# Patient Record
Sex: MALE | Race: WHITE | NOT HISPANIC OR LATINO | Employment: OTHER | URBAN - METROPOLITAN AREA
[De-identification: names, ages, dates, MRNs, and addresses within clinical notes are randomized per-mention and may not be internally consistent; named-entity substitution may affect disease eponyms.]

---

## 2023-02-24 LAB
THYROPEROXIDASE AB (IU/ML) IN SER/PLAS: <28 IU/ML
THYROTROPIN (MIU/L) IN SER/PLAS BY DETECTION LIMIT <= 0.05 MIU/L: 0.94 MIU/L (ref 0.44–3.98)
THYROXINE (T4) (UG/DL) IN SER/PLAS: 6.1 UG/DL (ref 4.5–11.1)

## 2023-03-04 LAB
AMPHETAMINE (PRESENCE) IN URINE BY SCREEN METHOD: NORMAL
BARBITURATES PRESENCE IN URINE BY SCREEN METHOD: NORMAL
BENZODIAZEPINE (PRESENCE) IN URINE BY SCREEN METHOD: NORMAL
CANNABINOIDS IN URINE BY SCREEN METHOD: NORMAL
COCAINE (PRESENCE) IN URINE BY SCREEN METHOD: NORMAL
DRUG SCREEN COMMENT URINE: NORMAL
FENTANYL URINE: NORMAL
METHADONE (PRESENCE) IN URINE BY SCREEN METHOD: NORMAL
OPIATES (PRESENCE) IN URINE BY SCREEN METHOD: NORMAL
OXYCODONE (PRESENCE) IN URINE BY SCREEN METHOD: NORMAL
PHENCYCLIDINE (PRESENCE) IN URINE BY SCREEN METHOD: NORMAL

## 2023-03-10 DIAGNOSIS — R60.9 PERIPHERAL EDEMA: Primary | ICD-10-CM

## 2023-03-10 RX ORDER — METOPROLOL SUCCINATE 25 MG/1
0.5 TABLET, EXTENDED RELEASE ORAL DAILY
COMMUNITY
Start: 2023-01-16 | End: 2023-12-08 | Stop reason: SDUPTHER

## 2023-03-10 RX ORDER — ASPIRIN 81 MG/1
1 TABLET ORAL DAILY
COMMUNITY
Start: 2019-08-14 | End: 2023-05-03 | Stop reason: SDUPTHER

## 2023-03-10 RX ORDER — TORSEMIDE 10 MG/1
1 TABLET ORAL DAILY
COMMUNITY
Start: 2023-01-24 | End: 2023-03-10 | Stop reason: SDUPTHER

## 2023-03-10 RX ORDER — ROSUVASTATIN CALCIUM 5 MG/1
5 TABLET, COATED ORAL DAILY
COMMUNITY
End: 2023-12-08 | Stop reason: SDUPTHER

## 2023-03-10 RX ORDER — LORAZEPAM 0.5 MG/1
0.5 TABLET ORAL DAILY PRN
COMMUNITY
End: 2023-06-19 | Stop reason: SDUPTHER

## 2023-03-10 RX ORDER — ATORVASTATIN CALCIUM 20 MG/1
20 TABLET, FILM COATED ORAL DAILY
COMMUNITY
End: 2023-05-03 | Stop reason: ALTCHOICE

## 2023-03-10 RX ORDER — TORSEMIDE 10 MG/1
10 TABLET ORAL DAILY
Qty: 90 TABLET | Refills: 1 | Status: SHIPPED | OUTPATIENT
Start: 2023-03-10 | End: 2023-05-03 | Stop reason: ALTCHOICE

## 2023-03-10 RX ORDER — LISINOPRIL 10 MG/1
1 TABLET ORAL
COMMUNITY
Start: 2016-01-08 | End: 2023-12-08 | Stop reason: SDUPTHER

## 2023-03-24 PROBLEM — R61 HYPERHIDROSIS: Status: ACTIVE | Noted: 2023-03-24

## 2023-03-24 PROBLEM — M15.9 GENERALIZED OSTEOARTHRITIS OF MULTIPLE SITES: Status: ACTIVE | Noted: 2023-03-24

## 2023-03-24 PROBLEM — M25.519 SHOULDER PAIN: Status: ACTIVE | Noted: 2023-03-24

## 2023-03-24 PROBLEM — F41.8 SITUATIONAL ANXIETY: Status: ACTIVE | Noted: 2023-03-24

## 2023-03-24 PROBLEM — L72.3 SEBACEOUS CYST: Status: ACTIVE | Noted: 2023-03-24

## 2023-03-24 PROBLEM — H00.012 HORDEOLUM EXTERNUM OF RIGHT LOWER EYELID: Status: ACTIVE | Noted: 2023-03-24

## 2023-03-24 PROBLEM — S86.912A MUSCLE STRAIN OF LEFT LOWER LEG: Status: ACTIVE | Noted: 2023-03-24

## 2023-03-24 PROBLEM — R26.81 GAIT INSTABILITY: Status: ACTIVE | Noted: 2023-03-24

## 2023-03-24 PROBLEM — I35.0 MODERATE AORTIC STENOSIS: Status: ACTIVE | Noted: 2023-03-24

## 2023-03-24 PROBLEM — B00.9 HERPES SIMPLEX: Status: ACTIVE | Noted: 2023-03-24

## 2023-03-24 PROBLEM — R29.898 WEAKNESS OF BOTH LOWER EXTREMITIES: Status: ACTIVE | Noted: 2023-03-24

## 2023-03-24 PROBLEM — R00.1 BRADYCARDIA, SINUS: Status: ACTIVE | Noted: 2023-03-24

## 2023-03-24 PROBLEM — H93.19 TINNITUS: Status: ACTIVE | Noted: 2023-03-24

## 2023-03-24 PROBLEM — E78.2 HYPERLIPIDEMIA, MIXED: Status: ACTIVE | Noted: 2023-03-24

## 2023-03-24 PROBLEM — J06.9 URI, ACUTE: Status: ACTIVE | Noted: 2023-03-24

## 2023-03-24 PROBLEM — K41.90 FEMORAL HERNIA: Status: ACTIVE | Noted: 2023-03-24

## 2023-03-24 PROBLEM — L30.1 DYSHIDROSIS: Status: ACTIVE | Noted: 2023-03-24

## 2023-03-24 PROBLEM — Z95.5 PRESENCE OF STENT IN CORONARY ARTERY: Status: ACTIVE | Noted: 2023-03-24

## 2023-03-24 PROBLEM — I25.10 CORONARY ARTERY DISEASE: Status: ACTIVE | Noted: 2023-03-24

## 2023-03-24 PROBLEM — I10 ESSENTIAL HYPERTENSION: Status: ACTIVE | Noted: 2023-03-24

## 2023-03-24 PROBLEM — M26.649 TMJ ARTHRITIS: Status: ACTIVE | Noted: 2023-03-24

## 2023-03-24 RX ORDER — ASPIRIN 81 MG/1
1 TABLET ORAL DAILY
COMMUNITY

## 2023-04-12 ENCOUNTER — TELEPHONE (OUTPATIENT)
Dept: PRIMARY CARE | Facility: CLINIC | Age: 88
End: 2023-04-12
Payer: MEDICARE

## 2023-04-19 ENCOUNTER — OFFICE VISIT (OUTPATIENT)
Dept: PRIMARY CARE | Facility: CLINIC | Age: 88
End: 2023-04-19
Payer: MEDICARE

## 2023-04-19 VITALS
DIASTOLIC BLOOD PRESSURE: 68 MMHG | HEIGHT: 66 IN | WEIGHT: 126.6 LBS | SYSTOLIC BLOOD PRESSURE: 122 MMHG | BODY MASS INDEX: 20.34 KG/M2 | TEMPERATURE: 97.4 F | OXYGEN SATURATION: 98 % | HEART RATE: 68 BPM

## 2023-04-19 DIAGNOSIS — S61.412D LACERATION OF LEFT HAND WITHOUT FOREIGN BODY, SUBSEQUENT ENCOUNTER: Primary | ICD-10-CM

## 2023-04-19 PROBLEM — R00.2 PALPITATIONS: Status: ACTIVE | Noted: 2023-04-19

## 2023-04-19 PROBLEM — S01.01XS SCALP LACERATION, SEQUELA: Status: ACTIVE | Noted: 2023-04-19

## 2023-04-19 PROBLEM — I48.91 NEW ONSET ATRIAL FIBRILLATION (MULTI): Status: ACTIVE | Noted: 2023-04-19

## 2023-04-19 PROBLEM — E04.2 MULTINODULAR THYROID: Status: ACTIVE | Noted: 2023-04-19

## 2023-04-19 PROBLEM — H91.93 HEARING LOSS, BILATERAL: Status: ACTIVE | Noted: 2023-04-19

## 2023-04-19 PROBLEM — I50.42: Status: ACTIVE | Noted: 2023-04-19

## 2023-04-19 PROBLEM — I50.9 CHF (CONGESTIVE HEART FAILURE) (MULTI): Status: ACTIVE | Noted: 2023-04-19

## 2023-04-19 PROBLEM — T07.XXXA MULTIPLE CONTUSIONS: Status: ACTIVE | Noted: 2023-04-19

## 2023-04-19 PROBLEM — E01.0 THYROMEGALY: Status: ACTIVE | Noted: 2023-04-19

## 2023-04-19 PROCEDURE — 3078F DIAST BP <80 MM HG: CPT | Performed by: FAMILY MEDICINE

## 2023-04-19 PROCEDURE — 3074F SYST BP LT 130 MM HG: CPT | Performed by: FAMILY MEDICINE

## 2023-04-19 PROCEDURE — 99214 OFFICE O/P EST MOD 30 MIN: CPT | Performed by: FAMILY MEDICINE

## 2023-04-19 PROCEDURE — 1036F TOBACCO NON-USER: CPT | Performed by: FAMILY MEDICINE

## 2023-04-19 PROCEDURE — 1157F ADVNC CARE PLAN IN RCRD: CPT | Performed by: FAMILY MEDICINE

## 2023-04-19 PROCEDURE — 1159F MED LIST DOCD IN RCRD: CPT | Performed by: FAMILY MEDICINE

## 2023-04-19 PROCEDURE — 1160F RVW MEDS BY RX/DR IN RCRD: CPT | Performed by: FAMILY MEDICINE

## 2023-04-19 RX ORDER — FUROSEMIDE 20 MG/1
1 TABLET ORAL 2 TIMES DAILY
COMMUNITY
Start: 2023-03-24 | End: 2023-10-26 | Stop reason: ALTCHOICE

## 2023-04-19 RX ORDER — DOXYCYCLINE HYCLATE 100 MG
TABLET ORAL
COMMUNITY
Start: 2023-04-12 | End: 2023-05-03 | Stop reason: WASHOUT

## 2023-04-19 SDOH — ECONOMIC STABILITY: FOOD INSECURITY: WITHIN THE PAST 12 MONTHS, THE FOOD YOU BOUGHT JUST DIDN'T LAST AND YOU DIDN'T HAVE MONEY TO GET MORE.: NEVER TRUE

## 2023-04-19 SDOH — ECONOMIC STABILITY: TRANSPORTATION INSECURITY
IN THE PAST 12 MONTHS, HAS THE LACK OF TRANSPORTATION KEPT YOU FROM MEDICAL APPOINTMENTS OR FROM GETTING MEDICATIONS?: NO

## 2023-04-19 SDOH — ECONOMIC STABILITY: FOOD INSECURITY: WITHIN THE PAST 12 MONTHS, YOU WORRIED THAT YOUR FOOD WOULD RUN OUT BEFORE YOU GOT MONEY TO BUY MORE.: NEVER TRUE

## 2023-04-19 SDOH — ECONOMIC STABILITY: HOUSING INSECURITY
IN THE LAST 12 MONTHS, WAS THERE A TIME WHEN YOU DID NOT HAVE A STEADY PLACE TO SLEEP OR SLEPT IN A SHELTER (INCLUDING NOW)?: NO

## 2023-04-19 SDOH — ECONOMIC STABILITY: TRANSPORTATION INSECURITY
IN THE PAST 12 MONTHS, HAS LACK OF TRANSPORTATION KEPT YOU FROM MEETINGS, WORK, OR FROM GETTING THINGS NEEDED FOR DAILY LIVING?: NO

## 2023-04-19 SDOH — ECONOMIC STABILITY: INCOME INSECURITY: IN THE LAST 12 MONTHS, WAS THERE A TIME WHEN YOU WERE NOT ABLE TO PAY THE MORTGAGE OR RENT ON TIME?: NO

## 2023-04-19 ASSESSMENT — SOCIAL DETERMINANTS OF HEALTH (SDOH)
WITHIN THE LAST YEAR, HAVE YOU BEEN AFRAID OF YOUR PARTNER OR EX-PARTNER?: NO
WITHIN THE LAST YEAR, HAVE TO BEEN RAPED OR FORCED TO HAVE ANY KIND OF SEXUAL ACTIVITY BY YOUR PARTNER OR EX-PARTNER?: NO
WITHIN THE LAST YEAR, HAVE YOU BEEN KICKED, HIT, SLAPPED, OR OTHERWISE PHYSICALLY HURT BY YOUR PARTNER OR EX-PARTNER?: NO
HOW HARD IS IT FOR YOU TO PAY FOR THE VERY BASICS LIKE FOOD, HOUSING, MEDICAL CARE, AND HEATING?: NOT HARD AT ALL
WITHIN THE LAST YEAR, HAVE YOU BEEN HUMILIATED OR EMOTIONALLY ABUSED IN OTHER WAYS BY YOUR PARTNER OR EX-PARTNER?: NO

## 2023-04-19 ASSESSMENT — LIFESTYLE VARIABLES
HOW OFTEN DO YOU HAVE SIX OR MORE DRINKS ON ONE OCCASION: NEVER
HOW OFTEN DO YOU HAVE A DRINK CONTAINING ALCOHOL: NEVER
HOW MANY STANDARD DRINKS CONTAINING ALCOHOL DO YOU HAVE ON A TYPICAL DAY: PATIENT DOES NOT DRINK
AUDIT-C TOTAL SCORE: 0
SKIP TO QUESTIONS 9-10: 1

## 2023-04-19 ASSESSMENT — PATIENT HEALTH QUESTIONNAIRE - PHQ9
2. FEELING DOWN, DEPRESSED OR HOPELESS: NOT AT ALL
1. LITTLE INTEREST OR PLEASURE IN DOING THINGS: NOT AT ALL
SUM OF ALL RESPONSES TO PHQ9 QUESTIONS 1 & 2: 0

## 2023-04-19 ASSESSMENT — PAIN SCALES - GENERAL: PAINLEVEL: 0-NO PAIN

## 2023-04-19 NOTE — PROGRESS NOTES
"Subjective   Patient ID: Sravan Christiansen is a 89 y.o. male who presents for misc (S/r removal left hand).    HPI     Review of Systems   Cardiovascular:         He is going for cardiac catheterization tomorrow morning   Skin:         Healing laceration left hand dorsal       Objective   /68 (BP Location: Left arm)   Pulse 68   Temp 36.3 °C (97.4 °F)   Ht 1.676 m (5' 6\")   Wt 57.4 kg (126 lb 9.6 oz)   SpO2 98%   BMI 20.43 kg/m²     Physical Exam  Vitals and nursing note reviewed.   Skin:     Comments: Healing laceration left hand no evidence of any cellulitis.  Sutures are intact we are removing the sutures today.         Assessment/Plan   Problem List Items Addressed This Visit    None  Visit Diagnoses       Laceration of left hand without foreign body, subsequent encounter    -  Primary               "

## 2023-04-20 ENCOUNTER — HOSPITAL ENCOUNTER (OUTPATIENT)
Dept: DATA CONVERSION | Facility: HOSPITAL | Age: 88
End: 2023-04-20
Attending: STUDENT IN AN ORGANIZED HEALTH CARE EDUCATION/TRAINING PROGRAM | Admitting: STUDENT IN AN ORGANIZED HEALTH CARE EDUCATION/TRAINING PROGRAM
Payer: MEDICARE

## 2023-04-20 DIAGNOSIS — I10 ESSENTIAL (PRIMARY) HYPERTENSION: ICD-10-CM

## 2023-04-20 DIAGNOSIS — I48.91 UNSPECIFIED ATRIAL FIBRILLATION (MULTI): ICD-10-CM

## 2023-04-20 DIAGNOSIS — I25.10 ATHEROSCLEROTIC HEART DISEASE OF NATIVE CORONARY ARTERY WITHOUT ANGINA PECTORIS: ICD-10-CM

## 2023-04-20 DIAGNOSIS — Z95.5 PRESENCE OF CORONARY ANGIOPLASTY IMPLANT AND GRAFT: ICD-10-CM

## 2023-04-20 DIAGNOSIS — R06.02 SHORTNESS OF BREATH: ICD-10-CM

## 2023-04-20 DIAGNOSIS — I42.9 CARDIOMYOPATHY, UNSPECIFIED (MULTI): ICD-10-CM

## 2023-04-20 DIAGNOSIS — E78.5 HYPERLIPIDEMIA, UNSPECIFIED: ICD-10-CM

## 2023-04-20 DIAGNOSIS — R00.2 PALPITATIONS: ICD-10-CM

## 2023-04-20 LAB
ANION GAP IN SER/PLAS: 13 MMOL/L (ref 10–20)
CALCIUM (MG/DL) IN SER/PLAS: 9.6 MG/DL (ref 8.6–10.3)
CARBON DIOXIDE, TOTAL (MMOL/L) IN SER/PLAS: 26 MMOL/L (ref 21–32)
CHLORIDE (MMOL/L) IN SER/PLAS: 105 MMOL/L (ref 98–107)
CREATININE (MG/DL) IN SER/PLAS: 1.43 MG/DL (ref 0.5–1.3)
ERYTHROCYTE DISTRIBUTION WIDTH (RATIO) BY AUTOMATED COUNT: 14.1 % (ref 11.5–14.5)
ERYTHROCYTE MEAN CORPUSCULAR HEMOGLOBIN CONCENTRATION (G/DL) BY AUTOMATED: 32.2 G/DL (ref 32–36)
ERYTHROCYTE MEAN CORPUSCULAR VOLUME (FL) BY AUTOMATED COUNT: 97 FL (ref 80–100)
ERYTHROCYTES (10*6/UL) IN BLOOD BY AUTOMATED COUNT: 4.04 X10E12/L (ref 4.5–5.9)
FIO2: 21 %
FIO2: 21 % (ref 21–100)
FIO2: 21 % (ref 21–100)
GFR MALE: 46 ML/MIN/1.73M2
GLUCOSE (MG/DL) IN SER/PLAS: 93 MG/DL (ref 74–99)
HEMATOCRIT (%) IN BLOOD BY AUTOMATED COUNT: 39.1 % (ref 41–52)
HEMOGLOBIN (G/DL) IN BLOOD: 12.6 G/DL (ref 13.5–17.5)
INR IN PPP BY COAGULATION ASSAY: 1.1 (ref 0.9–1.1)
LEUKOCYTES (10*3/UL) IN BLOOD BY AUTOMATED COUNT: 8.9 X10E9/L (ref 4.4–11.3)
NRBC (PER 100 WBCS) BY AUTOMATED COUNT: 0 /100 WBC (ref 0–0)
PATIENT TEMPERATURE: 37 DEGREES
PATIENT TEMPERATURE: 37 DEGREES C
PATIENT TEMPERATURE: 37 DEGREES C
PLATELETS (10*3/UL) IN BLOOD AUTOMATED COUNT: 159 X10E9/L (ref 150–450)
POCT BASE EXCESS, ARTERIAL: -0.8 MMOL/L (ref -2–3)
POCT BASE EXCESS, MIXED: 2 MMOL/L
POCT BASE EXCESS, VENOUS: 0.8 MMOL/L (ref -2–3)
POCT HCO3, ARTERIAL: 24.3 MMOL/L (ref 22–26)
POCT HCO3, MIXED: 27.8 MMOL/L
POCT HCO3, VENOUS: 26.6 MMOL/L (ref 22–26)
POCT OXY HEMOGLOBIN, ARTERIAL: 94 % (ref 94–98)
POCT OXY HEMOGLOBIN, MIXED: 66.1 % (ref 45–75)
POCT OXY HEMOGLOBIN, VENOUS: 64 % (ref 45–75)
POCT PCO2, ARTERIAL: 41 MMHG (ref 38–42)
POCT PCO2, MIXED: 47 MMHG
POCT PCO2, VENOUS: 46 MMHG (ref 41–51)
POCT PH, ARTERIAL: 7.38 (ref 7.38–7.42)
POCT PH, MIXED: 7.38
POCT PH, VENOUS: 7.37 (ref 7.33–7.43)
POCT PO2, ARTERIAL: 78 MMHG (ref 85–95)
POCT PO2, MIXED: 38 MMHG
POCT PO2, VENOUS: 37 MMHG (ref 35–45)
POCT SO2, ARTERIAL: 96 % (ref 94–100)
POCT SO2, MIXED: 68 %
POCT SO2, VENOUS: 65 % (ref 45–75)
POTASSIUM (MMOL/L) IN SER/PLAS: 4.2 MMOL/L (ref 3.5–5.3)
PROTHROMBIN TIME (PT) IN PPP BY COAGULATION ASSAY: 13 SEC (ref 9.8–13.4)
SITE OF ARTERIAL PUNCTURE: ABNORMAL
SODIUM (MMOL/L) IN SER/PLAS: 140 MMOL/L (ref 136–145)
UREA NITROGEN (MG/DL) IN SER/PLAS: 29 MG/DL (ref 6–23)

## 2023-04-21 LAB
FIO2: 21 % (ref 21–100)
PATIENT TEMPERATURE: 37 DEGREES C
POCT BASE EXCESS, VENOUS: 0.1 MMOL/L (ref -2–3)
POCT HCO3, VENOUS: 26 MMOL/L (ref 22–26)
POCT OXY HEMOGLOBIN, VENOUS: 63.5 % (ref 45–75)
POCT PCO2, VENOUS: 46 MMHG (ref 41–51)
POCT PH, VENOUS: 7.36 (ref 7.33–7.43)
POCT PO2, VENOUS: 39 MMHG (ref 35–45)
POCT SO2, VENOUS: 65 % (ref 45–75)

## 2023-04-22 LAB
ATRIAL RATE: 77 BPM
P AXIS: 96 DEGREES
P OFFSET: 164 MS
P ONSET: 113 MS
PR INTERVAL: 200 MS
Q ONSET: 213 MS
QRS COUNT: 12 BEATS
QRS DURATION: 110 MS
QT INTERVAL: 386 MS
QTC CALCULATION(BAZETT): 436 MS
QTC FREDERICIA: 419 MS
R AXIS: -15 DEGREES
T AXIS: 77 DEGREES
T OFFSET: 406 MS
VENTRICULAR RATE: 77 BPM

## 2023-05-03 ENCOUNTER — OFFICE VISIT (OUTPATIENT)
Dept: PRIMARY CARE | Facility: CLINIC | Age: 88
End: 2023-05-03
Payer: MEDICARE

## 2023-05-03 VITALS
WEIGHT: 129.4 LBS | HEIGHT: 66 IN | DIASTOLIC BLOOD PRESSURE: 70 MMHG | HEART RATE: 69 BPM | SYSTOLIC BLOOD PRESSURE: 120 MMHG | TEMPERATURE: 97.9 F | BODY MASS INDEX: 20.79 KG/M2 | OXYGEN SATURATION: 97 %

## 2023-05-03 DIAGNOSIS — I10 ESSENTIAL HYPERTENSION: ICD-10-CM

## 2023-05-03 DIAGNOSIS — F41.8 SITUATIONAL ANXIETY: ICD-10-CM

## 2023-05-03 DIAGNOSIS — I35.0 MODERATE AORTIC STENOSIS: ICD-10-CM

## 2023-05-03 DIAGNOSIS — Z00.00 MEDICARE ANNUAL WELLNESS VISIT, SUBSEQUENT: Primary | ICD-10-CM

## 2023-05-03 DIAGNOSIS — Z95.5 PRESENCE OF STENT IN CORONARY ARTERY: ICD-10-CM

## 2023-05-03 PROBLEM — T82.898A: Status: ACTIVE | Noted: 2023-05-03

## 2023-05-03 PROCEDURE — 1159F MED LIST DOCD IN RCRD: CPT | Performed by: FAMILY MEDICINE

## 2023-05-03 PROCEDURE — 3074F SYST BP LT 130 MM HG: CPT | Performed by: FAMILY MEDICINE

## 2023-05-03 PROCEDURE — 1036F TOBACCO NON-USER: CPT | Performed by: FAMILY MEDICINE

## 2023-05-03 PROCEDURE — 99497 ADVNCD CARE PLAN 30 MIN: CPT | Performed by: FAMILY MEDICINE

## 2023-05-03 PROCEDURE — G0439 PPPS, SUBSEQ VISIT: HCPCS | Performed by: FAMILY MEDICINE

## 2023-05-03 PROCEDURE — 1160F RVW MEDS BY RX/DR IN RCRD: CPT | Performed by: FAMILY MEDICINE

## 2023-05-03 PROCEDURE — 3078F DIAST BP <80 MM HG: CPT | Performed by: FAMILY MEDICINE

## 2023-05-03 PROCEDURE — 1157F ADVNC CARE PLAN IN RCRD: CPT | Performed by: FAMILY MEDICINE

## 2023-05-03 PROCEDURE — 99397 PER PM REEVAL EST PAT 65+ YR: CPT | Performed by: FAMILY MEDICINE

## 2023-05-03 RX ORDER — ERYTHROMYCIN 5 MG/G
1 OINTMENT OPHTHALMIC 4 TIMES DAILY
COMMUNITY
Start: 2019-01-16 | End: 2023-10-26 | Stop reason: ALTCHOICE

## 2023-05-03 RX ORDER — TAMSULOSIN HYDROCHLORIDE 0.4 MG/1
CAPSULE ORAL
COMMUNITY
End: 2023-05-03

## 2023-05-03 RX ORDER — MINERAL OIL
ENEMA (ML) RECTAL
COMMUNITY
Start: 2022-02-09 | End: 2023-12-08 | Stop reason: WASHOUT

## 2023-05-03 RX ORDER — HYDROCORTISONE 25 MG/G
CREAM TOPICAL
COMMUNITY
End: 2023-12-08 | Stop reason: WASHOUT

## 2023-05-03 RX ORDER — METOPROLOL TARTRATE 25 MG/1
TABLET, FILM COATED ORAL
COMMUNITY
Start: 2016-06-27 | End: 2023-05-03 | Stop reason: SDUPTHER

## 2023-05-03 RX ORDER — CLOPIDOGREL BISULFATE 75 MG/1
TABLET ORAL
COMMUNITY
End: 2023-05-03 | Stop reason: ALTCHOICE

## 2023-05-03 RX ORDER — DICLOFENAC SODIUM 25 MG/1
25 TABLET, DELAYED RELEASE ORAL 2 TIMES DAILY
COMMUNITY
Start: 2021-02-17 | End: 2023-05-03

## 2023-05-03 ASSESSMENT — ENCOUNTER SYMPTOMS
NEUROLOGICAL NEGATIVE: 1
RESPIRATORY NEGATIVE: 1
CONSTITUTIONAL NEGATIVE: 1
LOSS OF SENSATION IN FEET: 0
ENDOCRINE NEGATIVE: 1
PSYCHIATRIC NEGATIVE: 1
DEPRESSION: 0
ARTHRALGIAS: 1
OCCASIONAL FEELINGS OF UNSTEADINESS: 0

## 2023-05-03 ASSESSMENT — PAIN SCALES - GENERAL: PAINLEVEL: 0-NO PAIN

## 2023-05-03 NOTE — PROGRESS NOTES
"Subjective   Patient ID: Sravan Christiansen is a 89 y.o. male who presents for Annual Exam (Annual medicare wellness not fasting).    HPI     Review of Systems   Constitutional: Negative.    HENT:  Positive for hearing loss.         Bilateral hearing aids   Respiratory: Negative.     Cardiovascular:         Being worked up for a aortic valve replacement Dr Cardozo   Endocrine: Negative.    Genitourinary: Negative.    Musculoskeletal:  Positive for arthralgias.   Neurological: Negative.    Psychiatric/Behavioral: Negative.         Objective   /70 (BP Location: Left arm)   Pulse 69   Temp 36.6 °C (97.9 °F) (Temporal)   Ht 1.676 m (5' 6\")   Wt 58.7 kg (129 lb 6.4 oz)   SpO2 97%   BMI 20.89 kg/m²     Physical Exam  Vitals and nursing note reviewed.   HENT:      Right Ear: Tympanic membrane normal.      Left Ear: Tympanic membrane normal. There is no impacted cerumen.      Mouth/Throat:      Mouth: Mucous membranes are moist.   Eyes:      Pupils: Pupils are equal, round, and reactive to light.   Cardiovascular:      Rate and Rhythm: Normal rate and regular rhythm.      Pulses: Normal pulses.      Heart sounds: Murmur heard.      Systolic murmur is present with a grade of 3/6.   Pulmonary:      Breath sounds: Normal breath sounds.   Abdominal:      Palpations: Abdomen is soft.   Musculoskeletal:         General: Normal range of motion.   Neurological:      General: No focal deficit present.      Mental Status: He is alert and oriented to person, place, and time.   Psychiatric:         Mood and Affect: Mood normal.         Behavior: Behavior normal.         Thought Content: Thought content normal.         Judgment: Judgment normal.         Assessment/Plan   Problem List Items Addressed This Visit          Circulatory    Essential hypertension    Relevant Orders    Lipid Panel    CBC and Auto Differential    Comprehensive Metabolic Panel    Urinalysis Microscopic Only    Moderate aortic stenosis    Presence of " stent in coronary artery       Other    Situational anxiety     Other Visit Diagnoses       Medicare annual wellness visit, subsequent    -  Primary

## 2023-05-04 ENCOUNTER — LAB (OUTPATIENT)
Dept: LAB | Facility: LAB | Age: 88
End: 2023-05-04
Payer: MEDICARE

## 2023-05-04 DIAGNOSIS — I10 ESSENTIAL HYPERTENSION: ICD-10-CM

## 2023-05-04 LAB
ALANINE AMINOTRANSFERASE (SGPT) (U/L) IN SER/PLAS: 21 U/L (ref 10–52)
ALBUMIN (G/DL) IN SER/PLAS: 4.4 G/DL (ref 3.4–5)
ALKALINE PHOSPHATASE (U/L) IN SER/PLAS: 71 U/L (ref 33–136)
ANION GAP IN SER/PLAS: 12 MMOL/L (ref 10–20)
ASPARTATE AMINOTRANSFERASE (SGOT) (U/L) IN SER/PLAS: 27 U/L (ref 9–39)
BACTERIA, URINE: ABNORMAL /HPF
BASOPHILS (10*3/UL) IN BLOOD BY AUTOMATED COUNT: 0.07 X10E9/L (ref 0–0.1)
BASOPHILS/100 LEUKOCYTES IN BLOOD BY AUTOMATED COUNT: 1 % (ref 0–2)
BILIRUBIN TOTAL (MG/DL) IN SER/PLAS: 1.2 MG/DL (ref 0–1.2)
CALCIUM (MG/DL) IN SER/PLAS: 9.7 MG/DL (ref 8.6–10.6)
CARBON DIOXIDE, TOTAL (MMOL/L) IN SER/PLAS: 29 MMOL/L (ref 21–32)
CHLORIDE (MMOL/L) IN SER/PLAS: 105 MMOL/L (ref 98–107)
CHOLESTEROL (MG/DL) IN SER/PLAS: 133 MG/DL (ref 0–199)
CHOLESTEROL IN HDL (MG/DL) IN SER/PLAS: 68.7 MG/DL
CHOLESTEROL/HDL RATIO: 1.9
CREATININE (MG/DL) IN SER/PLAS: 1.49 MG/DL (ref 0.5–1.3)
EOSINOPHILS (10*3/UL) IN BLOOD BY AUTOMATED COUNT: 0.2 X10E9/L (ref 0–0.4)
EOSINOPHILS/100 LEUKOCYTES IN BLOOD BY AUTOMATED COUNT: 2.8 % (ref 0–6)
ERYTHROCYTE DISTRIBUTION WIDTH (RATIO) BY AUTOMATED COUNT: 14 % (ref 11.5–14.5)
ERYTHROCYTE MEAN CORPUSCULAR HEMOGLOBIN CONCENTRATION (G/DL) BY AUTOMATED: 31.4 G/DL (ref 32–36)
ERYTHROCYTE MEAN CORPUSCULAR VOLUME (FL) BY AUTOMATED COUNT: 98 FL (ref 80–100)
ERYTHROCYTES (10*6/UL) IN BLOOD BY AUTOMATED COUNT: 3.93 X10E12/L (ref 4.5–5.9)
GFR MALE: 43 ML/MIN/1.73M2
GLUCOSE (MG/DL) IN SER/PLAS: 91 MG/DL (ref 74–99)
HEMATOCRIT (%) IN BLOOD BY AUTOMATED COUNT: 38.5 % (ref 41–52)
HEMOGLOBIN (G/DL) IN BLOOD: 12.1 G/DL (ref 13.5–17.5)
IMMATURE GRANULOCYTES/100 LEUKOCYTES IN BLOOD BY AUTOMATED COUNT: 0.1 % (ref 0–0.9)
LDL: 46 MG/DL (ref 0–99)
LEUKOCYTES (10*3/UL) IN BLOOD BY AUTOMATED COUNT: 7 X10E9/L (ref 4.4–11.3)
LYMPHOCYTES (10*3/UL) IN BLOOD BY AUTOMATED COUNT: 1.98 X10E9/L (ref 0.8–3)
LYMPHOCYTES/100 LEUKOCYTES IN BLOOD BY AUTOMATED COUNT: 28.2 % (ref 13–44)
MONOCYTES (10*3/UL) IN BLOOD BY AUTOMATED COUNT: 0.67 X10E9/L (ref 0.05–0.8)
MONOCYTES/100 LEUKOCYTES IN BLOOD BY AUTOMATED COUNT: 9.5 % (ref 2–10)
NEUTROPHILS (10*3/UL) IN BLOOD BY AUTOMATED COUNT: 4.1 X10E9/L (ref 1.6–5.5)
NEUTROPHILS/100 LEUKOCYTES IN BLOOD BY AUTOMATED COUNT: 58.4 % (ref 40–80)
NRBC (PER 100 WBCS) BY AUTOMATED COUNT: 0 /100 WBC (ref 0–0)
PLATELETS (10*3/UL) IN BLOOD AUTOMATED COUNT: 183 X10E9/L (ref 150–450)
POTASSIUM (MMOL/L) IN SER/PLAS: 4.2 MMOL/L (ref 3.5–5.3)
PROTEIN TOTAL: 7 G/DL (ref 6.4–8.2)
RBC, URINE: ABNORMAL /HPF (ref 0–5)
SODIUM (MMOL/L) IN SER/PLAS: 142 MMOL/L (ref 136–145)
TRIGLYCERIDE (MG/DL) IN SER/PLAS: 93 MG/DL (ref 0–149)
UREA NITROGEN (MG/DL) IN SER/PLAS: 30 MG/DL (ref 6–23)
VLDL: 19 MG/DL (ref 0–40)
WBC, URINE: ABNORMAL /HPF (ref 0–5)

## 2023-05-04 PROCEDURE — 80053 COMPREHEN METABOLIC PANEL: CPT

## 2023-05-04 PROCEDURE — 85025 COMPLETE CBC W/AUTO DIFF WBC: CPT

## 2023-05-04 PROCEDURE — 81001 URINALYSIS AUTO W/SCOPE: CPT

## 2023-05-04 PROCEDURE — 80061 LIPID PANEL: CPT

## 2023-05-04 PROCEDURE — 36415 COLL VENOUS BLD VENIPUNCTURE: CPT

## 2023-05-31 ENCOUNTER — TELEPHONE (OUTPATIENT)
Dept: PRIMARY CARE | Facility: CLINIC | Age: 88
End: 2023-05-31
Payer: MEDICARE

## 2023-06-09 ENCOUNTER — TELEPHONE (OUTPATIENT)
Dept: PRIMARY CARE | Facility: CLINIC | Age: 88
End: 2023-06-09
Payer: MEDICARE

## 2023-06-19 DIAGNOSIS — F41.8 SITUATIONAL ANXIETY: Primary | ICD-10-CM

## 2023-06-19 RX ORDER — LORAZEPAM 0.5 MG/1
0.5 TABLET ORAL DAILY PRN
Qty: 30 TABLET | Refills: 0 | Status: SHIPPED | OUTPATIENT
Start: 2023-06-19 | End: 2023-06-26 | Stop reason: SDUPTHER

## 2023-06-20 LAB
ANION GAP IN SER/PLAS: 11 MMOL/L (ref 10–20)
CALCIUM (MG/DL) IN SER/PLAS: 9.2 MG/DL (ref 8.6–10.6)
CARBON DIOXIDE, TOTAL (MMOL/L) IN SER/PLAS: 30 MMOL/L (ref 21–32)
CHLORIDE (MMOL/L) IN SER/PLAS: 105 MMOL/L (ref 98–107)
CREATININE (MG/DL) IN SER/PLAS: 1.62 MG/DL (ref 0.5–1.3)
ERYTHROCYTE DISTRIBUTION WIDTH (RATIO) BY AUTOMATED COUNT: 13.8 % (ref 11.5–14.5)
ERYTHROCYTE MEAN CORPUSCULAR HEMOGLOBIN CONCENTRATION (G/DL) BY AUTOMATED: 31.6 G/DL (ref 32–36)
ERYTHROCYTE MEAN CORPUSCULAR VOLUME (FL) BY AUTOMATED COUNT: 98 FL (ref 80–100)
ERYTHROCYTES (10*6/UL) IN BLOOD BY AUTOMATED COUNT: 3.8 X10E12/L (ref 4.5–5.9)
GFR MALE: 39 ML/MIN/1.73M2
GLUCOSE (MG/DL) IN SER/PLAS: 57 MG/DL (ref 74–99)
HEMATOCRIT (%) IN BLOOD BY AUTOMATED COUNT: 37.4 % (ref 41–52)
HEMOGLOBIN (G/DL) IN BLOOD: 11.8 G/DL (ref 13.5–17.5)
INR IN PPP BY COAGULATION ASSAY: 1.1 (ref 0.9–1.1)
LEUKOCYTES (10*3/UL) IN BLOOD BY AUTOMATED COUNT: 6.9 X10E9/L (ref 4.4–11.3)
NRBC (PER 100 WBCS) BY AUTOMATED COUNT: 0 /100 WBC (ref 0–0)
PLATELETS (10*3/UL) IN BLOOD AUTOMATED COUNT: 112 X10E9/L (ref 150–450)
POTASSIUM (MMOL/L) IN SER/PLAS: 3.7 MMOL/L (ref 3.5–5.3)
PROTHROMBIN TIME (PT) IN PPP BY COAGULATION ASSAY: 12.9 SEC (ref 9.8–12.8)
SODIUM (MMOL/L) IN SER/PLAS: 142 MMOL/L (ref 136–145)
UREA NITROGEN (MG/DL) IN SER/PLAS: 35 MG/DL (ref 6–23)

## 2023-06-21 PROBLEM — J18.9 MULTIFOCAL PNEUMONIA: Status: ACTIVE | Noted: 2023-06-21

## 2023-06-21 PROBLEM — R09.02 HYPOXIA: Status: ACTIVE | Noted: 2023-06-21

## 2023-06-26 ENCOUNTER — OFFICE VISIT (OUTPATIENT)
Dept: PRIMARY CARE | Facility: CLINIC | Age: 88
End: 2023-06-26
Payer: MEDICARE

## 2023-06-26 VITALS
TEMPERATURE: 97.7 F | SYSTOLIC BLOOD PRESSURE: 116 MMHG | HEART RATE: 87 BPM | DIASTOLIC BLOOD PRESSURE: 78 MMHG | BODY MASS INDEX: 20.39 KG/M2 | OXYGEN SATURATION: 97 % | WEIGHT: 122.4 LBS | HEIGHT: 65 IN

## 2023-06-26 DIAGNOSIS — F41.8 SITUATIONAL ANXIETY: ICD-10-CM

## 2023-06-26 DIAGNOSIS — I50.42 CHRONIC COMBINED SYSTOLIC AND DIASTOLIC HF (HEART FAILURE), NYHA CLASS 1 (MULTI): Primary | ICD-10-CM

## 2023-06-26 DIAGNOSIS — I10 ESSENTIAL HYPERTENSION: ICD-10-CM

## 2023-06-26 PROCEDURE — 1159F MED LIST DOCD IN RCRD: CPT | Performed by: FAMILY MEDICINE

## 2023-06-26 PROCEDURE — 3078F DIAST BP <80 MM HG: CPT | Performed by: FAMILY MEDICINE

## 2023-06-26 PROCEDURE — 3074F SYST BP LT 130 MM HG: CPT | Performed by: FAMILY MEDICINE

## 2023-06-26 PROCEDURE — 1157F ADVNC CARE PLAN IN RCRD: CPT | Performed by: FAMILY MEDICINE

## 2023-06-26 PROCEDURE — 99495 TRANSJ CARE MGMT MOD F2F 14D: CPT | Performed by: FAMILY MEDICINE

## 2023-06-26 PROCEDURE — 1036F TOBACCO NON-USER: CPT | Performed by: FAMILY MEDICINE

## 2023-06-26 PROCEDURE — 1160F RVW MEDS BY RX/DR IN RCRD: CPT | Performed by: FAMILY MEDICINE

## 2023-06-26 RX ORDER — LORAZEPAM 0.5 MG/1
0.5 TABLET ORAL DAILY PRN
Qty: 90 TABLET | Refills: 0 | Status: SHIPPED | OUTPATIENT
Start: 2023-07-26 | End: 2023-10-11 | Stop reason: SDUPTHER

## 2023-06-26 ASSESSMENT — PAIN SCALES - GENERAL: PAINLEVEL: 0-NO PAIN

## 2023-06-26 ASSESSMENT — ENCOUNTER SYMPTOMS
SLEEP DISTURBANCE: 1
NEUROLOGICAL NEGATIVE: 1
GASTROINTESTINAL NEGATIVE: 1
CONSTITUTIONAL NEGATIVE: 1
ENDOCRINE NEGATIVE: 1
ARTHRALGIAS: 1

## 2023-06-26 NOTE — PROGRESS NOTES
"Subjective   Patient ID: Sravan Christiansen is a 89 y.o. male who presents for consultation (Discuss med and surgery).    HPI     Review of Systems   Constitutional: Negative.    HENT: Negative.     Cardiovascular:         JAMES aortic valve 6/15 discharged 6/16    Gastrointestinal: Negative.    Endocrine: Negative.    Musculoskeletal:  Positive for arthralgias.   Neurological: Negative.    Psychiatric/Behavioral:  Positive for sleep disturbance.        Objective   /78 (BP Location: Left arm)   Pulse 87   Temp 36.5 °C (97.7 °F) (Temporal)   Ht 1.651 m (5' 5\")   Wt 55.5 kg (122 lb 6.4 oz)   SpO2 97%   BMI 20.37 kg/m²     Physical Exam  Vitals and nursing note reviewed.   Cardiovascular:      Rate and Rhythm: Normal rate and regular rhythm.      Pulses: Normal pulses.      Heart sounds: Normal heart sounds.   Pulmonary:      Breath sounds: Normal breath sounds.   Skin:     Comments: Healing cardiac catheterization wound right inguinal area no evidence of any cellulitis   Neurological:      General: No focal deficit present.      Mental Status: He is alert and oriented to person, place, and time.   Psychiatric:         Mood and Affect: Mood normal.         Behavior: Behavior normal.         Assessment/Plan   Problem List Items Addressed This Visit          Circulatory    Essential hypertension    Chronic combined systolic and diastolic HF (heart failure), NYHA class 1 (CMS/HCC) - Primary       Other    Situational anxiety    Relevant Medications    LORazepam (Ativan) 0.5 mg tablet (Start on 7/26/2023)          "

## 2023-09-14 NOTE — H&P
History & Physical Reviewed:   I have reviewed the History and Physical dated:  24-Mar-2023   History and Physical reviewed and relevant findings noted. Patient examined to review pertinent physical  findings.: No significant changes   Home Medications Reviewed: no changes noted   Allergies Reviewed: no changes noted       Airway/Sedation Assessment:  ·  Emotional Status calm   ·  Neurologic alert & oriented x 3   ·  Respiratory clear to auscultation   ·  Mouth Opening OK yes   ·  Neck Flexibility OK yes   ·  Oropharyngeal Classification Class II   ·  ASA PS Classification ASA III   ·  Sedation Plan moderate sedation       ERAS (Enhanced Recovery After Surgery):  ·  ERAS Patient: no     Consent:   COVID-19 Consent:  ·  COVID-19 Risk Consent Surgeon has reviewed key risks related to the risk of serenity COVID-19 and if they contract COVID-19 what the risks are.     Coronavirus Attestation of Need for Surgery:  ·  COVID-19 Surgery / Procedure Attestation: Select all criteria that apply Risk of rapidly worsening to severe symptoms if delayed       Electronic Signatures:  Viktor Lopez)  (Signed 20-Apr-2023 09:49)   Authored: History & Physical Reviewed, Airway/Sedation,  ERAS, Consent, Note Completion      Last Updated: 20-Apr-2023 09:49 by Viktor Lopez)

## 2023-10-04 DIAGNOSIS — I10 ESSENTIAL HYPERTENSION: ICD-10-CM

## 2023-10-04 DIAGNOSIS — I50.9 CONGESTIVE HEART FAILURE, UNSPECIFIED HF CHRONICITY, UNSPECIFIED HEART FAILURE TYPE (MULTI): ICD-10-CM

## 2023-10-11 ENCOUNTER — OFFICE VISIT (OUTPATIENT)
Dept: PRIMARY CARE | Facility: CLINIC | Age: 88
End: 2023-10-11
Payer: MEDICARE

## 2023-10-11 ENCOUNTER — LAB (OUTPATIENT)
Dept: LAB | Facility: LAB | Age: 88
End: 2023-10-11
Payer: MEDICARE

## 2023-10-11 VITALS
TEMPERATURE: 98 F | OXYGEN SATURATION: 98 % | BODY MASS INDEX: 20.62 KG/M2 | HEIGHT: 63 IN | SYSTOLIC BLOOD PRESSURE: 122 MMHG | DIASTOLIC BLOOD PRESSURE: 70 MMHG | HEART RATE: 72 BPM | WEIGHT: 116.4 LBS

## 2023-10-11 DIAGNOSIS — F41.8 SITUATIONAL ANXIETY: ICD-10-CM

## 2023-10-11 DIAGNOSIS — I10 ESSENTIAL HYPERTENSION: ICD-10-CM

## 2023-10-11 DIAGNOSIS — I50.9 CONGESTIVE HEART FAILURE, UNSPECIFIED HF CHRONICITY, UNSPECIFIED HEART FAILURE TYPE (MULTI): ICD-10-CM

## 2023-10-11 LAB
ANION GAP SERPL CALC-SCNC: 14 MMOL/L (ref 10–20)
BNP SERPL-MCNC: 1549 PG/ML (ref 0–99)
BUN SERPL-MCNC: 45 MG/DL (ref 6–23)
CALCIUM SERPL-MCNC: 9.7 MG/DL (ref 8.6–10.6)
CHLORIDE SERPL-SCNC: 103 MMOL/L (ref 98–107)
CO2 SERPL-SCNC: 31 MMOL/L (ref 21–32)
CREAT SERPL-MCNC: 1.43 MG/DL (ref 0.5–1.3)
GFR SERPL CREATININE-BSD FRML MDRD: 46 ML/MIN/1.73M*2
GLUCOSE SERPL-MCNC: 104 MG/DL (ref 74–99)
POTASSIUM SERPL-SCNC: 3.5 MMOL/L (ref 3.5–5.3)
SODIUM SERPL-SCNC: 144 MMOL/L (ref 136–145)

## 2023-10-11 PROCEDURE — 1159F MED LIST DOCD IN RCRD: CPT | Performed by: FAMILY MEDICINE

## 2023-10-11 PROCEDURE — G0008 ADMIN INFLUENZA VIRUS VAC: HCPCS | Performed by: FAMILY MEDICINE

## 2023-10-11 PROCEDURE — 80048 BASIC METABOLIC PNL TOTAL CA: CPT

## 2023-10-11 PROCEDURE — 90662 IIV NO PRSV INCREASED AG IM: CPT | Performed by: FAMILY MEDICINE

## 2023-10-11 PROCEDURE — 1036F TOBACCO NON-USER: CPT | Performed by: FAMILY MEDICINE

## 2023-10-11 PROCEDURE — 1160F RVW MEDS BY RX/DR IN RCRD: CPT | Performed by: FAMILY MEDICINE

## 2023-10-11 PROCEDURE — 36415 COLL VENOUS BLD VENIPUNCTURE: CPT

## 2023-10-11 PROCEDURE — 3078F DIAST BP <80 MM HG: CPT | Performed by: FAMILY MEDICINE

## 2023-10-11 PROCEDURE — 3074F SYST BP LT 130 MM HG: CPT | Performed by: FAMILY MEDICINE

## 2023-10-11 PROCEDURE — 83880 ASSAY OF NATRIURETIC PEPTIDE: CPT

## 2023-10-11 PROCEDURE — 99214 OFFICE O/P EST MOD 30 MIN: CPT | Performed by: FAMILY MEDICINE

## 2023-10-11 PROCEDURE — 1126F AMNT PAIN NOTED NONE PRSNT: CPT | Performed by: FAMILY MEDICINE

## 2023-10-11 RX ORDER — BENZONATATE 200 MG/1
CAPSULE ORAL
COMMUNITY
Start: 2023-09-29 | End: 2023-10-26 | Stop reason: ALTCHOICE

## 2023-10-11 RX ORDER — ATORVASTATIN CALCIUM 20 MG/1
TABLET, FILM COATED ORAL
COMMUNITY
End: 2023-10-26 | Stop reason: ALTCHOICE

## 2023-10-11 RX ORDER — SYRINGE-NEEDLE,INSULIN,0.5 ML 28GX1/2"
600 SYRINGE, EMPTY DISPOSABLE MISCELLANEOUS EVERY 12 HOURS
COMMUNITY
Start: 2023-09-29 | End: 2023-10-06

## 2023-10-11 RX ORDER — TAMSULOSIN HYDROCHLORIDE 0.4 MG/1
CAPSULE ORAL
COMMUNITY
End: 2023-12-08 | Stop reason: WASHOUT

## 2023-10-11 RX ORDER — TORSEMIDE 10 MG/1
10 TABLET ORAL
COMMUNITY
Start: 2023-01-24 | End: 2023-10-26 | Stop reason: ALTCHOICE

## 2023-10-11 RX ORDER — LORAZEPAM 0.5 MG/1
0.5 TABLET ORAL DAILY PRN
Qty: 90 TABLET | Refills: 0 | Status: SHIPPED | OUTPATIENT
Start: 2023-10-11 | End: 2024-01-10 | Stop reason: SDUPTHER

## 2023-10-11 RX ORDER — CLOPIDOGREL BISULFATE 75 MG/1
TABLET ORAL
COMMUNITY
End: 2023-12-08 | Stop reason: WASHOUT

## 2023-10-11 RX ORDER — ATORVASTATIN CALCIUM 40 MG/1
0.5 TABLET, FILM COATED ORAL NIGHTLY
COMMUNITY
Start: 2018-10-04 | End: 2023-10-26 | Stop reason: ALTCHOICE

## 2023-10-11 ASSESSMENT — ENCOUNTER SYMPTOMS
NERVOUS/ANXIOUS: 1
OCCASIONAL FEELINGS OF UNSTEADINESS: 0
CONSTITUTIONAL NEGATIVE: 1
ARTHRALGIAS: 1
COUGH: 1
LOSS OF SENSATION IN FEET: 0
DEPRESSION: 0

## 2023-10-11 ASSESSMENT — PAIN SCALES - GENERAL: PAINLEVEL: 0-NO PAIN

## 2023-10-11 NOTE — PROGRESS NOTES
"Subjective   Patient ID: Sravan Christiansen is a 89 y.o. male who presents for Follow-up (ANXIETY MED REFILLS).   3 month med refills  HPI     Review of Systems   Constitutional: Negative.    Respiratory:  Positive for cough.         Better since seen [facility]   Cardiovascular:         Dr Cardozo   Musculoskeletal:  Positive for arthralgias.   Psychiatric/Behavioral:  The patient is nervous/anxious.        Objective   /70 (BP Location: Right arm)   Pulse 72   Temp 36.7 °C (98 °F) (Temporal)   Ht 1.6 m (5' 3\")   Wt 52.8 kg (116 lb 6.4 oz)   SpO2 98%   BMI 20.62 kg/m²     Physical Exam  Vitals and nursing note reviewed.   Cardiovascular:      Rate and Rhythm: Regular rhythm.      Heart sounds: Normal heart sounds.   Pulmonary:      Breath sounds: Normal breath sounds.   Psychiatric:         Mood and Affect: Mood normal.         Behavior: Behavior normal.         Assessment/Plan patient seen here for follow-up on his lorazepam which continues to help him with sleep.  We also reviewed urgent care visits and medications that he took.  He is significantly improved now.  We will see him back in 3 months  Problem List Items Addressed This Visit             ICD-10-CM    Situational anxiety F41.8    Relevant Medications    LORazepam (Ativan) 0.5 mg tablet        OARRS:  Gerardo Marie,  on 10/11/2023 10:41 AM  I have personally reviewed the OARRS report for Sravan Christiansen. I have considered the risks of abuse, dependence, addiction and diversion    Is the patient prescribed a combination of a benzodiazepine and opioid?  Yes, I feel it is clincially indicated to continue the medication and have discussed with the patient risks/benefits/alternatives.    Last Urine Drug Screen / ordered today: No  Recent Results (from the past 8760 hour(s))   Drug Screen, Urine With Reflex to Confirmation    Collection Time: 03/03/23 11:17 AM   Result Value Ref Range    DRUG SCREEN COMMENT URINE SEE BELOW     Amphetamine " Screen, Urine PRESUMPTIVE NEGATIVE NEGATIVE    Barbiturate Screen, Urine PRESUMPTIVE NEGATIVE NEGATIVE    BENZODIAZEPINE (PRESENCE) IN URINE BY SCREEN METHOD PRESUMPTIVE NEGATIVE NEGATIVE    Cannabinoid Screen, Urine PRESUMPTIVE NEGATIVE NEGATIVE    Cocaine Screen, Urine PRESUMPTIVE NEGATIVE NEGATIVE    Fentanyl, Ur PRESUMPTIVE NEGATIVE NEGATIVE    Methadone Screen, Urine PRESUMPTIVE NEGATIVE NEGATIVE    Opiate Screen, Urine PRESUMPTIVE NEGATIVE NEGATIVE    Oxycodone Screen, Ur PRESUMPTIVE NEGATIVE NEGATIVE    PCP Screen, Urine PRESUMPTIVE NEGATIVE NEGATIVE     Results are as expected.     Controlled Substance Agreement:  Date of the Last Agreement: 3/3/23  Reviewed Controlled Substance Agreement including but not limited to the benefits, risks, and alternatives to treatment with a Controlled Substance medication(s).    Benzodiazepines:  What is the patient's goal of therapy? anxiety  Is this being achieved with current treatment? yes    BRITTANI-7:  No data recorded    Activities of Daily Living:   Is your overall impression that this patient is benefiting (symptom reduction outweighs side effects) from benzodiazepine therapy? Yes     1. Physical Functioning: Better  2. Family Relationship: Better  3. Social Relationship: Better  4. Mood: Better  5. Sleep Patterns: Better  6. Overall Function: Better

## 2023-10-13 ENCOUNTER — APPOINTMENT (OUTPATIENT)
Dept: PRIMARY CARE | Facility: CLINIC | Age: 88
End: 2023-10-13
Payer: MEDICARE

## 2023-10-25 PROBLEM — R91.1 PULMONARY NODULE: Status: ACTIVE | Noted: 2023-10-25

## 2023-10-25 PROBLEM — C61 CARCINOMA OF PROSTATE (MULTI): Status: ACTIVE | Noted: 2023-10-25

## 2023-10-25 PROBLEM — S52.532A FRACTURE, COLLES, LEFT, CLOSED: Status: ACTIVE | Noted: 2023-10-25

## 2023-10-25 PROBLEM — I35.0 AORTIC VALVE STENOSIS: Status: ACTIVE | Noted: 2023-10-25

## 2023-10-25 PROBLEM — Z95.2 S/P TAVR (TRANSCATHETER AORTIC VALVE REPLACEMENT): Status: ACTIVE | Noted: 2023-10-25

## 2023-10-26 ENCOUNTER — OFFICE VISIT (OUTPATIENT)
Dept: CARDIOLOGY | Facility: CLINIC | Age: 88
End: 2023-10-26
Payer: MEDICARE

## 2023-10-26 VITALS
SYSTOLIC BLOOD PRESSURE: 110 MMHG | BODY MASS INDEX: 20.91 KG/M2 | HEIGHT: 63 IN | WEIGHT: 118 LBS | DIASTOLIC BLOOD PRESSURE: 60 MMHG | OXYGEN SATURATION: 97 % | HEART RATE: 72 BPM

## 2023-10-26 DIAGNOSIS — I35.0 NONRHEUMATIC AORTIC VALVE STENOSIS: ICD-10-CM

## 2023-10-26 DIAGNOSIS — R00.1 BRADYCARDIA, SINUS: Primary | ICD-10-CM

## 2023-10-26 DIAGNOSIS — E78.2 HYPERLIPIDEMIA, MIXED: ICD-10-CM

## 2023-10-26 DIAGNOSIS — Z95.2 S/P TAVR (TRANSCATHETER AORTIC VALVE REPLACEMENT): ICD-10-CM

## 2023-10-26 DIAGNOSIS — I48.91 NEW ONSET ATRIAL FIBRILLATION (MULTI): ICD-10-CM

## 2023-10-26 DIAGNOSIS — I50.42 CHRONIC COMBINED SYSTOLIC AND DIASTOLIC HF (HEART FAILURE), NYHA CLASS 1 (MULTI): ICD-10-CM

## 2023-10-26 DIAGNOSIS — I10 ESSENTIAL HYPERTENSION: ICD-10-CM

## 2023-10-26 DIAGNOSIS — I25.118 CORONARY ARTERY DISEASE OF NATIVE ARTERY OF NATIVE HEART WITH STABLE ANGINA PECTORIS (CMS-HCC): ICD-10-CM

## 2023-10-26 DIAGNOSIS — R00.2 PALPITATIONS: ICD-10-CM

## 2023-10-26 DIAGNOSIS — Z95.5 PRESENCE OF STENT IN CORONARY ARTERY: ICD-10-CM

## 2023-10-26 PROBLEM — I50.9 CHF (CONGESTIVE HEART FAILURE) (MULTI): Status: RESOLVED | Noted: 2023-04-19 | Resolved: 2023-10-26

## 2023-10-26 PROCEDURE — 1126F AMNT PAIN NOTED NONE PRSNT: CPT | Performed by: INTERNAL MEDICINE

## 2023-10-26 PROCEDURE — 3078F DIAST BP <80 MM HG: CPT | Performed by: INTERNAL MEDICINE

## 2023-10-26 PROCEDURE — 1160F RVW MEDS BY RX/DR IN RCRD: CPT | Performed by: INTERNAL MEDICINE

## 2023-10-26 PROCEDURE — 99214 OFFICE O/P EST MOD 30 MIN: CPT | Performed by: INTERNAL MEDICINE

## 2023-10-26 PROCEDURE — 1036F TOBACCO NON-USER: CPT | Performed by: INTERNAL MEDICINE

## 2023-10-26 PROCEDURE — 3074F SYST BP LT 130 MM HG: CPT | Performed by: INTERNAL MEDICINE

## 2023-10-26 PROCEDURE — 1159F MED LIST DOCD IN RCRD: CPT | Performed by: INTERNAL MEDICINE

## 2023-10-26 ASSESSMENT — ENCOUNTER SYMPTOMS
DYSPNEA ON EXERTION: 1
SHORTNESS OF BREATH: 1

## 2023-10-26 NOTE — PATIENT INSTRUCTIONS
On Monday Wednesday and Friday take two furosemide 20 mg and on Tuesday, Thursday, Saturday and Sunday take one daily    In 3 weeks you need to get blood tests.    We will see you back in mid December.  If it is snowing, call us and we will reschedule.

## 2023-10-26 NOTE — PROGRESS NOTES
Subjective   Sravan Christiansen is a 89 y.o. male.    Chief Complaint:  Shortness of breath.    HPI    He had an episode of bronchitis requiring a brief hospitalization.  He feels that his shortness of breath and dyspnea is better.  He describes walking to the hospital from the parking lot and then walking back to our office without having to stop and catch his breath.  This is significantly improved in comparison to his prior evaluations.  He currently denies cough fever chills or increased sputum production.    The patient is status post transaortic valve replacement.  This was done on 2023.  A 34 mm valve was placed.    Cardiac catheterization performed on 2023 demonstrated mild coronary artery disease. Right heart catheterization demonstrated elevated pulmonary artery pressures and mildly elevated wedge pressure of 21.     The patient presented on 2015 with symptoms of chest discomfort and chest pressure. A stress thallium study was markedly abnormal showing a large area of anteroapical and inferoapical defect with an estimated ejection fraction of 41%. Cardiac catheterization was performed on 2015 which demonstrated abnormal left ventricular function with an estimated ejection fraction of 45%. The anterior descending had a 90% mid lesion treated with balloon angioplasty and stenting using a Promus premier drug-eluting stent. The circumflex had mild disease. The right coronary artery had mild disease.     The medical problems including history of bradycardia, hypertension, hyperlipidemia, and a past smoking history.     His wife  a few years ago.     Allergies  Medication    · Penicillins  Recorded By: Judi Landa; 2016 11:22:51 AM     Family History  Mother    · Family history of cerebral aneurysm (V17.1) (Z82.49)  Brother    · Family history of cardiac disorder (V17.49) (Z82.49)     Social History  Problems    · Active advance directive (V49.89) (Z78.9)   ·  "Consumes alcohol occasionally (V49.89) (Z78.9)   · Former smoker (V15.82) (Z87.891)   · QUIT IN MID 20'S      Review of Systems   Constitutional: Positive for malaise/fatigue.   Cardiovascular:  Positive for dyspnea on exertion.   Respiratory:  Positive for shortness of breath.    All other systems reviewed and are negative.      Objective   Constitutional:       Appearance: Not in distress.   Neck:      Vascular: JVD normal.   Pulmonary:      Breath sounds: Normal breath sounds.   Cardiovascular:      Normal rate. Regular rhythm. S1 with normal intensity. S2 with normal intensity.       Murmurs: There is a grade 2/6 systolic murmur.      No gallop.    Pulses:     Intact distal pulses.   Edema:     Peripheral edema absent.   Abdominal:      General: Bowel sounds are normal.   Neurological:      Mental Status: Alert and oriented to person, place and time.         Visit Vitals  /60 (BP Location: Left arm, Patient Position: Sitting, BP Cuff Size: Adult)   Pulse 72   Ht 1.6 m (5' 3\")   Wt 53.5 kg (118 lb)   SpO2 97%   BMI 20.90 kg/m²   Smoking Status Never   BSA 1.54 m²        Lab Review:   Lab Results   Component Value Date     10/11/2023    K 3.5 10/11/2023     10/11/2023    CO2 31 10/11/2023    BUN 45 (H) 10/11/2023    CREATININE 1.43 (H) 10/11/2023    GLUCOSE 104 (H) 10/11/2023    CALCIUM 9.7 10/11/2023       Assessment:    1.  Systolic and diastolic congestive heart failure.  His latest BNP is 1549.  This is down from his previous BNP of 2100.  He is valve is functioning normally.  Echo confirms normal valve function with a minimal perivalvular leak.  No murmur of aortic regurgitation is noted.    2.  Systolic and diastolic congestive heart failure.  Continued clinical improvement in his level of activities.    3.  Coronary artery disease.  Mild by cardiac catheterization.    4.  Paroxysmal atrial fibrillation.  Continues to maintain sinus rhythm.    5.  Renal insufficiency.  Most recent labs show " potassium 2.5, BUN 45, creatinine 1.5.

## 2023-11-20 ENCOUNTER — LAB (OUTPATIENT)
Dept: LAB | Facility: LAB | Age: 88
End: 2023-11-20
Payer: MEDICARE

## 2023-11-20 DIAGNOSIS — I25.118 CORONARY ARTERY DISEASE OF NATIVE ARTERY OF NATIVE HEART WITH STABLE ANGINA PECTORIS (CMS-HCC): ICD-10-CM

## 2023-11-20 DIAGNOSIS — I10 ESSENTIAL HYPERTENSION: ICD-10-CM

## 2023-11-20 DIAGNOSIS — I50.42 CHRONIC COMBINED SYSTOLIC AND DIASTOLIC HF (HEART FAILURE), NYHA CLASS 1 (MULTI): ICD-10-CM

## 2023-11-20 LAB
ANION GAP SERPL CALC-SCNC: 14 MMOL/L (ref 10–20)
BNP SERPL-MCNC: 1117 PG/ML (ref 0–99)
BUN SERPL-MCNC: 29 MG/DL (ref 6–23)
CALCIUM SERPL-MCNC: 9.2 MG/DL (ref 8.6–10.6)
CHLORIDE SERPL-SCNC: 104 MMOL/L (ref 98–107)
CO2 SERPL-SCNC: 29 MMOL/L (ref 21–32)
CREAT SERPL-MCNC: 1.33 MG/DL (ref 0.5–1.3)
GFR SERPL CREATININE-BSD FRML MDRD: 50 ML/MIN/1.73M*2
GLUCOSE SERPL-MCNC: 59 MG/DL (ref 74–99)
POTASSIUM SERPL-SCNC: 4.2 MMOL/L (ref 3.5–5.3)
SODIUM SERPL-SCNC: 143 MMOL/L (ref 136–145)

## 2023-11-20 PROCEDURE — 83880 ASSAY OF NATRIURETIC PEPTIDE: CPT

## 2023-11-20 PROCEDURE — 36415 COLL VENOUS BLD VENIPUNCTURE: CPT

## 2023-11-20 PROCEDURE — 80048 BASIC METABOLIC PNL TOTAL CA: CPT

## 2023-11-27 ENCOUNTER — TELEPHONE (OUTPATIENT)
Dept: CARDIOLOGY | Facility: CLINIC | Age: 88
End: 2023-11-27
Payer: MEDICARE

## 2023-11-27 NOTE — TELEPHONE ENCOUNTER
Patient aware of results.  He is taking his diuretic and feels great.  Patient will call us if he needs anything prior to his December 2023 appointment.     Hold Metformin/glipizide/janumet  will check FS insulin coverage as needed

## 2023-11-27 NOTE — TELEPHONE ENCOUNTER
----- Message from Nani Lugo RN sent at 11/27/2023  2:09 PM EST -----  Regarding: results  Per Dr. Cardooz, please call pt with results.    ----- Message -----  From: Adan Cardozo MD  Sent: 11/21/2023   8:27 AM EST  To: Nani Lugo RN    Is he taking a diuretic?

## 2023-12-07 RX ORDER — FUROSEMIDE 20 MG/1
20 TABLET ORAL SEE ADMIN INSTRUCTIONS
COMMUNITY
Start: 2023-11-24 | End: 2023-12-08 | Stop reason: ALTCHOICE

## 2023-12-08 ENCOUNTER — OFFICE VISIT (OUTPATIENT)
Dept: CARDIOLOGY | Facility: CLINIC | Age: 88
End: 2023-12-08
Payer: MEDICARE

## 2023-12-08 VITALS
WEIGHT: 122 LBS | DIASTOLIC BLOOD PRESSURE: 64 MMHG | BODY MASS INDEX: 20.83 KG/M2 | OXYGEN SATURATION: 97 % | HEIGHT: 64 IN | SYSTOLIC BLOOD PRESSURE: 102 MMHG | HEART RATE: 78 BPM

## 2023-12-08 DIAGNOSIS — I10 ESSENTIAL HYPERTENSION: ICD-10-CM

## 2023-12-08 DIAGNOSIS — R00.2 PALPITATIONS: ICD-10-CM

## 2023-12-08 DIAGNOSIS — I50.42 CHRONIC COMBINED SYSTOLIC AND DIASTOLIC HF (HEART FAILURE), NYHA CLASS 1 (MULTI): ICD-10-CM

## 2023-12-08 DIAGNOSIS — E78.2 HYPERLIPIDEMIA, MIXED: ICD-10-CM

## 2023-12-08 DIAGNOSIS — I48.91 NEW ONSET ATRIAL FIBRILLATION (MULTI): ICD-10-CM

## 2023-12-08 DIAGNOSIS — I35.0 NONRHEUMATIC AORTIC VALVE STENOSIS: ICD-10-CM

## 2023-12-08 DIAGNOSIS — Z95.2 S/P TAVR (TRANSCATHETER AORTIC VALVE REPLACEMENT): Primary | ICD-10-CM

## 2023-12-08 DIAGNOSIS — Z95.5 PRESENCE OF STENT IN CORONARY ARTERY: ICD-10-CM

## 2023-12-08 DIAGNOSIS — I25.10 CORONARY ARTERY DISEASE INVOLVING NATIVE CORONARY ARTERY OF NATIVE HEART WITHOUT ANGINA PECTORIS: ICD-10-CM

## 2023-12-08 PROCEDURE — 3074F SYST BP LT 130 MM HG: CPT | Performed by: INTERNAL MEDICINE

## 2023-12-08 PROCEDURE — 1159F MED LIST DOCD IN RCRD: CPT | Performed by: INTERNAL MEDICINE

## 2023-12-08 PROCEDURE — 99214 OFFICE O/P EST MOD 30 MIN: CPT | Performed by: INTERNAL MEDICINE

## 2023-12-08 PROCEDURE — 3078F DIAST BP <80 MM HG: CPT | Performed by: INTERNAL MEDICINE

## 2023-12-08 PROCEDURE — 1160F RVW MEDS BY RX/DR IN RCRD: CPT | Performed by: INTERNAL MEDICINE

## 2023-12-08 PROCEDURE — 1126F AMNT PAIN NOTED NONE PRSNT: CPT | Performed by: INTERNAL MEDICINE

## 2023-12-08 PROCEDURE — 1036F TOBACCO NON-USER: CPT | Performed by: INTERNAL MEDICINE

## 2023-12-08 RX ORDER — ROSUVASTATIN CALCIUM 5 MG/1
5 TABLET, COATED ORAL DAILY
Qty: 90 TABLET | Refills: 3 | Status: SHIPPED | OUTPATIENT
Start: 2023-12-08 | End: 2024-12-07

## 2023-12-08 RX ORDER — FUROSEMIDE 20 MG/1
20 TABLET ORAL DAILY
Qty: 90 TABLET | Refills: 3 | Status: SHIPPED | OUTPATIENT
Start: 2023-12-08 | End: 2024-12-07

## 2023-12-08 RX ORDER — CEPHALEXIN 500 MG/1
500 CAPSULE ORAL AS NEEDED
Qty: 12 CAPSULE | Refills: 1 | Status: SHIPPED | OUTPATIENT
Start: 2023-12-08 | End: 2023-12-09

## 2023-12-08 RX ORDER — LISINOPRIL 10 MG/1
10 TABLET ORAL DAILY
Qty: 90 TABLET | Refills: 3 | Status: SHIPPED | OUTPATIENT
Start: 2023-12-08 | End: 2024-12-07

## 2023-12-08 RX ORDER — METOPROLOL SUCCINATE 25 MG/1
12.5 TABLET, EXTENDED RELEASE ORAL DAILY
Qty: 45 TABLET | Refills: 3 | Status: SHIPPED | OUTPATIENT
Start: 2023-12-08 | End: 2024-12-07

## 2023-12-08 ASSESSMENT — ENCOUNTER SYMPTOMS
DYSPNEA ON EXERTION: 1
LIGHT-HEADEDNESS: 1

## 2023-12-08 NOTE — PATIENT INSTRUCTIONS
Your blood tests show continued improvement in your heart function.    Your kidney function is improving.  Basically back to normal.    Take furosemide 20 mg one daily    Continue on your other medications.    We will see you back in April with a limited echocardiogram    For dental work use Cephalexin 2.0 gm or four 500 mg tablets.  Take the medication one hour before the procedure

## 2023-12-08 NOTE — PROGRESS NOTES
Subjective   Sravan Christiansen is a 89 y.o. male.    Chief Complaint:  Follow-up transaortic valve replacement.  Follow-up systolic and diastolic congestive heart failure.    HPI    He continues to improve.  He is now exercising at the gym 2 to 3 days/week.  He exercises for about 2 hours.  Some of the exercise and is quite basic but other exercise that he does more vigorous activities.  He continues to improve in his level of activities.  He denies orthopnea or paroxysmal nocturnal dyspnea.  Reports no problems on his medications.  He gets lightheaded when he stands up quickly.  No syncopal events.    The patient is status post transaortic valve replacement.  This was done on 2023.  A 34 mm valve was placed.     Cardiac catheterization performed on 2023 demonstrated mild coronary artery disease. Right heart catheterization demonstrated elevated pulmonary artery pressures and mildly elevated wedge pressure of 21.     The patient presented on 2015 with symptoms of chest discomfort and chest pressure. A stress thallium study was markedly abnormal showing a large area of anteroapical and inferoapical defect with an estimated ejection fraction of 41%. Cardiac catheterization was performed on 2015 which demonstrated abnormal left ventricular function with an estimated ejection fraction of 45%. The anterior descending had a 90% mid lesion treated with balloon angioplasty and stenting using a Promus premier drug-eluting stent. The circumflex had mild disease. The right coronary artery had mild disease.     The medical problems including history of bradycardia, hypertension, hyperlipidemia, and a past smoking history.     His wife  a few years ago.      Allergies  Medication    · Penicillins  Recorded By: Judi Landa; 2016 11:22:51 AM     Family History  Mother    · Family history of cerebral aneurysm (V17.1) (Z82.49)  Brother    · Family history of cardiac disorder  (V17.49) (Z82.49)     Social History  Problems    · Active advance directive (V49.89) (Z78.9)   · Consumes alcohol occasionally (V49.89) (Z78.9)   · Former smoker (V15.82) (Z87.891)   · QUIT IN MID 20'S    Review of Systems   Cardiovascular:  Positive for dyspnea on exertion.   Musculoskeletal:  Positive for arthritis.   Neurological:  Positive for light-headedness.       Visit Vitals  Smoking Status Never        Objective     Constitutional:       Appearance: Not in distress.   Neck:      Vascular: JVD normal.   Pulmonary:      Breath sounds: Normal breath sounds.   Cardiovascular:      Normal rate. Regular rhythm. S1 with normal intensity. S2 with normal intensity.       Murmurs: There is a grade 2/6 systolic murmur.      No gallop.    Pulses:     Intact distal pulses.   Edema:     Peripheral edema absent.   Abdominal:      General: Bowel sounds are normal.   Neurological:      Mental Status: Alert and oriented to person, place and time.         Lab Review:   Lab Results   Component Value Date     11/20/2023    K 4.2 11/20/2023     11/20/2023    CO2 29 11/20/2023    BUN 29 (H) 11/20/2023    CREATININE 1.33 (H) 11/20/2023    GLUCOSE 59 (L) 11/20/2023    CALCIUM 9.2 11/20/2023     Lab Results   Component Value Date    CHOL 133 05/04/2023    TRIG 93 05/04/2023    HDL 68.7 05/04/2023       Assessment:    1.  Status post aortic valve replacement.  By exam his aortic valve is functioning normally.  No definite murmur of aortic regurgitation noted.  Follow-up echo studies have demonstrated normal valve function.  Will do a limited study on his next visit for follow-up.    2.  Coronary artery disease.  Mild by cardiac catheterization.  No anginal symptoms.    3.  Systolic and diastolic congestive heart failure.  His BNP has reduced from 20 100-11 100.  Not sure if he is actually compliant with Lasix.  We are going to have him take Lasix 20 mg daily.    4.  Renal sufficiency.  Creatinine has improved from  1.7-1.3.    5.  Antibiotic prophylaxis.  He has a penicillin allergy.  Will use cephalosporin 2 g daily prior to dental procedures.

## 2023-12-14 ENCOUNTER — TELEPHONE (OUTPATIENT)
Dept: CARDIOLOGY | Facility: CLINIC | Age: 88
End: 2023-12-14
Payer: MEDICARE

## 2023-12-14 NOTE — TELEPHONE ENCOUNTER
Cross reactivity with the cephalosporin is RARE.  He will be safe to use the cephalexin.  We were well aware of the potential for cross-reactivity

## 2023-12-14 NOTE — TELEPHONE ENCOUNTER
Pharmacist contacted the office asking for allergy verification. The patient was at the pharmacy so she handed him the phone. He mentioned that anytime he gets any Rx's with Penicillin in it his arms swell up or he gets a rash all over. The pharmacist noticed that this Rx, Cephalexin, does have traces of penicillin in it and wanted to be sure he does not have any reactions. Is there anything else Dr. Cardozo would recommend the patient to take prior to his dental work?    Dental work is either on January the 5th or 8th. I advised both the patient and the pharmacy to not have this filled yet and wait for our office staffs response.     Sravan would also like a telephone call to his house phone for clarification.

## 2023-12-14 NOTE — TELEPHONE ENCOUNTER
Please see message regarding antibiotic and PCN allergy.    Please advise of new antibiotic. Thanks.

## 2023-12-15 NOTE — TELEPHONE ENCOUNTER
Called [facility] and spoke to Diony, pharmacy intern and notified ok to take cephalexin per Dr. Cardozo and explained rationale. Diony verbalizes understanding. Pt notified.

## 2024-01-08 ENCOUNTER — ANCILLARY PROCEDURE (OUTPATIENT)
Dept: RADIOLOGY | Facility: CLINIC | Age: 89
End: 2024-01-08
Payer: MEDICARE

## 2024-01-08 ENCOUNTER — OFFICE VISIT (OUTPATIENT)
Dept: PRIMARY CARE | Facility: CLINIC | Age: 89
End: 2024-01-08
Payer: MEDICARE

## 2024-01-08 VITALS
HEIGHT: 63 IN | HEART RATE: 58 BPM | DIASTOLIC BLOOD PRESSURE: 72 MMHG | OXYGEN SATURATION: 95 % | TEMPERATURE: 97.5 F | BODY MASS INDEX: 22.5 KG/M2 | SYSTOLIC BLOOD PRESSURE: 130 MMHG | WEIGHT: 127 LBS

## 2024-01-08 DIAGNOSIS — M75.102 ROTATOR CUFF TEAR ARTHROPATHY OF BOTH SHOULDERS: ICD-10-CM

## 2024-01-08 DIAGNOSIS — M12.812 ROTATOR CUFF TEAR ARTHROPATHY OF BOTH SHOULDERS: ICD-10-CM

## 2024-01-08 DIAGNOSIS — M75.101 ROTATOR CUFF TEAR ARTHROPATHY OF BOTH SHOULDERS: ICD-10-CM

## 2024-01-08 DIAGNOSIS — M15.9 GENERALIZED OSTEOARTHRITIS OF MULTIPLE SITES: ICD-10-CM

## 2024-01-08 DIAGNOSIS — M12.811 ROTATOR CUFF TEAR ARTHROPATHY OF BOTH SHOULDERS: ICD-10-CM

## 2024-01-08 DIAGNOSIS — I50.42 CHRONIC COMBINED SYSTOLIC AND DIASTOLIC HF (HEART FAILURE), NYHA CLASS 1 (MULTI): ICD-10-CM

## 2024-01-08 DIAGNOSIS — M25.511 ACUTE PAIN OF BOTH SHOULDERS: Primary | ICD-10-CM

## 2024-01-08 DIAGNOSIS — M25.512 ACUTE PAIN OF BOTH SHOULDERS: ICD-10-CM

## 2024-01-08 DIAGNOSIS — M25.511 ACUTE PAIN OF BOTH SHOULDERS: ICD-10-CM

## 2024-01-08 DIAGNOSIS — M25.512 ACUTE PAIN OF BOTH SHOULDERS: Primary | ICD-10-CM

## 2024-01-08 DIAGNOSIS — I10 ESSENTIAL HYPERTENSION: ICD-10-CM

## 2024-01-08 PROCEDURE — 3075F SYST BP GE 130 - 139MM HG: CPT | Performed by: FAMILY MEDICINE

## 2024-01-08 PROCEDURE — 73030 X-RAY EXAM OF SHOULDER: CPT | Mod: RT

## 2024-01-08 PROCEDURE — 3078F DIAST BP <80 MM HG: CPT | Performed by: FAMILY MEDICINE

## 2024-01-08 PROCEDURE — 1159F MED LIST DOCD IN RCRD: CPT | Performed by: FAMILY MEDICINE

## 2024-01-08 PROCEDURE — 1160F RVW MEDS BY RX/DR IN RCRD: CPT | Performed by: FAMILY MEDICINE

## 2024-01-08 PROCEDURE — 73030 X-RAY EXAM OF SHOULDER: CPT | Mod: LT

## 2024-01-08 PROCEDURE — 1036F TOBACCO NON-USER: CPT | Performed by: FAMILY MEDICINE

## 2024-01-08 PROCEDURE — 1126F AMNT PAIN NOTED NONE PRSNT: CPT | Performed by: FAMILY MEDICINE

## 2024-01-08 PROCEDURE — 99213 OFFICE O/P EST LOW 20 MIN: CPT | Performed by: FAMILY MEDICINE

## 2024-01-08 PROCEDURE — 73030 X-RAY EXAM OF SHOULDER: CPT | Mod: RIGHT SIDE | Performed by: RADIOLOGY

## 2024-01-08 ASSESSMENT — ENCOUNTER SYMPTOMS
DEPRESSION: 0
ARTHRALGIAS: 1
MYALGIAS: 1
LOSS OF SENSATION IN FEET: 0
OCCASIONAL FEELINGS OF UNSTEADINESS: 0

## 2024-01-08 ASSESSMENT — PAIN SCALES - GENERAL: PAINLEVEL: 0-NO PAIN

## 2024-01-08 ASSESSMENT — SOCIAL DETERMINANTS OF HEALTH (SDOH): IN THE PAST 12 MONTHS, HAS THE ELECTRIC, GAS, OIL, OR WATER COMPANY THREATENED TO SHUT OFF SERVICE IN YOUR HOME?: NO

## 2024-01-08 NOTE — PROGRESS NOTES
"Subjective   Patient ID: Sravan Christiansen is a 89 y.o. male who presents for misc (Check both shoulder problems x6 months).    HPI     Review of Systems   Cardiovascular:         Dr Cardozo   Musculoskeletal:  Positive for arthralgias and myalgias.       Objective   /72 (BP Location: Left arm)   Pulse 58   Temp 36.4 °C (97.5 °F) (Temporal)   Ht 1.6 m (5' 3\")   Wt 57.6 kg (127 lb)   SpO2 95%   BMI 22.50 kg/m²     Physical Exam  Vitals and nursing note reviewed.   Musculoskeletal:      Comments: Bilateral probable rotator cuff chronic tears of both shoulders decreased elevation bilaterally with rotator cuff weakness bilaterally         Assessment/Plan patient seen here with pain in both shoulders.  I feel he has chronic rotator cuff tears on both sides were getting x-rays but sending him to physical therapy he will let me know if he does not improve.  Problem List Items Addressed This Visit             ICD-10-CM    Essential hypertension I10    Generalized osteoarthritis of multiple sites M15.9    Shoulder pain - Primary M25.519    Relevant Orders    XR shoulder left 2+ views    XR shoulder right 2+ views    Referral to Physical Therapy    Chronic combined systolic and diastolic HF (heart failure), NYHA class 1 (CMS/[facility]) I50.42     Other Visit Diagnoses         Codes    Rotator cuff tear arthropathy of both shoulders     M75.101, M12.811, M12.812, M75.102    Relevant Orders    XR shoulder left 2+ views    XR shoulder right 2+ views    Referral to Physical Therapy               "

## 2024-01-10 ENCOUNTER — TELEPHONE (OUTPATIENT)
Dept: PRIMARY CARE | Facility: CLINIC | Age: 89
End: 2024-01-10
Payer: MEDICARE

## 2024-01-10 DIAGNOSIS — F41.8 SITUATIONAL ANXIETY: ICD-10-CM

## 2024-01-10 RX ORDER — LORAZEPAM 0.5 MG/1
0.5 TABLET ORAL DAILY PRN
Qty: 90 TABLET | Refills: 0 | Status: SHIPPED | OUTPATIENT
Start: 2024-01-10 | End: 2024-04-12 | Stop reason: SDUPTHER

## 2024-01-22 ENCOUNTER — OFFICE VISIT (OUTPATIENT)
Dept: PRIMARY CARE | Facility: CLINIC | Age: 89
End: 2024-01-22
Payer: MEDICARE

## 2024-01-22 VITALS
HEIGHT: 65 IN | BODY MASS INDEX: 20.66 KG/M2 | TEMPERATURE: 97.3 F | DIASTOLIC BLOOD PRESSURE: 70 MMHG | HEART RATE: 74 BPM | WEIGHT: 124 LBS | SYSTOLIC BLOOD PRESSURE: 112 MMHG | OXYGEN SATURATION: 97 %

## 2024-01-22 DIAGNOSIS — M12.812 ROTATOR CUFF TEAR ARTHROPATHY OF BOTH SHOULDERS: Primary | ICD-10-CM

## 2024-01-22 DIAGNOSIS — M12.811 ROTATOR CUFF TEAR ARTHROPATHY OF BOTH SHOULDERS: Primary | ICD-10-CM

## 2024-01-22 DIAGNOSIS — M75.101 ROTATOR CUFF TEAR ARTHROPATHY OF BOTH SHOULDERS: Primary | ICD-10-CM

## 2024-01-22 DIAGNOSIS — M75.102 ROTATOR CUFF TEAR ARTHROPATHY OF BOTH SHOULDERS: Primary | ICD-10-CM

## 2024-01-22 PROCEDURE — 3078F DIAST BP <80 MM HG: CPT | Performed by: FAMILY MEDICINE

## 2024-01-22 PROCEDURE — 99213 OFFICE O/P EST LOW 20 MIN: CPT | Performed by: FAMILY MEDICINE

## 2024-01-22 PROCEDURE — 1036F TOBACCO NON-USER: CPT | Performed by: FAMILY MEDICINE

## 2024-01-22 PROCEDURE — 1160F RVW MEDS BY RX/DR IN RCRD: CPT | Performed by: FAMILY MEDICINE

## 2024-01-22 PROCEDURE — 3074F SYST BP LT 130 MM HG: CPT | Performed by: FAMILY MEDICINE

## 2024-01-22 PROCEDURE — 1126F AMNT PAIN NOTED NONE PRSNT: CPT | Performed by: FAMILY MEDICINE

## 2024-01-22 PROCEDURE — 1159F MED LIST DOCD IN RCRD: CPT | Performed by: FAMILY MEDICINE

## 2024-01-22 RX ORDER — LIDOCAINE HYDROCHLORIDE 10 MG/ML
1 INJECTION INFILTRATION; PERINEURAL ONCE
Status: COMPLETED | OUTPATIENT
Start: 2024-01-22 | End: 2024-01-22

## 2024-01-22 RX ORDER — HYDROCORTISONE 25 MG/G
CREAM TOPICAL AS NEEDED
COMMUNITY

## 2024-01-22 RX ORDER — TRAMADOL HYDROCHLORIDE 50 MG/1
TABLET ORAL EVERY 6 HOURS PRN
COMMUNITY
Start: 2019-08-05 | End: 2024-05-06 | Stop reason: ALTCHOICE

## 2024-01-22 RX ORDER — ACETAMINOPHEN 325 MG/1
TABLET ORAL EVERY 8 HOURS
COMMUNITY
Start: 2019-08-05

## 2024-01-22 RX ORDER — METHYLPREDNISOLONE ACETATE 80 MG/ML
80 INJECTION, SUSPENSION INTRA-ARTICULAR; INTRALESIONAL; INTRAMUSCULAR; SOFT TISSUE ONCE
Status: COMPLETED | OUTPATIENT
Start: 2024-01-22 | End: 2024-01-22

## 2024-01-22 RX ORDER — DOCUSATE SODIUM 100 MG/1
CAPSULE, LIQUID FILLED ORAL EVERY 12 HOURS
COMMUNITY
Start: 2019-08-05

## 2024-01-22 RX ORDER — MINERAL OIL
ENEMA (ML) RECTAL
COMMUNITY
Start: 2022-02-09

## 2024-01-22 RX ORDER — CEPHALEXIN 500 MG/1
CAPSULE ORAL
COMMUNITY
Start: 2023-12-08 | End: 2024-05-06 | Stop reason: ALTCHOICE

## 2024-01-22 RX ORDER — OXYCODONE AND ACETAMINOPHEN 5; 325 MG/1; MG/1
TABLET ORAL EVERY 6 HOURS
COMMUNITY
Start: 2018-11-03 | End: 2024-05-06 | Stop reason: ALTCHOICE

## 2024-01-22 RX ADMIN — METHYLPREDNISOLONE ACETATE 80 MG: 80 INJECTION, SUSPENSION INTRA-ARTICULAR; INTRALESIONAL; INTRAMUSCULAR; SOFT TISSUE at 14:12

## 2024-01-22 RX ADMIN — LIDOCAINE HYDROCHLORIDE 10 MG: 10 INJECTION INFILTRATION; PERINEURAL at 14:11

## 2024-01-22 ASSESSMENT — ENCOUNTER SYMPTOMS
OCCASIONAL FEELINGS OF UNSTEADINESS: 0
DEPRESSION: 0
LOSS OF SENSATION IN FEET: 0

## 2024-01-22 ASSESSMENT — PAIN SCALES - GENERAL: PAINLEVEL: 0-NO PAIN

## 2024-01-22 NOTE — PROGRESS NOTES
"Subjective   Patient ID: Sravan Christiansen is a 89 y.o. male who presents for c/o (Both shoulder problems).    HPI     Review of Systems   Musculoskeletal:         Pain left shoulder        Objective   /70 (BP Location: Left arm)   Pulse 74   Temp 36.3 °C (97.3 °F) (Temporal)   Ht 1.651 m (5' 5\")   Wt 56.2 kg (124 lb)   SpO2 97%   BMI 20.63 kg/m²     Physical Exam  Vitals and nursing note reviewed.   Musculoskeletal:      Comments: Tenderness left shoulder with decreased range of motion also decreased rotator cuff function.  Also with tenderness right shoulder with decreased range of motion and decreased rotator cuff function.     Patient ID: Sravan Christiansen is a 89 y.o. male.    Procedures after sterile preparation the right shoulder was injected with Decadron Depo-Medrol and 1% lidocaine    Assessment/Plan patient seen for a intra-articular injection in his right shoulder.  I will see him back as needed  Problem List Items Addressed This Visit    None  Visit Diagnoses         Codes    Rotator cuff tear arthropathy of both shoulders    -  Primary M75.101, M12.811, M12.812, M75.102    Relevant Medications    methylPREDNISolone acetate (DEPO-Medrol) injection 80 mg (Start on 1/22/2024  1:00 PM)    dexAMETHasone (Decadron) injection 4 mg (Start on 1/22/2024  1:00 PM)    lidocaine (Xylocaine) 10 mg/mL (1 %) injection 10 mg (Start on 1/22/2024  1:00 PM)               "

## 2024-01-24 ENCOUNTER — APPOINTMENT (OUTPATIENT)
Dept: PRIMARY CARE | Facility: CLINIC | Age: 89
End: 2024-01-24
Payer: MEDICARE

## 2024-04-09 PROBLEM — M12.811 ROTATOR CUFF ARTHROPATHY OF BOTH SHOULDERS: Status: ACTIVE | Noted: 2024-04-09

## 2024-04-09 PROBLEM — Z86.79 HISTORY OF HYPERTENSION: Status: ACTIVE | Noted: 2024-04-09

## 2024-04-09 PROBLEM — I71.20 THORACIC AORTIC ANEURYSM, WITHOUT RUPTURE, UNSPECIFIED (CMS-HCC): Status: ACTIVE | Noted: 2023-05-18

## 2024-04-09 PROBLEM — M12.812 ROTATOR CUFF ARTHROPATHY OF BOTH SHOULDERS: Status: ACTIVE | Noted: 2024-04-09

## 2024-04-09 PROBLEM — Z86.79 HISTORY OF ANGINA PECTORIS: Status: ACTIVE | Noted: 2024-04-09

## 2024-04-09 PROBLEM — R94.39 ABNORMAL THALLIUM STRESS TEST: Status: ACTIVE | Noted: 2024-04-09

## 2024-04-09 PROBLEM — D17.9 BENIGN LIPOMATOUS NEOPLASM: Status: ACTIVE | Noted: 2024-04-09

## 2024-04-09 PROBLEM — Z86.79 HISTORY OF HYPERTENSION: Status: RESOLVED | Noted: 2024-04-09 | Resolved: 2024-04-09

## 2024-04-12 DIAGNOSIS — F41.8 SITUATIONAL ANXIETY: ICD-10-CM

## 2024-04-18 ENCOUNTER — OFFICE VISIT (OUTPATIENT)
Dept: CARDIOLOGY | Facility: CLINIC | Age: 89
End: 2024-04-18
Payer: MEDICARE

## 2024-04-18 ENCOUNTER — HOSPITAL ENCOUNTER (OUTPATIENT)
Dept: CARDIOLOGY | Facility: CLINIC | Age: 89
Discharge: HOME | End: 2024-04-18
Payer: MEDICARE

## 2024-04-18 VITALS
HEART RATE: 88 BPM | DIASTOLIC BLOOD PRESSURE: 70 MMHG | HEIGHT: 65 IN | OXYGEN SATURATION: 96 % | SYSTOLIC BLOOD PRESSURE: 112 MMHG | WEIGHT: 123 LBS | BODY MASS INDEX: 20.49 KG/M2

## 2024-04-18 VITALS — WEIGHT: 124 LBS | HEIGHT: 65 IN | BODY MASS INDEX: 20.66 KG/M2

## 2024-04-18 DIAGNOSIS — E78.2 HYPERLIPIDEMIA, MIXED: ICD-10-CM

## 2024-04-18 DIAGNOSIS — Z86.79 HISTORY OF ANGINA PECTORIS: ICD-10-CM

## 2024-04-18 DIAGNOSIS — I25.10 CORONARY ARTERY DISEASE INVOLVING NATIVE CORONARY ARTERY OF NATIVE HEART WITHOUT ANGINA PECTORIS: ICD-10-CM

## 2024-04-18 DIAGNOSIS — I50.42 CHRONIC COMBINED SYSTOLIC AND DIASTOLIC HF (HEART FAILURE), NYHA CLASS 1 (MULTI): ICD-10-CM

## 2024-04-18 DIAGNOSIS — I50.42 CHRONIC COMBINED SYSTOLIC AND DIASTOLIC HF (HEART FAILURE), NYHA CLASS 1 (MULTI): Primary | ICD-10-CM

## 2024-04-18 DIAGNOSIS — Z95.2 S/P TAVR (TRANSCATHETER AORTIC VALVE REPLACEMENT): ICD-10-CM

## 2024-04-18 DIAGNOSIS — I10 ESSENTIAL HYPERTENSION: ICD-10-CM

## 2024-04-18 DIAGNOSIS — I71.21 ANEURYSM OF ASCENDING AORTA WITHOUT RUPTURE (CMS-HCC): ICD-10-CM

## 2024-04-18 DIAGNOSIS — R53.82 CHRONIC FATIGUE: ICD-10-CM

## 2024-04-18 DIAGNOSIS — Z95.5 PRESENCE OF STENT IN CORONARY ARTERY: ICD-10-CM

## 2024-04-18 PROBLEM — R94.39 ABNORMAL THALLIUM STRESS TEST: Status: RESOLVED | Noted: 2024-04-09 | Resolved: 2024-04-18

## 2024-04-18 PROBLEM — R00.1 BRADYCARDIA, SINUS: Status: RESOLVED | Noted: 2023-03-24 | Resolved: 2024-04-18

## 2024-04-18 PROBLEM — I35.0 AORTIC VALVE STENOSIS: Status: RESOLVED | Noted: 2023-10-25 | Resolved: 2024-04-18

## 2024-04-18 PROCEDURE — 93308 TTE F-UP OR LMTD: CPT

## 2024-04-18 PROCEDURE — 1157F ADVNC CARE PLAN IN RCRD: CPT | Performed by: INTERNAL MEDICINE

## 2024-04-18 PROCEDURE — 1036F TOBACCO NON-USER: CPT | Performed by: INTERNAL MEDICINE

## 2024-04-18 PROCEDURE — 1160F RVW MEDS BY RX/DR IN RCRD: CPT | Performed by: INTERNAL MEDICINE

## 2024-04-18 PROCEDURE — 93308 TTE F-UP OR LMTD: CPT | Performed by: INTERNAL MEDICINE

## 2024-04-18 PROCEDURE — 3074F SYST BP LT 130 MM HG: CPT | Performed by: INTERNAL MEDICINE

## 2024-04-18 PROCEDURE — 99214 OFFICE O/P EST MOD 30 MIN: CPT | Performed by: INTERNAL MEDICINE

## 2024-04-18 PROCEDURE — 3078F DIAST BP <80 MM HG: CPT | Performed by: INTERNAL MEDICINE

## 2024-04-18 PROCEDURE — 1159F MED LIST DOCD IN RCRD: CPT | Performed by: INTERNAL MEDICINE

## 2024-04-18 ASSESSMENT — ENCOUNTER SYMPTOMS: DYSPNEA ON EXERTION: 1

## 2024-04-18 NOTE — PROGRESS NOTES
Subjective   Sravan Christiansen is a 89 y.o. male.    Chief Complaint:  Shortness of breath and dyspnea on exertion.    HPI    He feels quite poorly.  He is very weak.  Gets short of breath and dyspneic with even mild exertion.  No anginal symptoms.  Denies orthopnea or paroxysmal nocturnal dyspnea.  Denies increased edema.  When he does mild activities such as walking to his mailbox and back he has to sit down and rest to catch his breath.  He feels that he is deteriorating.    The patient is status post transaortic valve replacement.  This was done on 2023.  A 34 mm valve was placed.     Cardiac catheterization performed on 2023 demonstrated mild coronary artery disease. Right heart catheterization demonstrated elevated pulmonary artery pressures and mildly elevated wedge pressure of 21.     The patient presented on 2015 with symptoms of chest discomfort and chest pressure. A stress thallium study was markedly abnormal showing a large area of anteroapical and inferoapical defect with an estimated ejection fraction of 41%. Cardiac catheterization was performed on 2015 which demonstrated abnormal left ventricular function with an estimated ejection fraction of 45%. The anterior descending had a 90% mid lesion treated with balloon angioplasty and stenting using a Promus premier drug-eluting stent. The circumflex had mild disease. The right coronary artery had mild disease.     The medical problems including history of bradycardia, hypertension, hyperlipidemia, and a past smoking history.     His wife  a few years ago.      Allergies  Medication    · Penicillins  Recorded By: Judi Landa; 2016 11:22:51 AM     Family History  Mother    · Family history of cerebral aneurysm (V17.1) (Z82.49)  Brother    · Family history of cardiac disorder (V17.49) (Z82.49)     Social History  Problems    · Active advance directive (V49.89) (Z78.9)   · Consumes alcohol occasionally  "(V49.89) (Z78.9)   · Former smoker (V15.82) (Z87.891)   · QUIT IN MID 20'S    Review of Systems   Constitutional: Positive for malaise/fatigue.   Cardiovascular:  Positive for dyspnea on exertion.   Musculoskeletal:  Positive for arthritis.   All other systems reviewed and are negative.      Visit Vitals  /70 (BP Location: Left arm)   Pulse 88   Ht 1.651 m (5' 5\")   Wt 55.8 kg (123 lb)   SpO2 96%   BMI 20.47 kg/m²   Smoking Status Never   BSA 1.6 m²        Objective     Constitutional:       Appearance: Not in distress.   Neck:      Vascular: JVD normal.   Pulmonary:      Breath sounds: Normal breath sounds.   Cardiovascular:      Normal rate. Regular rhythm. S1 with normal intensity. S2 with normal intensity.       Murmurs: There is a grade 2/6 systolic murmur.      No gallop.    Pulses:     Intact distal pulses.   Edema:     Peripheral edema absent.   Abdominal:      General: Bowel sounds are normal.   Neurological:      Mental Status: Alert and oriented to person, place and time.         Lab Review:   Lab Results   Component Value Date     11/20/2023    K 4.2 11/20/2023     11/20/2023    CO2 29 11/20/2023    BUN 29 (H) 11/20/2023    CREATININE 1.33 (H) 11/20/2023    GLUCOSE 59 (L) 11/20/2023    CALCIUM 9.2 11/20/2023     Lab Results   Component Value Date    CHOL 133 05/04/2023    TRIG 93 05/04/2023    HDL 68.7 05/04/2023       Assessment:    1.  Systolic congestive heart failure.  Today's echocardiographic study shows a left ventricular ejection fraction of 15% to 20%.  This is unchanged.  Has severe left ventricular dysfunction with some deterioration in his overall clinical status.  Ultimately may have to go to hospice care because of his poor left ventricular function.  We are going to give him a trial of Farxiga 10 mg daily.    2.  Status post aortic valve replacement.  The aortic valve is functioning normally.    3.  Coronary artery disease.  Mild on the basis of cardiac catheterization in " April 2023.    4.  Bilateral renal sufficiency.  Maintaining renal function.

## 2024-04-18 NOTE — PATIENT INSTRUCTIONS
Start Farxiga 10 mg one daily for your heart.    Call your insurance and find out about the cost of this medication.    If you feel ok on this medication call us and we will send in a 90 day supply to [facility].

## 2024-04-19 ENCOUNTER — LAB (OUTPATIENT)
Dept: LAB | Facility: LAB | Age: 89
End: 2024-04-19
Payer: MEDICARE

## 2024-04-19 DIAGNOSIS — I50.42 CHRONIC COMBINED SYSTOLIC AND DIASTOLIC HF (HEART FAILURE), NYHA CLASS 1 (MULTI): ICD-10-CM

## 2024-04-19 DIAGNOSIS — R53.82 CHRONIC FATIGUE: ICD-10-CM

## 2024-04-19 DIAGNOSIS — I10 ESSENTIAL HYPERTENSION: ICD-10-CM

## 2024-04-19 DIAGNOSIS — I25.10 CORONARY ARTERY DISEASE INVOLVING NATIVE CORONARY ARTERY OF NATIVE HEART WITHOUT ANGINA PECTORIS: ICD-10-CM

## 2024-04-19 LAB
ANION GAP SERPL CALC-SCNC: 12 MMOL/L (ref 10–20)
BUN SERPL-MCNC: 29 MG/DL (ref 6–23)
CALCIUM SERPL-MCNC: 9 MG/DL (ref 8.6–10.6)
CHLORIDE SERPL-SCNC: 106 MMOL/L (ref 98–107)
CO2 SERPL-SCNC: 29 MMOL/L (ref 21–32)
CREAT SERPL-MCNC: 1.4 MG/DL (ref 0.5–1.3)
EGFRCR SERPLBLD CKD-EPI 2021: 47 ML/MIN/1.73M*2
ERYTHROCYTE [DISTWIDTH] IN BLOOD BY AUTOMATED COUNT: 13.6 % (ref 11.5–14.5)
GLUCOSE SERPL-MCNC: 85 MG/DL (ref 74–99)
HCT VFR BLD AUTO: 38.4 % (ref 41–52)
HGB BLD-MCNC: 11.9 G/DL (ref 13.5–17.5)
MCH RBC QN AUTO: 30.9 PG (ref 26–34)
MCHC RBC AUTO-ENTMCNC: 31 G/DL (ref 32–36)
MCV RBC AUTO: 100 FL (ref 80–100)
NRBC BLD-RTO: 0 /100 WBCS (ref 0–0)
PLATELET # BLD AUTO: 158 X10*3/UL (ref 150–450)
POTASSIUM SERPL-SCNC: 4.1 MMOL/L (ref 3.5–5.3)
RBC # BLD AUTO: 3.85 X10*6/UL (ref 4.5–5.9)
SODIUM SERPL-SCNC: 143 MMOL/L (ref 136–145)
WBC # BLD AUTO: 5.6 X10*3/UL (ref 4.4–11.3)

## 2024-04-19 PROCEDURE — 36415 COLL VENOUS BLD VENIPUNCTURE: CPT

## 2024-04-19 PROCEDURE — 85027 COMPLETE CBC AUTOMATED: CPT

## 2024-04-19 PROCEDURE — 80048 BASIC METABOLIC PNL TOTAL CA: CPT

## 2024-04-22 ENCOUNTER — OFFICE VISIT (OUTPATIENT)
Dept: OTOLARYNGOLOGY | Facility: CLINIC | Age: 89
End: 2024-04-22
Payer: MEDICARE

## 2024-04-22 VITALS
TEMPERATURE: 98.6 F | OXYGEN SATURATION: 98 % | RESPIRATION RATE: 16 BRPM | WEIGHT: 124.8 LBS | DIASTOLIC BLOOD PRESSURE: 78 MMHG | HEART RATE: 69 BPM | SYSTOLIC BLOOD PRESSURE: 118 MMHG | BODY MASS INDEX: 20.79 KG/M2 | HEIGHT: 65 IN

## 2024-04-22 DIAGNOSIS — H91.93 BILATERAL HEARING LOSS, UNSPECIFIED HEARING LOSS TYPE: Primary | ICD-10-CM

## 2024-04-22 LAB
AORTIC VALVE MEAN GRADIENT: 6.2 MMHG
AORTIC VALVE PEAK VELOCITY: 1.64 M/S
AV PEAK GRADIENT: 10.8 MMHG
AVA (PEAK VEL): 1.52 CM2
AVA (VTI): 1.44 CM2
EJECTION FRACTION APICAL 4 CHAMBER: 19.6
LEFT VENTRICLE INTERNAL DIMENSION DIASTOLE: 4.99 CM (ref 3.5–6)
LEFT VENTRICULAR OUTFLOW TRACT DIAMETER: 2 CM
LV EJECTION FRACTION BIPLANE: 15 %
MITRAL VALVE E/A RATIO: 1.91
RIGHT VENTRICLE PEAK SYSTOLIC PRESSURE: 33.4 MMHG

## 2024-04-22 PROCEDURE — 99212 OFFICE O/P EST SF 10 MIN: CPT | Performed by: NURSE PRACTITIONER

## 2024-04-22 PROCEDURE — 3074F SYST BP LT 130 MM HG: CPT | Performed by: NURSE PRACTITIONER

## 2024-04-22 PROCEDURE — 1160F RVW MEDS BY RX/DR IN RCRD: CPT | Performed by: NURSE PRACTITIONER

## 2024-04-22 PROCEDURE — 1036F TOBACCO NON-USER: CPT | Performed by: NURSE PRACTITIONER

## 2024-04-22 PROCEDURE — 1157F ADVNC CARE PLAN IN RCRD: CPT | Performed by: NURSE PRACTITIONER

## 2024-04-22 PROCEDURE — 1126F AMNT PAIN NOTED NONE PRSNT: CPT | Performed by: NURSE PRACTITIONER

## 2024-04-22 PROCEDURE — 3078F DIAST BP <80 MM HG: CPT | Performed by: NURSE PRACTITIONER

## 2024-04-22 PROCEDURE — 1159F MED LIST DOCD IN RCRD: CPT | Performed by: NURSE PRACTITIONER

## 2024-04-22 ASSESSMENT — PATIENT HEALTH QUESTIONNAIRE - PHQ9
2. FEELING DOWN, DEPRESSED OR HOPELESS: NOT AT ALL
SUM OF ALL RESPONSES TO PHQ9 QUESTIONS 1 AND 2: 0
1. LITTLE INTEREST OR PLEASURE IN DOING THINGS: NOT AT ALL

## 2024-04-22 ASSESSMENT — COLUMBIA-SUICIDE SEVERITY RATING SCALE - C-SSRS
2. HAVE YOU ACTUALLY HAD ANY THOUGHTS OF KILLING YOURSELF?: NO
6. HAVE YOU EVER DONE ANYTHING, STARTED TO DO ANYTHING, OR PREPARED TO DO ANYTHING TO END YOUR LIFE?: NO
1. IN THE PAST MONTH, HAVE YOU WISHED YOU WERE DEAD OR WISHED YOU COULD GO TO SLEEP AND NOT WAKE UP?: NO

## 2024-04-22 ASSESSMENT — LIFESTYLE VARIABLES
HOW MANY STANDARD DRINKS CONTAINING ALCOHOL DO YOU HAVE ON A TYPICAL DAY: 1 OR 2
SKIP TO QUESTIONS 9-10: 1
HOW OFTEN DO YOU HAVE SIX OR MORE DRINKS ON ONE OCCASION: NEVER
AUDIT-C TOTAL SCORE: 1
HOW OFTEN DO YOU HAVE A DRINK CONTAINING ALCOHOL: MONTHLY OR LESS

## 2024-04-22 ASSESSMENT — ENCOUNTER SYMPTOMS
DEPRESSION: 0
LOSS OF SENSATION IN FEET: 0
OCCASIONAL FEELINGS OF UNSTEADINESS: 1

## 2024-04-22 ASSESSMENT — PAIN SCALES - GENERAL: PAINLEVEL: 0-NO PAIN

## 2024-04-22 NOTE — PROGRESS NOTES
Subjective   Patient ID: Sravan Christiansen is a 89 y.o. male who presents for Cerumen Impaction (FUV- Patient presents for ear wax cleaning.).    HPI  Patient here for ear cleaning.  Last visit was 12/5/2022. He feels his ears are feeling good. He wears bilateral hearing aids. Denies any ear pain.     Patient Active Problem List   Diagnosis    Coronary artery disease    Dyshidrosis    Essential hypertension    Femoral hernia    Gait instability    Generalized osteoarthritis of multiple sites    Herpes simplex    Hordeolum externum of right lower eyelid    Hyperhidrosis    Hyperlipidemia, mixed    Muscle strain of left lower leg    Presence of stent in coronary artery    Sebaceous cyst    Shoulder pain    Situational anxiety    Tinnitus    TMJ arthritis    URI, acute    Weakness of both lower extremities    Chronic combined systolic and diastolic HF (heart failure), NYHA class 1 (Multi)    Hearing loss, bilateral    Multinodular thyroid    Multiple contusions    Palpitations    Scalp laceration, sequela    Thyromegaly    New onset atrial fibrillation (Multi)    Hematoma of IV site (CMS-HCC)    Hypoxia    Multifocal pneumonia    Carcinoma of prostate (Multi)    Fracture, Colles, left, closed    Pulmonary nodule    S/P TAVR (transcatheter aortic valve replacement)    Benign lipomatous neoplasm    Rotator cuff arthropathy of both shoulders    Thoracic aortic aneurysm, without rupture, unspecified (CMS-HCC)    Chronic fatigue     Past Surgical History:   Procedure Laterality Date    ANKLE ARTHROSCOPY W/ OPEN REPAIR  02/18/2016    Ankle Repair    CATARACT EXTRACTION  06/07/2018    Cataract Surgery    COLONOSCOPY  04/13/2018    Colonoscopy    CORONARY ANGIOPLASTY WITH STENT PLACEMENT  02/18/2016    Cath Stent Placement    HERNIA REPAIR  02/18/2016    Hernia Repair    PROSTATE SURGERY  04/13/2018    Prostate Surgery     Review of Systems    All other systems have been reviewed and are negative for complaints except for  those mentioned in history of present illness, past medical history and problem list.    Objective   Physical Exam    Constitutional: No fever, chills, weight loss or weight gain  General appearance: Appears well, well-nourished, well groomed. No acute distress.    Communication: Normal communication    Psychiatric: Oriented to person, place and time. Normal mood and affect.    Neurologic: Cranial nerves II-XII grossly intact and symmetric bilaterally.    Head and Face:  Head: Atraumatic with no masses, lesions or scarring.  Face: Normal symmetry. No scars or deformities.  TMJ: Normal, no trismus.    Eyes: Conjunctiva not edematous or erythematous.     Right Ear: External inspection of ear with no deformity, scars, or masses. EAC is clear.  TM is intact with no sign of infection, effusion, or retraction.  No perforation seen.     Left Ear: External inspection of ear with no deformity, scars, or masses. EAC is clear.  TM is intact with no sign of infection, effusion, or retraction.  No perforation seen.     Nose: External inspection of nose: No nasal lesions, lacerations or scars. Anterior rhinoscopy with limited visualization past the inferior turbinates. No tenderness on frontal or maxillary sinus palpation.    Oral Cavity/Mouth: Oral cavity and oropharynx mucosa moist and pink. No lesions or masses. Dentition normal. Tonsils appear normal. Uvula is midline. Tongue with no masses or lesions. Tongue with good mobility. The oropharynx is clear.    Neck: Normal appearing, symmetric, trachea midline.     Cardiovascular: Examination of peripheral vascular system shows no clubbing or cyanosis.    Respiratory: No respiratory distress increased work of breathing. Inspection of the chest with symmetric chest expansion and normal respiratory effort.    Skin: No head and neck rashes.    Lymph nodes: No adenopathy.     Assessment/Plan   Diagnoses and all orders for this visit:  Bilateral hearing loss, unspecified hearing loss  type  Reassurance given that there is no cerumen impaction on exam today. He may follow up in 1 year, or as needed.  All questions answered to patient satisfaction.       DAMIAN Sheth-CNP 04/22/24 11:39 AM

## 2024-04-23 RX ORDER — LORAZEPAM 0.5 MG/1
0.5 TABLET ORAL DAILY PRN
Qty: 90 TABLET | Refills: 0 | Status: SHIPPED | OUTPATIENT
Start: 2024-04-23

## 2024-04-24 RX ORDER — DEXAMETHASONE SODIUM PHOSPHATE 4 MG/ML
4 INJECTION, SOLUTION INTRA-ARTICULAR; INTRALESIONAL; INTRAMUSCULAR; INTRAVENOUS; SOFT TISSUE
COMMUNITY
Start: 2024-01-22 | End: 2024-05-06 | Stop reason: ALTCHOICE

## 2024-04-24 RX ORDER — LIDOCAINE HYDROCHLORIDE 10 MG/ML
10 INJECTION INFILTRATION; PERINEURAL
COMMUNITY
Start: 2024-01-22 | End: 2024-05-06 | Stop reason: ALTCHOICE

## 2024-04-26 ENCOUNTER — APPOINTMENT (OUTPATIENT)
Dept: PRIMARY CARE | Facility: CLINIC | Age: 89
End: 2024-04-26
Payer: MEDICARE

## 2024-04-29 ENCOUNTER — TELEPHONE (OUTPATIENT)
Dept: CARDIOLOGY | Facility: CLINIC | Age: 89
End: 2024-04-29
Payer: MEDICARE

## 2024-04-29 DIAGNOSIS — I50.42 CHRONIC COMBINED SYSTOLIC AND DIASTOLIC HF (HEART FAILURE), NYHA CLASS 1 (MULTI): Primary | ICD-10-CM

## 2024-04-29 NOTE — TELEPHONE ENCOUNTER
Pt returned call and states he spoke with his insurance company regarding Farxiga. Per pt, Farxiga will be covered if it is sent in as the generic to [facility].    Pt states he is feeling good since Dr. Cardozo provided samples of Farxiga. Will pend new rx for Dapagliflozin 10mg daily to Dr. Cardozo.

## 2024-04-29 NOTE — TELEPHONE ENCOUNTER
PT wants Doctor to also know that the Farxiga samples he gave him are helping and he still has a week and a half left of the samples

## 2024-04-29 NOTE — TELEPHONE ENCOUNTER
PT is here wanting to speak to Dr. Cardozo's nurse about the medication Farxiga that the doctor has prescribed him---the side effects and some dangerous things in the literature from the Pharmacy. Also , would like to take the generic brand for the cost is much lower. Please call Patient to discuss.

## 2024-04-30 RX ORDER — DAPAGLIFLOZIN 10 MG/1
10 TABLET, FILM COATED ORAL DAILY
Qty: 90 TABLET | Refills: 3 | Status: SHIPPED | OUTPATIENT
Start: 2024-04-30 | End: 2025-04-30

## 2024-04-30 NOTE — TELEPHONE ENCOUNTER
Called and notified pt that rx was signed by Dr. Cardozo and sent to [facility] this morning. Pt verbalizes understanding.

## 2024-05-06 ENCOUNTER — OFFICE VISIT (OUTPATIENT)
Dept: PRIMARY CARE | Facility: CLINIC | Age: 89
End: 2024-05-06
Payer: MEDICARE

## 2024-05-06 ENCOUNTER — LAB (OUTPATIENT)
Dept: LAB | Facility: LAB | Age: 89
End: 2024-05-06
Payer: MEDICARE

## 2024-05-06 VITALS
WEIGHT: 123.4 LBS | OXYGEN SATURATION: 96 % | BODY MASS INDEX: 21.86 KG/M2 | SYSTOLIC BLOOD PRESSURE: 112 MMHG | TEMPERATURE: 97.7 F | DIASTOLIC BLOOD PRESSURE: 70 MMHG | HEIGHT: 63 IN | HEART RATE: 68 BPM

## 2024-05-06 DIAGNOSIS — I50.43 ACUTE ON CHRONIC COMBINED SYSTOLIC (CONGESTIVE) AND DIASTOLIC (CONGESTIVE) HEART FAILURE (MULTI): ICD-10-CM

## 2024-05-06 DIAGNOSIS — Z00.00 MEDICARE ANNUAL WELLNESS VISIT, SUBSEQUENT: Primary | ICD-10-CM

## 2024-05-06 DIAGNOSIS — I25.118 CORONARY ARTERY DISEASE OF NATIVE ARTERY OF NATIVE HEART WITH STABLE ANGINA PECTORIS (CMS-HCC): ICD-10-CM

## 2024-05-06 DIAGNOSIS — R64 CACHEXIA (MULTI): ICD-10-CM

## 2024-05-06 DIAGNOSIS — E04.2 MULTINODULAR THYROID: ICD-10-CM

## 2024-05-06 DIAGNOSIS — N18.31 STAGE 3A CHRONIC KIDNEY DISEASE (MULTI): ICD-10-CM

## 2024-05-06 PROBLEM — I48.91 NEW ONSET ATRIAL FIBRILLATION (MULTI): Status: RESOLVED | Noted: 2023-04-19 | Resolved: 2024-05-06

## 2024-05-06 LAB
CHOLEST SERPL-MCNC: 130 MG/DL (ref 0–199)
CHOLESTEROL/HDL RATIO: 2.3
HDLC SERPL-MCNC: 56.9 MG/DL
LDLC SERPL CALC-MCNC: 60 MG/DL
NON HDL CHOLESTEROL: 73 MG/DL (ref 0–149)
TRIGL SERPL-MCNC: 67 MG/DL (ref 0–149)
TSH SERPL-ACNC: 1.07 MIU/L (ref 0.44–3.98)
VLDL: 13 MG/DL (ref 0–40)

## 2024-05-06 PROCEDURE — 3074F SYST BP LT 130 MM HG: CPT | Performed by: FAMILY MEDICINE

## 2024-05-06 PROCEDURE — 1160F RVW MEDS BY RX/DR IN RCRD: CPT | Performed by: FAMILY MEDICINE

## 2024-05-06 PROCEDURE — 80061 LIPID PANEL: CPT

## 2024-05-06 PROCEDURE — 1159F MED LIST DOCD IN RCRD: CPT | Performed by: FAMILY MEDICINE

## 2024-05-06 PROCEDURE — 1036F TOBACCO NON-USER: CPT | Performed by: FAMILY MEDICINE

## 2024-05-06 PROCEDURE — 84443 ASSAY THYROID STIM HORMONE: CPT

## 2024-05-06 PROCEDURE — 99497 ADVNCD CARE PLAN 30 MIN: CPT | Performed by: FAMILY MEDICINE

## 2024-05-06 PROCEDURE — 36415 COLL VENOUS BLD VENIPUNCTURE: CPT

## 2024-05-06 PROCEDURE — 99397 PER PM REEVAL EST PAT 65+ YR: CPT | Performed by: FAMILY MEDICINE

## 2024-05-06 PROCEDURE — 1157F ADVNC CARE PLAN IN RCRD: CPT | Performed by: FAMILY MEDICINE

## 2024-05-06 PROCEDURE — 1170F FXNL STATUS ASSESSED: CPT | Performed by: FAMILY MEDICINE

## 2024-05-06 PROCEDURE — 1126F AMNT PAIN NOTED NONE PRSNT: CPT | Performed by: FAMILY MEDICINE

## 2024-05-06 PROCEDURE — G0439 PPPS, SUBSEQ VISIT: HCPCS | Performed by: FAMILY MEDICINE

## 2024-05-06 PROCEDURE — 3078F DIAST BP <80 MM HG: CPT | Performed by: FAMILY MEDICINE

## 2024-05-06 ASSESSMENT — GERIATRIC MINI NUTRITIONAL ASSESSMENT (MNA)
C GENERAL MOBILITY: GOES OUT
E NEUROPSYCHOLOGICAL PROBLEMS: NO PSYCHOLOGICAL PROBLEMS
A HAS FOOD INTAKE DECLINED OVER THE PAST 3 MONTHS DUE TO LOSS OF APPETITE, DIGESTIVE PROBLEMS, CHEWING OR SWALLOWING DIFFICULTIES?: NO DECREASE IN FOOD INTAKE
B WEIGHT LOSS DURING THE LAST 3 MONTHS: NO WEIGHT LOSS
C GENERAL MOBILITY: GOES OUT
A HAS FOOD INTAKE DECLINED OVER THE PAST 3 MONTHS DUE TO LOSS OF APPETITE, DIGESTIVE PROBLEMS, CHEWING OR SWALLOWING DIFFICULTIES?: NO DECREASE IN FOOD INTAKE
E NEUROPSYCHOLOGICAL PROBLEMS: NO PSYCHOLOGICAL PROBLEMS
C GENERAL MOBILITY: GOES OUT
D HAS SUFFERED PSYCHOLOGICAL STRESS OR ACUTE DISEASE IN THE PAST 3 MONTHS?: NO
B WEIGHT LOSS DURING THE LAST 3 MONTHS: NO WEIGHT LOSS
D HAS SUFFERED PSYCHOLOGICAL STRESS OR ACUTE DISEASE IN THE PAST 3 MONTHS?: NO
B WEIGHT LOSS DURING THE LAST 3 MONTHS: NO WEIGHT LOSS
D HAS SUFFERED PSYCHOLOGICAL STRESS OR ACUTE DISEASE IN THE PAST 3 MONTHS?: NO
E NEUROPSYCHOLOGICAL PROBLEMS: NO PSYCHOLOGICAL PROBLEMS

## 2024-05-06 ASSESSMENT — ACTIVITIES OF DAILY LIVING (ADL)
MANAGING FINANCES: INDEPENDENT
PREPARING MEALS: NEEDS ASSISTANCE
STIL DRIVING: NO
GROCERY SHOPPING: INDEPENDENT
USING TELEPHONE: INDEPENDENT
PATIENT'S MEMORY ADEQUATE TO SAFELY COMPLETE DAILY ACTIVITIES?: YES
TAKING MEDICATION: INDEPENDENT
EATING: INDEPENDENT
MANAGING FINANCES: INDEPENDENT
TAKING MEDICATION: INDEPENDENT
FEEDING: INDEPENDENT
PREPARING MEALS: INDEPENDENT
NEEDS ASSISTANCE WITH FOOD: INDEPENDENT
DOING HOUSEWORK: INDEPENDENT
TAKING MEDICATION: INDEPENDENT
STIL DRIVING: YES
DRESSING: INDEPENDENT
JUDGMENT_ADEQUATE_SAFELY_COMPLETE_DAILY_ACTIVITIES: YES
WALKS IN HOME: INDEPENDENT
STIL DRIVING: YES
BATHING: INDEPENDENT
EATING: INDEPENDENT
USING TRANSPORTATION: INDEPENDENT
TOILETING: INDEPENDENT
DOING HOUSEWORK: NEEDS ASSISTANCE
DRESSING YOURSELF: INDEPENDENT
USING TRANSPORTATION: TOTAL CARE
ADEQUATE_TO_COMPLETE_ADL: YES
FEEDING YOURSELF: INDEPENDENT
JUDGMENT_ADEQUATE_SAFELY_COMPLETE_DAILY_ACTIVITIES: YES
ADEQUATE_TO_COMPLETE_ADL: YES
NEEDS ASSISTANCE WITH FOOD: INDEPENDENT
NEEDS ASSISTANCE WITH FOOD: INDEPENDENT
BATHING: INDEPENDENT
PREPARING MEALS: INDEPENDENT
USING TRANSPORTATION: INDEPENDENT
GROCERY SHOPPING: TOTAL CARE
USING TELEPHONE: INDEPENDENT
USING TELEPHONE: INDEPENDENT
DOING HOUSEWORK: INDEPENDENT
GROCERY SHOPPING: INDEPENDENT
MANAGING FINANCES: INDEPENDENT
TOILETING: INDEPENDENT
GROOMING: INDEPENDENT
EATING: INDEPENDENT

## 2024-05-06 ASSESSMENT — ENCOUNTER SYMPTOMS
FATIGUE: 1
ARTHRALGIAS: 1
ENDOCRINE NEGATIVE: 1
DEPRESSION: 0
GASTROINTESTINAL NEGATIVE: 1
OCCASIONAL FEELINGS OF UNSTEADINESS: 0
LOSS OF SENSATION IN FEET: 0
RESPIRATORY NEGATIVE: 1
PSYCHIATRIC NEGATIVE: 1
CARDIOVASCULAR NEGATIVE: 1

## 2024-05-06 ASSESSMENT — ANXIETY QUESTIONNAIRES
6. BECOMING EASILY ANNOYED OR IRRITABLE: NOT AT ALL
GAD7 TOTAL SCORE: 0
1. FEELING NERVOUS, ANXIOUS, OR ON EDGE: NOT AT ALL
4. TROUBLE RELAXING: NOT AT ALL
5. BEING SO RESTLESS THAT IT IS HARD TO SIT STILL: NOT AT ALL
7. FEELING AFRAID AS IF SOMETHING AWFUL MIGHT HAPPEN: NOT AT ALL
7. FEELING AFRAID AS IF SOMETHING AWFUL MIGHT HAPPEN: NOT AT ALL
1. FEELING NERVOUS, ANXIOUS, OR ON EDGE: NOT AT ALL
2. NOT BEING ABLE TO STOP OR CONTROL WORRYING: NOT AT ALL
6. BECOMING EASILY ANNOYED OR IRRITABLE: NOT AT ALL
GAD7 TOTAL SCORE: 0
3. WORRYING TOO MUCH ABOUT DIFFERENT THINGS: NOT AT ALL
3. WORRYING TOO MUCH ABOUT DIFFERENT THINGS: NOT AT ALL
5. BEING SO RESTLESS THAT IT IS HARD TO SIT STILL: NOT AT ALL
2. NOT BEING ABLE TO STOP OR CONTROL WORRYING: NOT AT ALL
4. TROUBLE RELAXING: NOT AT ALL

## 2024-05-06 ASSESSMENT — PAIN SCALES - GENERAL: PAINLEVEL: 0-NO PAIN

## 2024-05-06 NOTE — ACP (ADVANCE CARE PLANNING)
Confirming Previous Code Status:   Advance Care Planning Note     Discussion Date: 05/06/24   Discussion Participants: patient    The patient wishes to discuss Advance Care Planning today and the following is a brief summary of our discussion.     Patient has capacity to make their own medical decisions: Yes  Health Care Agent/Surrogate Decision Maker documented in chart: Yes    Documents on file and valid:  Advance Directive/Living Will: Yes   Health Care Power of : Yes  Other: none    Communication of Medical Status/Prognosis:   yes     Communication of Treatment Goals/Options:   yes     Treatment Decisions  Goals of Care: survival is paramount regardless of prognosis, treatment outcome, or burden   yes  Follow Up Plan  no  Team Members  myself  Time Statement: Total face to face time spent on advance care planning was 16 minutes with 16 minutes spent in counseling, including the explanation.    Gerardo Marie DO  5/6/2024 9:49 AM

## 2024-05-06 NOTE — PROGRESS NOTES
"Subjective   Patient ID: Sravan Christiansen is a 89 y.o. male who presents for Annual Exam (Annual medicare wellness not fasting).    HPI     Review of Systems   Constitutional:  Positive for fatigue.   HENT: Negative.     Respiratory: Negative.     Cardiovascular: Negative.         Dr Cardozo   Gastrointestinal: Negative.    Endocrine: Negative.    Musculoskeletal:  Positive for arthralgias.   Psychiatric/Behavioral: Negative.         Objective   /70 (BP Location: Left arm)   Pulse 68   Temp 36.5 °C (97.7 °F) (Temporal)   Ht 1.6 m (5' 3\")   Wt 56 kg (123 lb 6.4 oz)   SpO2 96%   BMI 21.86 kg/m²     Physical Exam  Vitals and nursing note reviewed.   Constitutional:       Appearance: Normal appearance.   HENT:      Right Ear: Tympanic membrane normal.      Left Ear: Tympanic membrane normal.      Ears:      Comments: Bilateral hearing aids     Mouth/Throat:      Pharynx: Oropharynx is clear.   Cardiovascular:      Rate and Rhythm: Normal rate and regular rhythm.      Heart sounds: Murmur heard.      Systolic murmur is present with a grade of 2/6.   Pulmonary:      Breath sounds: Normal breath sounds.   Abdominal:      Palpations: Abdomen is soft.   Musculoskeletal:         General: Normal range of motion.   Neurological:      General: No focal deficit present.      Mental Status: He is alert and oriented to person, place, and time.   Psychiatric:         Mood and Affect: Mood normal.         Behavior: Behavior normal.         Assessment/Plan patient seen here for an annual Medicare wellness exam.  We reviewed his questionnaire he is agreeable to his responses.  We did discuss advanced directives.  He has no significant difficulty with depression or anxiety.  He continues to follow with cardiology we are getting some lab work here today.  Will see him back as needed and in 1 year for his physical  Problem List Items Addressed This Visit             ICD-10-CM    Coronary artery disease of native artery of " native heart with stable angina pectoris (CMS-HCC) I25.118    Relevant Orders    Lipid Panel    Acute on chronic combined systolic (congestive) and diastolic (congestive) heart failure (Multi) I50.43    Multinodular thyroid E04.2    Relevant Orders    TSH with reflex to Free T4 if abnormal    Cachexia ([facility]) R64    Stage 3a chronic kidney disease (Multi) N18.31     Other Visit Diagnoses         Codes    Medicare annual wellness visit, subsequent    -  Primary Z00.00    BMI 21.0-21.9, adult     Z68.21

## 2024-05-07 ENCOUNTER — TELEPHONE (OUTPATIENT)
Dept: CARDIOLOGY | Facility: CLINIC | Age: 89
End: 2024-05-07
Payer: MEDICARE

## 2024-05-07 DIAGNOSIS — I50.42 CHRONIC COMBINED SYSTOLIC AND DIASTOLIC HF (HEART FAILURE), NYHA CLASS 1 (MULTI): ICD-10-CM

## 2024-05-07 NOTE — TELEPHONE ENCOUNTER
Pt waiting for [facility] order. Samples at . Pt aware.  Farxiga 10mg  LOT: [id number]  EXP: 10/31/26

## 2024-05-07 NOTE — TELEPHONE ENCOUNTER
Patient phoning and states that [facility] told him that he will not receive his supply of Farxiga for  at least 1 more week.  Possible to dispense samples or phone 1 week to pharmacy?  Please advise.

## 2024-05-14 ENCOUNTER — TELEPHONE (OUTPATIENT)
Dept: PRIMARY CARE | Facility: CLINIC | Age: 89
End: 2024-05-14
Payer: MEDICARE

## 2024-05-16 ENCOUNTER — TELEPHONE (OUTPATIENT)
Dept: PRIMARY CARE | Facility: CLINIC | Age: 89
End: 2024-05-16
Payer: MEDICARE

## 2024-05-16 NOTE — TELEPHONE ENCOUNTER
Sravan called he has swelling on his face /right cheek near his ear.    He is looking to schedule for a 10-11 time slow but those are booked up until July/August.    Is it ok to squeeze him in later in the month for a 10/11 slot?

## 2024-05-31 ENCOUNTER — APPOINTMENT (OUTPATIENT)
Dept: PRIMARY CARE | Facility: CLINIC | Age: 89
End: 2024-05-31
Payer: MEDICARE

## 2024-05-31 ENCOUNTER — HOSPITAL ENCOUNTER (OUTPATIENT)
Dept: RADIOLOGY | Facility: CLINIC | Age: 89
Discharge: HOME | End: 2024-05-31
Payer: MEDICARE

## 2024-05-31 DIAGNOSIS — M79.641 RIGHT HAND PAIN: ICD-10-CM

## 2024-05-31 PROCEDURE — 73130 X-RAY EXAM OF HAND: CPT | Mod: RIGHT SIDE | Performed by: STUDENT IN AN ORGANIZED HEALTH CARE EDUCATION/TRAINING PROGRAM

## 2024-05-31 PROCEDURE — 73130 X-RAY EXAM OF HAND: CPT | Mod: RT

## 2024-06-11 PROBLEM — R06.00 DYSPNEA: Status: ACTIVE | Noted: 2024-06-11

## 2024-06-25 ENCOUNTER — APPOINTMENT (OUTPATIENT)
Dept: CARDIOLOGY | Facility: CLINIC | Age: 89
End: 2024-06-25
Payer: MEDICARE

## 2024-06-25 VITALS
SYSTOLIC BLOOD PRESSURE: 100 MMHG | OXYGEN SATURATION: 96 % | WEIGHT: 120 LBS | BODY MASS INDEX: 21.26 KG/M2 | HEIGHT: 63 IN | DIASTOLIC BLOOD PRESSURE: 70 MMHG | HEART RATE: 74 BPM

## 2024-06-25 DIAGNOSIS — I50.43 ACUTE ON CHRONIC COMBINED SYSTOLIC (CONGESTIVE) AND DIASTOLIC (CONGESTIVE) HEART FAILURE (MULTI): Primary | ICD-10-CM

## 2024-06-25 DIAGNOSIS — Z95.5 PRESENCE OF STENT IN CORONARY ARTERY: ICD-10-CM

## 2024-06-25 DIAGNOSIS — Z95.2 S/P TAVR (TRANSCATHETER AORTIC VALVE REPLACEMENT): ICD-10-CM

## 2024-06-25 DIAGNOSIS — I25.118 CORONARY ARTERY DISEASE OF NATIVE ARTERY OF NATIVE HEART WITH STABLE ANGINA PECTORIS (CMS-HCC): ICD-10-CM

## 2024-06-25 DIAGNOSIS — E78.2 HYPERLIPIDEMIA, MIXED: ICD-10-CM

## 2024-06-25 DIAGNOSIS — I10 ESSENTIAL HYPERTENSION: ICD-10-CM

## 2024-06-25 DIAGNOSIS — R00.2 PALPITATIONS: ICD-10-CM

## 2024-06-25 DIAGNOSIS — I71.21 ANEURYSM OF ASCENDING AORTA WITHOUT RUPTURE (CMS-HCC): ICD-10-CM

## 2024-06-25 PROCEDURE — 1159F MED LIST DOCD IN RCRD: CPT | Performed by: INTERNAL MEDICINE

## 2024-06-25 PROCEDURE — 3074F SYST BP LT 130 MM HG: CPT | Performed by: INTERNAL MEDICINE

## 2024-06-25 PROCEDURE — 1157F ADVNC CARE PLAN IN RCRD: CPT | Performed by: INTERNAL MEDICINE

## 2024-06-25 PROCEDURE — 99214 OFFICE O/P EST MOD 30 MIN: CPT | Performed by: INTERNAL MEDICINE

## 2024-06-25 PROCEDURE — 3078F DIAST BP <80 MM HG: CPT | Performed by: INTERNAL MEDICINE

## 2024-06-25 PROCEDURE — 1036F TOBACCO NON-USER: CPT | Performed by: INTERNAL MEDICINE

## 2024-06-25 NOTE — PATIENT INSTRUCTIONS
We feel that you are significantly improved.    Keep up the walking program at [employer].    In the future we will follow-up with an ultrasound test on your heart

## 2024-06-25 NOTE — PROGRESS NOTES
Subjective   Sravan Christiansen is a 89 y.o. male.    Chief Complaint:  Follow-up transaortic valve replacement.    HPI    He has significantly improved since his last visit.  He still lives on his own.  He is independent.  He goes to [employer] and walks on treadmill.  He walks for 1 mile.  Does not get any symptoms walking.  Feels good when he is walking on the treadmill.  He does this 2 to 3 days/week.  His shortness of breath has significantly improved.  He does fatigue.    The patient is status post transaortic valve replacement.  This was done on 2023.  A 34 mm valve was placed.     Cardiac catheterization performed on 2023 demonstrated mild coronary artery disease. Right heart catheterization demonstrated elevated pulmonary artery pressures and mildly elevated wedge pressure of 21.     The patient presented on 2015 with symptoms of chest discomfort and chest pressure. A stress thallium study was markedly abnormal showing a large area of anteroapical and inferoapical defect with an estimated ejection fraction of 41%. Cardiac catheterization was performed on 2015 which demonstrated abnormal left ventricular function with an estimated ejection fraction of 45%. The anterior descending had a 90% mid lesion treated with balloon angioplasty and stenting using a Promus premier drug-eluting stent. The circumflex had mild disease. The right coronary artery had mild disease.     The medical problems including history of bradycardia, hypertension, hyperlipidemia, and a past smoking history.     His wife  a few years ago.      Allergies  Medication    · Penicillins  Recorded By: Judi Landa; 2016 11:22:51 AM     Family History  Mother    · Family history of cerebral aneurysm (V17.1) (Z82.49)  Brother    · Family history of cardiac disorder (V17.49) (Z82.49)     Social History  Problems    · Active advance directive (V49.89) (Z78.9)   · Consumes alcohol occasionally  (V49.89) (Z78.9)   · Former smoker (V15.82) (Z87.891)   · QUIT IN MID 20'S     Review of Systems   Constitutional: Positive for malaise/fatigue.   Cardiovascular:  Positive for dyspnea on exertion.   Musculoskeletal:  Positive for arthritis.   All other systems reviewed and are negative.      Current Outpatient Medications   Medication Sig Dispense Refill    acetaminophen (Tylenol) 325 mg tablet Take by mouth every 8 hours.      aspirin 81 mg EC tablet Take 1 tablet (81 mg) by mouth once daily.      dapagliflozin propanediol (Farxiga) 10 mg Take 1 tablet (10 mg) by mouth once daily. 90 tablet 3    docusate sodium (Colace) 100 mg capsule Take by mouth every 12 hours.      fexofenadine (Allegra) 180 mg tablet Take by mouth.      furosemide (Lasix) 20 mg tablet Take 1 tablet (20 mg) by mouth once daily. 90 tablet 3    hydrocortisone 2.5 % cream Apply topically if needed.      lisinopril 10 mg tablet Take 1 tablet (10 mg) by mouth once daily. 90 tablet 3    LORazepam (Ativan) 0.5 mg tablet Take 1 tablet (0.5 mg) by mouth once daily as needed for anxiety. 90 tablet 0    metoprolol succinate XL (Toprol-XL) 25 mg 24 hr tablet Take 0.5 tablets (12.5 mg) by mouth once daily. 45 tablet 3    rosuvastatin (Crestor) 5 mg tablet Take 1 tablet (5 mg) by mouth once daily. 90 tablet 3     No current facility-administered medications for this visit.        Visit Vitals  Smoking Status Never        Objective     Constitutional:       Appearance: Not in distress.   Neck:      Vascular: JVD normal.   Pulmonary:      Breath sounds: Normal breath sounds.   Cardiovascular:      Normal rate. Regular rhythm. S1 with normal intensity. S2 with normal intensity.       Murmurs: There is a grade 2/6 systolic murmur.      No gallop.    Pulses:     Intact distal pulses.   Edema:     Peripheral edema absent.   Abdominal:      General: Bowel sounds are normal.   Neurological:      Mental Status: Alert and oriented to person, place and time.          Lab Review:   Lab Results   Component Value Date     04/19/2024    K 4.1 04/19/2024     04/19/2024    CO2 29 04/19/2024    BUN 29 (H) 04/19/2024    CREATININE 1.40 (H) 04/19/2024    GLUCOSE 85 04/19/2024    CALCIUM 9.0 04/19/2024     Lab Results   Component Value Date    CHOL 130 05/06/2024    TRIG 67 05/06/2024    HDL 56.9 05/06/2024       Assessment:    1.  Status post transaortic valve replacement.  By exam his valve is functioning normally.  He is dramatically improved with the ability to walk 1 mile on a treadmill.    2.  Systolic congestive heart failure.  Last echo study showed only minimal improvement and left ventricular function.  However clinically he is much better.  We are hoping that this also translates into improvement in left ventricular function.  We did start him on Farxiga on his last visit.    3.  Coronary artery disease.  Mild coronary disease on the basis of cardiac catheterization.    4.  Mild renal insufficiency.  He has latest labs show a potassium of 4.1, BUN 29, creatinine 1.4.

## 2024-07-22 ENCOUNTER — LAB (OUTPATIENT)
Dept: LAB | Facility: LAB | Age: 89
End: 2024-07-22
Payer: MEDICARE

## 2024-07-22 DIAGNOSIS — I50.43 ACUTE ON CHRONIC COMBINED SYSTOLIC (CONGESTIVE) AND DIASTOLIC (CONGESTIVE) HEART FAILURE (MULTI): ICD-10-CM

## 2024-07-22 DIAGNOSIS — R00.2 PALPITATIONS: ICD-10-CM

## 2024-07-22 DIAGNOSIS — I10 ESSENTIAL HYPERTENSION: ICD-10-CM

## 2024-07-22 DIAGNOSIS — I25.118 CORONARY ARTERY DISEASE OF NATIVE ARTERY OF NATIVE HEART WITH STABLE ANGINA PECTORIS (CMS-HCC): ICD-10-CM

## 2024-07-22 LAB
ANION GAP SERPL CALC-SCNC: 13 MMOL/L (ref 10–20)
BUN SERPL-MCNC: 33 MG/DL (ref 6–23)
CALCIUM SERPL-MCNC: 9 MG/DL (ref 8.6–10.6)
CHLORIDE SERPL-SCNC: 106 MMOL/L (ref 98–107)
CO2 SERPL-SCNC: 27 MMOL/L (ref 21–32)
CREAT SERPL-MCNC: 1.53 MG/DL (ref 0.5–1.3)
EGFRCR SERPLBLD CKD-EPI 2021: 42 ML/MIN/1.73M*2
ERYTHROCYTE [DISTWIDTH] IN BLOOD BY AUTOMATED COUNT: 13.7 % (ref 11.5–14.5)
GLUCOSE SERPL-MCNC: 105 MG/DL (ref 74–99)
HCT VFR BLD AUTO: 40 % (ref 41–52)
HGB BLD-MCNC: 12.5 G/DL (ref 13.5–17.5)
MCH RBC QN AUTO: 31.3 PG (ref 26–34)
MCHC RBC AUTO-ENTMCNC: 31.3 G/DL (ref 32–36)
MCV RBC AUTO: 100 FL (ref 80–100)
NRBC BLD-RTO: 0 /100 WBCS (ref 0–0)
PLATELET # BLD AUTO: 147 X10*3/UL (ref 150–450)
POTASSIUM SERPL-SCNC: 4.6 MMOL/L (ref 3.5–5.3)
RBC # BLD AUTO: 4 X10*6/UL (ref 4.5–5.9)
SODIUM SERPL-SCNC: 141 MMOL/L (ref 136–145)
WBC # BLD AUTO: 5.8 X10*3/UL (ref 4.4–11.3)

## 2024-07-22 PROCEDURE — 83880 ASSAY OF NATRIURETIC PEPTIDE: CPT

## 2024-07-22 PROCEDURE — 36415 COLL VENOUS BLD VENIPUNCTURE: CPT

## 2024-07-22 PROCEDURE — 80048 BASIC METABOLIC PNL TOTAL CA: CPT

## 2024-07-22 PROCEDURE — 85027 COMPLETE CBC AUTOMATED: CPT

## 2024-07-23 LAB — BNP SERPL-MCNC: 1253 PG/ML (ref 0–99)

## 2024-07-26 ENCOUNTER — APPOINTMENT (OUTPATIENT)
Dept: PRIMARY CARE | Facility: CLINIC | Age: 89
End: 2024-07-26
Payer: MEDICARE

## 2024-07-26 ENCOUNTER — LAB (OUTPATIENT)
Dept: LAB | Facility: LAB | Age: 89
End: 2024-07-26
Payer: MEDICARE

## 2024-07-26 VITALS
WEIGHT: 122.2 LBS | OXYGEN SATURATION: 98 % | TEMPERATURE: 97.7 F | BODY MASS INDEX: 21.65 KG/M2 | SYSTOLIC BLOOD PRESSURE: 132 MMHG | HEIGHT: 63 IN | HEART RATE: 67 BPM | DIASTOLIC BLOOD PRESSURE: 70 MMHG

## 2024-07-26 DIAGNOSIS — Z51.81 MEDICATION MONITORING ENCOUNTER: ICD-10-CM

## 2024-07-26 DIAGNOSIS — F41.8 SITUATIONAL ANXIETY: ICD-10-CM

## 2024-07-26 DIAGNOSIS — G47.00 INSOMNIA, UNSPECIFIED TYPE: Primary | ICD-10-CM

## 2024-07-26 DIAGNOSIS — C61 CARCINOMA OF PROSTATE (MULTI): ICD-10-CM

## 2024-07-26 DIAGNOSIS — Z95.2 S/P TAVR (TRANSCATHETER AORTIC VALVE REPLACEMENT): ICD-10-CM

## 2024-07-26 LAB
AMPHETAMINES UR QL SCN: NORMAL
BARBITURATES UR QL SCN: NORMAL
BENZODIAZ UR QL SCN: NORMAL
BZE UR QL SCN: NORMAL
CANNABINOIDS UR QL SCN: NORMAL
FENTANYL+NORFENTANYL UR QL SCN: NORMAL
METHADONE UR QL SCN: NORMAL
OPIATES UR QL SCN: NORMAL
OXYCODONE+OXYMORPHONE UR QL SCN: NORMAL
PCP UR QL SCN: NORMAL

## 2024-07-26 PROCEDURE — 1159F MED LIST DOCD IN RCRD: CPT | Performed by: FAMILY MEDICINE

## 2024-07-26 PROCEDURE — 1036F TOBACCO NON-USER: CPT | Performed by: FAMILY MEDICINE

## 2024-07-26 PROCEDURE — 3075F SYST BP GE 130 - 139MM HG: CPT | Performed by: FAMILY MEDICINE

## 2024-07-26 PROCEDURE — 3078F DIAST BP <80 MM HG: CPT | Performed by: FAMILY MEDICINE

## 2024-07-26 PROCEDURE — 99213 OFFICE O/P EST LOW 20 MIN: CPT | Performed by: FAMILY MEDICINE

## 2024-07-26 PROCEDURE — 1126F AMNT PAIN NOTED NONE PRSNT: CPT | Performed by: FAMILY MEDICINE

## 2024-07-26 PROCEDURE — 80307 DRUG TEST PRSMV CHEM ANLYZR: CPT

## 2024-07-26 PROCEDURE — 1160F RVW MEDS BY RX/DR IN RCRD: CPT | Performed by: FAMILY MEDICINE

## 2024-07-26 PROCEDURE — 1157F ADVNC CARE PLAN IN RCRD: CPT | Performed by: FAMILY MEDICINE

## 2024-07-26 RX ORDER — LORAZEPAM 0.5 MG/1
0.5 TABLET ORAL DAILY PRN
Qty: 90 TABLET | Refills: 0 | Status: SHIPPED | OUTPATIENT
Start: 2024-07-26

## 2024-07-26 ASSESSMENT — ENCOUNTER SYMPTOMS
ARTHRALGIAS: 1
CARDIOVASCULAR NEGATIVE: 1
SLEEP DISTURBANCE: 1
CONSTITUTIONAL NEGATIVE: 1

## 2024-07-26 ASSESSMENT — PAIN SCALES - GENERAL: PAINLEVEL: 0-NO PAIN

## 2024-07-26 NOTE — PROGRESS NOTES
"Subjective   Patient ID: Sravan Christiansen is a 89 y.o. male who presents for Follow-up (Med refills).   3 month med refills  HPI     Review of Systems   Constitutional: Negative.    Cardiovascular: Negative.    Musculoskeletal:  Positive for arthralgias.   Psychiatric/Behavioral:  Positive for sleep disturbance.        Objective   /70 (BP Location: Left arm)   Pulse 67   Temp 36.5 °C (97.7 °F) (Temporal)   Ht 1.6 m (5' 3\")   Wt 55.4 kg (122 lb 3.2 oz)   SpO2 98%   BMI 21.65 kg/m²     Physical Exam  Vitals and nursing note reviewed.   Constitutional:       Appearance: Normal appearance.   Cardiovascular:      Rate and Rhythm: Normal rate and regular rhythm.      Heart sounds: Normal heart sounds.   Pulmonary:      Breath sounds: Normal breath sounds.   Neurological:      General: No focal deficit present.      Mental Status: He is alert and oriented to person, place, and time.   Psychiatric:         Mood and Affect: Mood normal.         Behavior: Behavior normal.         Assessment/Plan patient seen here for follow-up on his lorazepam which is helpful for him for sleep.  We refilled that medication.  We reviewed his consult with cardiology.  Will see him back in 3 months  Problem List Items Addressed This Visit             ICD-10-CM    Situational anxiety F41.8    Relevant Medications    LORazepam (Ativan) 0.5 mg tablet    Other Relevant Orders    Drug Screen, Urine With Reflex to Confirmation    Carcinoma of prostate (Multi) C61    S/P TAVR (transcatheter aortic valve replacement) Z95.2     Other Visit Diagnoses         Codes    Insomnia, unspecified type    -  Primary G47.00    Medication monitoring encounter     Z51.81    Relevant Orders    Drug Screen, Urine With Reflex to Confirmation             OAR[place]:  Gerardo Marie DO on 7/26/2024 12:44 PM  I have personally reviewed the OARRS report for Sravan Christiansen. I have considered the risks of abuse, dependence, addiction and diversion    Is the " patient prescribed a combination of a benzodiazepine and opioid?  No    Last Urine Drug Screen / ordered today: Yes  No results found for this or any previous visit (from the past 8760 hour(s)).  N/A    Controlled Substance Agreement:  Date of the Last Agreement: 7/26/24  Reviewed Controlled Substance Agreement including but not limited to the benefits, risks, and alternatives to treatment with a Controlled Substance medication(s).    Benzodiazepines:  What is the patient's goal of therapy? sleep  Is this being achieved with current treatment? yes    BRITTANI-7:  No data recorded    Activities of Daily Living:   Is your overall impression that this patient is benefiting (symptom reduction outweighs side effects) from benzodiazepine therapy? Yes     1. Physical Functioning: Better  2. Family Relationship: Better  3. Social Relationship: Better  4. Mood: Better  5. Sleep Patterns: Better  6. Overall Function: Better

## 2024-07-29 PROBLEM — M79.641 PAIN IN RIGHT HAND: Status: ACTIVE | Noted: 2024-05-31

## 2024-08-16 ENCOUNTER — APPOINTMENT (OUTPATIENT)
Dept: CARDIOLOGY | Facility: CLINIC | Age: 89
End: 2024-08-16
Payer: MEDICARE

## 2024-08-16 VITALS
WEIGHT: 121 LBS | OXYGEN SATURATION: 98 % | BODY MASS INDEX: 21.44 KG/M2 | DIASTOLIC BLOOD PRESSURE: 60 MMHG | HEIGHT: 63 IN | SYSTOLIC BLOOD PRESSURE: 96 MMHG | HEART RATE: 65 BPM

## 2024-08-16 DIAGNOSIS — I25.118 CORONARY ARTERY DISEASE OF NATIVE ARTERY OF NATIVE HEART WITH STABLE ANGINA PECTORIS (CMS-HCC): Primary | ICD-10-CM

## 2024-08-16 DIAGNOSIS — R00.2 PALPITATIONS: ICD-10-CM

## 2024-08-16 DIAGNOSIS — I10 ESSENTIAL HYPERTENSION: ICD-10-CM

## 2024-08-16 DIAGNOSIS — Z95.5 PRESENCE OF STENT IN CORONARY ARTERY: ICD-10-CM

## 2024-08-16 DIAGNOSIS — Z95.2 S/P TAVR (TRANSCATHETER AORTIC VALVE REPLACEMENT): ICD-10-CM

## 2024-08-16 DIAGNOSIS — I71.21 ANEURYSM OF ASCENDING AORTA WITHOUT RUPTURE (CMS-HCC): ICD-10-CM

## 2024-08-16 DIAGNOSIS — E78.2 HYPERLIPIDEMIA, MIXED: ICD-10-CM

## 2024-08-16 DIAGNOSIS — I50.43 ACUTE ON CHRONIC COMBINED SYSTOLIC (CONGESTIVE) AND DIASTOLIC (CONGESTIVE) HEART FAILURE (MULTI): ICD-10-CM

## 2024-08-16 PROCEDURE — 1159F MED LIST DOCD IN RCRD: CPT | Performed by: INTERNAL MEDICINE

## 2024-08-16 PROCEDURE — 93005 ELECTROCARDIOGRAM TRACING: CPT | Performed by: INTERNAL MEDICINE

## 2024-08-16 PROCEDURE — 3074F SYST BP LT 130 MM HG: CPT | Performed by: INTERNAL MEDICINE

## 2024-08-16 PROCEDURE — 93010 ELECTROCARDIOGRAM REPORT: CPT | Performed by: INTERNAL MEDICINE

## 2024-08-16 PROCEDURE — 99214 OFFICE O/P EST MOD 30 MIN: CPT | Performed by: INTERNAL MEDICINE

## 2024-08-16 PROCEDURE — 1157F ADVNC CARE PLAN IN RCRD: CPT | Performed by: INTERNAL MEDICINE

## 2024-08-16 PROCEDURE — 1036F TOBACCO NON-USER: CPT | Performed by: INTERNAL MEDICINE

## 2024-08-16 PROCEDURE — 3078F DIAST BP <80 MM HG: CPT | Performed by: INTERNAL MEDICINE

## 2024-08-16 NOTE — PATIENT INSTRUCTIONS
No changes in medications.    We feel that you do not need the prednisone at this time.    We will see you back in 3 months with an echocardiogram.

## 2024-08-16 NOTE — PROGRESS NOTES
Subjective   Sravan Christiansen is a 89 y.o. male.    Chief Complaint:  Fatigue and exertional dyspnea    HPI    His complaints are that of generalized fatigue and generalized weakness.  He does not have much in the way of shortness of breath although activity levels are somewhat limited.  He notes shortness of breath with increased activities.  Continues to exercise and walk on a treadmill.  Generally feels very really good when he is exercising.  Goes to [employer].  No syncopal events.  No chest pains    The patient is status post transaortic valve replacement.  This was done on 2023.  A 34 mm valve was placed.     Cardiac catheterization performed on 2023 demonstrated mild coronary artery disease. Right heart catheterization demonstrated elevated pulmonary artery pressures and mildly elevated wedge pressure of 21.     The patient presented on 2015 with symptoms of chest discomfort and chest pressure. A stress thallium study was markedly abnormal showing a large area of anteroapical and inferoapical defect with an estimated ejection fraction of 41%. Cardiac catheterization was performed on 2015 which demonstrated abnormal left ventricular function with an estimated ejection fraction of 45%. The anterior descending had a 90% mid lesion treated with balloon angioplasty and stenting using a Promus premier drug-eluting stent. The circumflex had mild disease. The right coronary artery had mild disease.     The medical problems including history of bradycardia, hypertension, hyperlipidemia, and a past smoking history.     His wife  a few years ago.      Allergies  Medication    · Penicillins  Recorded By: Judi Landa; 2016 11:22:51 AM     Family History  Mother    · Family history of cerebral aneurysm (V17.1) (Z82.49)  Brother    · Family history of cardiac disorder (V17.49) (Z82.49)     Social History  Problems    · Active advance directive (V49.89) (Z78.9)   ·  Consumes alcohol occasionally (V49.89) (Z78.9)   · Former smoker (V15.82) (Z87.891)   · QUIT IN MID 20'S     Review of Systems   Constitutional: Positive for malaise/fatigue.   Cardiovascular:  Positive for dyspnea on exertion.   Musculoskeletal:  Positive for arthritis.   All other systems reviewed and are negative.    Current Outpatient Medications   Medication Sig Dispense Refill    acetaminophen (Tylenol) 325 mg tablet Take by mouth every 8 hours.      aspirin 81 mg EC tablet Take 1 tablet (81 mg) by mouth once daily.      dapagliflozin propanediol (Farxiga) 10 mg Take 1 tablet (10 mg) by mouth once daily. 90 tablet 3    docusate sodium (Colace) 100 mg capsule Take by mouth every 12 hours.      fexofenadine (Allegra) 180 mg tablet Take by mouth.      furosemide (Lasix) 20 mg tablet Take 1 tablet (20 mg) by mouth once daily. 90 tablet 3    hydrocortisone 2.5 % cream Apply topically if needed.      lisinopril 10 mg tablet Take 1 tablet (10 mg) by mouth once daily. 90 tablet 3    LORazepam (Ativan) 0.5 mg tablet Take 1 tablet (0.5 mg) by mouth once daily as needed for anxiety. 90 tablet 0    metoprolol succinate XL (Toprol-XL) 25 mg 24 hr tablet Take 0.5 tablets (12.5 mg) by mouth once daily. 45 tablet 3    rosuvastatin (Crestor) 5 mg tablet Take 1 tablet (5 mg) by mouth once daily. 90 tablet 3     No current facility-administered medications for this visit.        Visit Vitals  Smoking Status Never        Objective     Constitutional:       Appearance: Not in distress.   Neck:      Vascular: JVD normal.   Pulmonary:      Breath sounds: Normal breath sounds.   Cardiovascular:      Normal rate. Regular rhythm. S1 with normal intensity. S2 with normal intensity.       Murmurs: There is a grade 2/6 systolic murmur.      No gallop.    Pulses:     Decreased pulses.   Edema:     Peripheral edema present.     Pretibial: bilateral 1+ edema of the pretibial area.  Abdominal:      General: Bowel sounds are normal.    Neurological:      Mental Status: Alert and oriented to person, place and time.         Lab Review:   Lab Results   Component Value Date     07/22/2024    K 4.6 07/22/2024     07/22/2024    CO2 27 07/22/2024    BUN 33 (H) 07/22/2024    CREATININE 1.53 (H) 07/22/2024    GLUCOSE 105 (H) 07/22/2024    CALCIUM 9.0 07/22/2024     Lab Results   Component Value Date    CHOL 130 05/06/2024    TRIG 67 05/06/2024    HDL 56.9 05/06/2024       Assessment:    1.  Status post transaortic valve replacement.  On today's exam she does have a systolic ejection murmur.  However no diastolic murmurs present.  Latest echo study showed normal valve function.  We will repeat this on his next visit.    2.  Systolic congestive heart failure.  Ejection fraction is around 15% to 20%.  There has not been much improvement in his left ventricular function after he has transaortic valve procedure.  On appropriate medical management.  Remarkably he does very well with a very low ejection fraction.  Latest BNP is quite elevated at 1253.  However by exam he does not have much in the way of heart failure and his oxygen saturation is 97% in the future we could add Jardiance or Farxiga.    3.  Hyperlipidemia.  LDL 60.    4.  Coronary artery disease.  Mild by cardiac catheterization.    5.  Renal insufficiency.  Potassium 4.6, BUN 33, creatinine 1.5.  Will continue to track.

## 2024-08-19 ENCOUNTER — LAB (OUTPATIENT)
Dept: LAB | Facility: LAB | Age: 89
End: 2024-08-19
Payer: MEDICARE

## 2024-08-19 DIAGNOSIS — I25.118 CORONARY ARTERY DISEASE OF NATIVE ARTERY OF NATIVE HEART WITH STABLE ANGINA PECTORIS (CMS-HCC): ICD-10-CM

## 2024-08-19 DIAGNOSIS — I10 ESSENTIAL HYPERTENSION: ICD-10-CM

## 2024-08-19 DIAGNOSIS — I50.43 ACUTE ON CHRONIC COMBINED SYSTOLIC (CONGESTIVE) AND DIASTOLIC (CONGESTIVE) HEART FAILURE (MULTI): ICD-10-CM

## 2024-08-19 LAB
ANION GAP SERPL CALC-SCNC: 14 MMOL/L (ref 10–20)
BUN SERPL-MCNC: 37 MG/DL (ref 6–23)
CALCIUM SERPL-MCNC: 9.4 MG/DL (ref 8.6–10.6)
CHLORIDE SERPL-SCNC: 106 MMOL/L (ref 98–107)
CO2 SERPL-SCNC: 28 MMOL/L (ref 21–32)
CREAT SERPL-MCNC: 1.61 MG/DL (ref 0.5–1.3)
EGFRCR SERPLBLD CKD-EPI 2021: 39 ML/MIN/1.73M*2
GLUCOSE SERPL-MCNC: 62 MG/DL (ref 74–99)
POTASSIUM SERPL-SCNC: 4.2 MMOL/L (ref 3.5–5.3)
SODIUM SERPL-SCNC: 144 MMOL/L (ref 136–145)

## 2024-08-19 PROCEDURE — 36415 COLL VENOUS BLD VENIPUNCTURE: CPT

## 2024-08-19 PROCEDURE — 80048 BASIC METABOLIC PNL TOTAL CA: CPT

## 2024-08-20 ENCOUNTER — TELEPHONE (OUTPATIENT)
Dept: CARDIOLOGY | Facility: CLINIC | Age: 89
End: 2024-08-20

## 2024-08-20 NOTE — TELEPHONE ENCOUNTER
Pt called to see if Dr. Cardozo can adjust water pill.     Pt currently taking Furosemide 20mg daily. Pt states he is urinating every few hours and it is keeping him up at night. Pt denies SOB and denies edema.    Pt also asking if he can drink a few glasses of water daily? He tries to be careful because he doesn't want to increase fluid in his heart.    Please advise. Thanks.

## 2024-08-21 NOTE — TELEPHONE ENCOUNTER
No restrictions on water intake.  The furosemide effect will only last 3 hours. If he is urinating frequently, it is not from the furosemide.  May need to see Urologist (Dr. Durant [phone]) if this continues to be a problem

## 2024-10-14 ENCOUNTER — APPOINTMENT (OUTPATIENT)
Dept: PRIMARY CARE | Facility: CLINIC | Age: 89
End: 2024-10-14
Payer: MEDICARE

## 2024-10-14 VITALS
OXYGEN SATURATION: 98 % | TEMPERATURE: 97.4 F | HEIGHT: 64 IN | DIASTOLIC BLOOD PRESSURE: 78 MMHG | HEART RATE: 78 BPM | SYSTOLIC BLOOD PRESSURE: 122 MMHG | WEIGHT: 122.6 LBS | BODY MASS INDEX: 20.93 KG/M2

## 2024-10-14 DIAGNOSIS — I50.43 ACUTE ON CHRONIC COMBINED SYSTOLIC (CONGESTIVE) AND DIASTOLIC (CONGESTIVE) HEART FAILURE: Primary | ICD-10-CM

## 2024-10-14 DIAGNOSIS — F41.8 SITUATIONAL ANXIETY: ICD-10-CM

## 2024-10-14 DIAGNOSIS — Z95.2 S/P TAVR (TRANSCATHETER AORTIC VALVE REPLACEMENT): ICD-10-CM

## 2024-10-14 PROCEDURE — 1036F TOBACCO NON-USER: CPT | Performed by: FAMILY MEDICINE

## 2024-10-14 PROCEDURE — 1126F AMNT PAIN NOTED NONE PRSNT: CPT | Performed by: FAMILY MEDICINE

## 2024-10-14 PROCEDURE — 3078F DIAST BP <80 MM HG: CPT | Performed by: FAMILY MEDICINE

## 2024-10-14 PROCEDURE — 99213 OFFICE O/P EST LOW 20 MIN: CPT | Performed by: FAMILY MEDICINE

## 2024-10-14 PROCEDURE — 3074F SYST BP LT 130 MM HG: CPT | Performed by: FAMILY MEDICINE

## 2024-10-14 PROCEDURE — 1160F RVW MEDS BY RX/DR IN RCRD: CPT | Performed by: FAMILY MEDICINE

## 2024-10-14 PROCEDURE — 1159F MED LIST DOCD IN RCRD: CPT | Performed by: FAMILY MEDICINE

## 2024-10-14 PROCEDURE — 1157F ADVNC CARE PLAN IN RCRD: CPT | Performed by: FAMILY MEDICINE

## 2024-10-14 RX ORDER — PREDNISONE 10 MG/1
TABLET ORAL
COMMUNITY
Start: 2024-05-31

## 2024-10-14 RX ORDER — CLINDAMYCIN HYDROCHLORIDE 150 MG/1
CAPSULE ORAL
COMMUNITY
Start: 2024-09-04

## 2024-10-14 RX ORDER — LORAZEPAM 0.5 MG/1
0.5 TABLET ORAL DAILY PRN
Qty: 90 TABLET | Refills: 0 | Status: SHIPPED | OUTPATIENT
Start: 2024-10-14

## 2024-10-14 ASSESSMENT — PAIN SCALES - GENERAL: PAINLEVEL: 0-NO PAIN

## 2024-10-14 ASSESSMENT — ENCOUNTER SYMPTOMS
SLEEP DISTURBANCE: 1
DEPRESSION: 0
CONSTITUTIONAL NEGATIVE: 1
LOSS OF SENSATION IN FEET: 0
CARDIOVASCULAR NEGATIVE: 1
OCCASIONAL FEELINGS OF UNSTEADINESS: 0

## 2024-10-14 NOTE — PROGRESS NOTES
"Subjective   Patient ID: Sravan Christiansen is a 89 y.o. male who presents for Follow-up (Med refills).   3 month med refills  HPI     Review of Systems   Constitutional: Negative.    Cardiovascular: Negative.    Psychiatric/Behavioral:  Positive for sleep disturbance.        Objective   /78 (BP Location: Left arm)   Pulse 78   Temp 36.3 °C (97.4 °F) (Temporal)   Ht 1.613 m (5' 3.5\")   Wt 55.6 kg (122 lb 9.6 oz)   SpO2 98%   BMI 21.38 kg/m²     Physical Exam  Vitals and nursing note reviewed.   Constitutional:       Appearance: Normal appearance.   Cardiovascular:      Rate and Rhythm: Regular rhythm.   Pulmonary:      Breath sounds: Normal breath sounds.   Neurological:      General: No focal deficit present.      Mental Status: He is alert and oriented to person, place, and time.   Psychiatric:         Mood and Affect: Mood normal.         Behavior: Behavior normal.         Assessment/Plan patient seen here for follow-up on his lorazepam which continues to be effective for him for sleep.  We refilled his meds we will see him back in 3 months  Problem List Items Addressed This Visit             ICD-10-CM    Situational anxiety F41.8    Relevant Medications    LORazepam (Ativan) 0.5 mg tablet    Acute on chronic combined systolic (congestive) and diastolic (congestive) heart failure - Primary I50.43    S/P TAVR (transcatheter aortic valve replacement) Z95.2        OARRS:  No data recorded  I have personally reviewed the OARRS report for Sravan Christiansen. I have considered the risks of abuse, dependence, addiction and diversion    Is the patient prescribed a combination of a benzodiazepine and opioid?  Yes, I feel it is clincially indicated to continue the medication and have discussed with the patient risks/benefits/alternatives.    Last Urine Drug Screen / ordered today: No  Recent Results (from the past 8760 hour(s))   Drug Screen, Urine With Reflex to Confirmation    Collection Time: 07/26/24  1:04 PM "   Result Value Ref Range    Amphetamine Screen, Urine Presumptive Negative Presumptive Negative    Barbiturate Screen, Urine Presumptive Negative Presumptive Negative    Benzodiazepines Screen, Urine Presumptive Negative Presumptive Negative    Cannabinoid Screen, Urine Presumptive Negative Presumptive Negative    Cocaine Metabolite Screen, Urine Presumptive Negative Presumptive Negative    Fentanyl Screen, Urine Presumptive Negative Presumptive Negative    Opiate Screen, Urine Presumptive Negative Presumptive Negative    Oxycodone Screen, Urine Presumptive Negative Presumptive Negative    PCP Screen, Urine Presumptive Negative Presumptive Negative    Methadone Screen, Urine Presumptive Negative Presumptive Negative     Results are as expected.     Controlled Substance Agreement:  Date of the Last Agreement: 7/26/24  Reviewed Controlled Substance Agreement including but not limited to the benefits, risks, and alternatives to treatment with a Controlled Substance medication(s).    Benzodiazepines:  What is the patient's goal of therapy? sleep  Is this being achieved with current treatment? yes    BRITTANI-7:  No data recorded    Activities of Daily Living:   Is your overall impression that this patient is benefiting (symptom reduction outweighs side effects) from benzodiazepine therapy? Yes     1. Physical Functioning: Better  2. Family Relationship: Better  3. Social Relationship: Better  4. Mood: Better  5. Sleep Patterns: Better  6. Overall Function: Better

## 2024-11-13 ENCOUNTER — OFFICE VISIT (OUTPATIENT)
Dept: CARDIOLOGY | Facility: CLINIC | Age: 89
End: 2024-11-13
Payer: MEDICARE

## 2024-11-13 ENCOUNTER — HOSPITAL ENCOUNTER (OUTPATIENT)
Dept: CARDIOLOGY | Facility: CLINIC | Age: 89
Discharge: HOME | End: 2024-11-13
Payer: MEDICARE

## 2024-11-13 VITALS
DIASTOLIC BLOOD PRESSURE: 60 MMHG | OXYGEN SATURATION: 96 % | WEIGHT: 125 LBS | HEIGHT: 63 IN | SYSTOLIC BLOOD PRESSURE: 90 MMHG | HEART RATE: 73 BPM | BODY MASS INDEX: 22.15 KG/M2

## 2024-11-13 DIAGNOSIS — I50.43 ACUTE ON CHRONIC COMBINED SYSTOLIC (CONGESTIVE) AND DIASTOLIC (CONGESTIVE) HEART FAILURE: ICD-10-CM

## 2024-11-13 DIAGNOSIS — Z95.2 S/P TAVR (TRANSCATHETER AORTIC VALVE REPLACEMENT): ICD-10-CM

## 2024-11-13 DIAGNOSIS — I10 ESSENTIAL HYPERTENSION: ICD-10-CM

## 2024-11-13 DIAGNOSIS — I25.118 CORONARY ARTERY DISEASE OF NATIVE ARTERY OF NATIVE HEART WITH STABLE ANGINA PECTORIS: ICD-10-CM

## 2024-11-13 DIAGNOSIS — I50.42 CHRONIC COMBINED SYSTOLIC AND DIASTOLIC CONGESTIVE HEART FAILURE: ICD-10-CM

## 2024-11-13 DIAGNOSIS — I71.21 ANEURYSM OF ASCENDING AORTA WITHOUT RUPTURE (CMS-HCC): Primary | ICD-10-CM

## 2024-11-13 DIAGNOSIS — R00.2 PALPITATIONS: ICD-10-CM

## 2024-11-13 DIAGNOSIS — Z95.5 PRESENCE OF STENT IN CORONARY ARTERY: ICD-10-CM

## 2024-11-13 DIAGNOSIS — E78.2 HYPERLIPIDEMIA, MIXED: ICD-10-CM

## 2024-11-13 PROCEDURE — 3078F DIAST BP <80 MM HG: CPT | Performed by: INTERNAL MEDICINE

## 2024-11-13 PROCEDURE — 1157F ADVNC CARE PLAN IN RCRD: CPT | Performed by: INTERNAL MEDICINE

## 2024-11-13 PROCEDURE — 99214 OFFICE O/P EST MOD 30 MIN: CPT | Performed by: INTERNAL MEDICINE

## 2024-11-13 PROCEDURE — 93306 TTE W/DOPPLER COMPLETE: CPT

## 2024-11-13 PROCEDURE — 3074F SYST BP LT 130 MM HG: CPT | Performed by: INTERNAL MEDICINE

## 2024-11-13 PROCEDURE — 1036F TOBACCO NON-USER: CPT | Performed by: INTERNAL MEDICINE

## 2024-11-13 PROCEDURE — 99214 OFFICE O/P EST MOD 30 MIN: CPT | Mod: 25 | Performed by: INTERNAL MEDICINE

## 2024-11-13 PROCEDURE — 93306 TTE W/DOPPLER COMPLETE: CPT | Performed by: INTERNAL MEDICINE

## 2024-11-13 PROCEDURE — 1159F MED LIST DOCD IN RCRD: CPT | Performed by: INTERNAL MEDICINE

## 2024-11-13 NOTE — PROGRESS NOTES
Subjective   Sravan Christiansen is a 89 y.o. male.    Chief Complaint:  Follow-up transaortic valve replacement.  Fatigue and exertional dyspnea.    HPI    Continues to live independently.  Activities are quite limited.  He comes fatigued and short of breath rather quickly with activities.  No chest pain or chest pressure.  No orthopnea or paroxysmal nocturnal dyspnea.  Continues to try to exercise and walk on a treadmill.    The patient is status post transaortic valve replacement.  This was done on 2023.  A 34 mm valve was placed.     Cardiac catheterization performed on 2023 demonstrated mild coronary artery disease. Right heart catheterization demonstrated elevated pulmonary artery pressures and mildly elevated wedge pressure of 21.      The medical problems including history of bradycardia, hypertension, hyperlipidemia, and a past smoking history.     His wife  a few years ago.      Allergies  Medication    · Penicillins  Recorded By: Judi Landa; 2016 11:22:51 AM     Family History  Mother    · Family history of cerebral aneurysm (V17.1) (Z82.49)  Brother    · Family history of cardiac disorder (V17.49) (Z82.49)     Social History  Problems    · Active advance directive (V49.89) (Z78.9)   · Consumes alcohol occasionally (V49.89) (Z78.9)   · Former smoker (V15.82) (Z87.891)   · QUIT IN MID 20'S     Review of Systems   Constitutional: Positive for malaise/fatigue.   Cardiovascular:  Positive for dyspnea on exertion.   Musculoskeletal:  Positive for arthritis.   All other systems reviewed and are negative.     Current Outpatient Medications   Medication Sig Dispense Refill    aspirin 81 mg EC tablet Take 1 tablet (81 mg) by mouth once daily.      clindamycin (Cleocin) 150 mg capsule TK FOUR CS PO 1 HOUR B DAPP      dapagliflozin propanediol (Farxiga) 10 mg Take 1 tablet (10 mg) by mouth once daily. 90 tablet 3    fexofenadine (Allegra) 180 mg tablet Take by mouth.      furosemide (Lasix)  "20 mg tablet Take 1 tablet (20 mg) by mouth once daily. 90 tablet 3    lisinopril 10 mg tablet Take 1 tablet (10 mg) by mouth once daily. 90 tablet 3    LORazepam (Ativan) 0.5 mg tablet Take 1 tablet (0.5 mg) by mouth once daily as needed for anxiety. 90 tablet 0    metoprolol succinate XL (Toprol-XL) 25 mg 24 hr tablet Take 0.5 tablets (12.5 mg) by mouth once daily. 45 tablet 3    rosuvastatin (Crestor) 5 mg tablet Take 1 tablet (5 mg) by mouth once daily. 90 tablet 3     No current facility-administered medications for this visit.        Visit Vitals  BP 90/60 (BP Location: Right arm)   Pulse 73   Ht 1.6 m (5' 3\")   Wt 56.7 kg (125 lb)   SpO2 96%   BMI 22.14 kg/m²   Smoking Status Never   BSA 1.59 m²        Objective     Constitutional:       Appearance: Not in distress.   Neck:      Vascular: JVD normal.   Pulmonary:      Breath sounds: Normal breath sounds.   Cardiovascular:      Normal rate. Regular rhythm. S1 with normal intensity. S2 with normal intensity.       Murmurs: There is a grade 2/6 systolic murmur.      No gallop.    Pulses:     Intact distal pulses.   Edema:     Peripheral edema absent.   Abdominal:      General: Bowel sounds are normal.   Neurological:      Mental Status: Alert and oriented to person, place and time.         Lab Review:   Lab Results   Component Value Date     08/19/2024    K 4.2 08/19/2024     08/19/2024    CO2 28 08/19/2024    BUN 37 (H) 08/19/2024    CREATININE 1.61 (H) 08/19/2024    GLUCOSE 62 (L) 08/19/2024    CALCIUM 9.4 08/19/2024       Assessment:    1.  Coronary disease.  Mild by cardiac catheterization.  No anginal symptoms.  Medical management.    2.  Status post transaortic valve replacement.  On today's echo study the aortic valve is functioning normally.  No significant perivalvular leak.    3.  Systolic congestive heart failure.  Today's echo study shows an ejection fraction of about 20% to 25%.  Not much improvement from baseline.  He does remarkably " well with his low ejection fraction.  No definite heart failure by exam.  However this could certainly happen in the future.    4.  Chronic renal insufficiency.  Latest labs show potassium 4.2, BUN 37, creatinine 1.6.  Worsening renal dysfunction may be an issue in the future.

## 2024-11-13 NOTE — PATIENT INSTRUCTIONS
Your echocardiogram today shows mild improvement in heart function.    Your aortic valve is functioning normally.    No changes in your medications.    Get blood tests next month.  Wait until after you see Dr. Marie.    For runny nose try Allegra 180 mg daily or Flonase nasal spray.  These are over the counter.

## 2024-11-15 LAB
AORTIC VALVE MEAN GRADIENT: 8 MMHG
AORTIC VALVE PEAK VELOCITY: 2.14 M/S
AV PEAK GRADIENT: 18 MMHG
AVA (PEAK VEL): 1.06 CM2
AVA (VTI): 1.1 CM2
EJECTION FRACTION: 20 %
LEFT ATRIUM VOLUME AREA LENGTH INDEX BSA: 66.5 ML/M2
LEFT VENTRICLE INTERNAL DIMENSION DIASTOLE: 5.33 CM (ref 3.5–6)
LEFT VENTRICULAR OUTFLOW TRACT DIAMETER: 2 CM
RIGHT VENTRICLE FREE WALL PEAK S': 0.06 CM/S
TRICUSPID ANNULAR PLANE SYSTOLIC EXCURSION: 1.4 CM

## 2024-12-02 DIAGNOSIS — I50.42 CHRONIC COMBINED SYSTOLIC AND DIASTOLIC HF (HEART FAILURE), NYHA CLASS 1: ICD-10-CM

## 2024-12-02 DIAGNOSIS — I25.10 CORONARY ARTERY DISEASE INVOLVING NATIVE CORONARY ARTERY OF NATIVE HEART WITHOUT ANGINA PECTORIS: ICD-10-CM

## 2024-12-02 RX ORDER — METOPROLOL SUCCINATE 25 MG/1
TABLET, EXTENDED RELEASE ORAL DAILY
Qty: 45 TABLET | Refills: 3 | Status: SHIPPED | OUTPATIENT
Start: 2024-12-02

## 2024-12-03 ENCOUNTER — APPOINTMENT (OUTPATIENT)
Dept: PRIMARY CARE | Facility: CLINIC | Age: 89
End: 2024-12-03
Payer: MEDICARE

## 2024-12-06 ENCOUNTER — APPOINTMENT (OUTPATIENT)
Dept: PRIMARY CARE | Facility: CLINIC | Age: 89
End: 2024-12-06
Payer: MEDICARE

## 2024-12-27 ENCOUNTER — APPOINTMENT (OUTPATIENT)
Dept: PRIMARY CARE | Facility: CLINIC | Age: 89
End: 2024-12-27
Payer: MEDICARE

## 2024-12-27 ENCOUNTER — LAB (OUTPATIENT)
Dept: LAB | Facility: LAB | Age: 89
End: 2024-12-27
Payer: MEDICARE

## 2024-12-27 VITALS
TEMPERATURE: 97.3 F | WEIGHT: 125 LBS | SYSTOLIC BLOOD PRESSURE: 120 MMHG | HEIGHT: 63 IN | BODY MASS INDEX: 22.15 KG/M2 | DIASTOLIC BLOOD PRESSURE: 74 MMHG

## 2024-12-27 DIAGNOSIS — M75.101 ROTATOR CUFF TEAR ARTHROPATHY OF BOTH SHOULDERS: Primary | ICD-10-CM

## 2024-12-27 DIAGNOSIS — I50.42 CHRONIC COMBINED SYSTOLIC AND DIASTOLIC CONGESTIVE HEART FAILURE: ICD-10-CM

## 2024-12-27 DIAGNOSIS — M75.102 ROTATOR CUFF TEAR ARTHROPATHY OF BOTH SHOULDERS: Primary | ICD-10-CM

## 2024-12-27 DIAGNOSIS — I10 ESSENTIAL HYPERTENSION: ICD-10-CM

## 2024-12-27 DIAGNOSIS — I25.118 CORONARY ARTERY DISEASE OF NATIVE ARTERY OF NATIVE HEART WITH STABLE ANGINA PECTORIS: ICD-10-CM

## 2024-12-27 DIAGNOSIS — M12.812 ROTATOR CUFF TEAR ARTHROPATHY OF BOTH SHOULDERS: Primary | ICD-10-CM

## 2024-12-27 DIAGNOSIS — Z95.2 S/P TAVR (TRANSCATHETER AORTIC VALVE REPLACEMENT): ICD-10-CM

## 2024-12-27 DIAGNOSIS — M12.811 ROTATOR CUFF TEAR ARTHROPATHY OF BOTH SHOULDERS: Primary | ICD-10-CM

## 2024-12-27 DIAGNOSIS — E78.2 HYPERLIPIDEMIA, MIXED: ICD-10-CM

## 2024-12-27 DIAGNOSIS — F41.8 SITUATIONAL ANXIETY: ICD-10-CM

## 2024-12-27 LAB
ANION GAP SERPL CALC-SCNC: 16 MMOL/L (ref 10–20)
BUN SERPL-MCNC: 37 MG/DL (ref 6–23)
CALCIUM SERPL-MCNC: 9.1 MG/DL (ref 8.6–10.6)
CHLORIDE SERPL-SCNC: 106 MMOL/L (ref 98–107)
CO2 SERPL-SCNC: 27 MMOL/L (ref 21–32)
CREAT SERPL-MCNC: 1.74 MG/DL (ref 0.5–1.3)
EGFRCR SERPLBLD CKD-EPI 2021: 36 ML/MIN/1.73M*2
ERYTHROCYTE [DISTWIDTH] IN BLOOD BY AUTOMATED COUNT: 13.4 % (ref 11.5–14.5)
GLUCOSE SERPL-MCNC: 84 MG/DL (ref 74–99)
HCT VFR BLD AUTO: 41.3 % (ref 41–52)
HGB BLD-MCNC: 13 G/DL (ref 13.5–17.5)
MCH RBC QN AUTO: 31.6 PG (ref 26–34)
MCHC RBC AUTO-ENTMCNC: 31.5 G/DL (ref 32–36)
MCV RBC AUTO: 100 FL (ref 80–100)
NRBC BLD-RTO: 0 /100 WBCS (ref 0–0)
PLATELET # BLD AUTO: 137 X10*3/UL (ref 150–450)
POTASSIUM SERPL-SCNC: 4.7 MMOL/L (ref 3.5–5.3)
RBC # BLD AUTO: 4.12 X10*6/UL (ref 4.5–5.9)
SODIUM SERPL-SCNC: 144 MMOL/L (ref 136–145)
WBC # BLD AUTO: 6.3 X10*3/UL (ref 4.4–11.3)

## 2024-12-27 PROCEDURE — G2211 COMPLEX E/M VISIT ADD ON: HCPCS | Performed by: FAMILY MEDICINE

## 2024-12-27 PROCEDURE — 3074F SYST BP LT 130 MM HG: CPT | Performed by: FAMILY MEDICINE

## 2024-12-27 PROCEDURE — 1036F TOBACCO NON-USER: CPT | Performed by: FAMILY MEDICINE

## 2024-12-27 PROCEDURE — 1160F RVW MEDS BY RX/DR IN RCRD: CPT | Performed by: FAMILY MEDICINE

## 2024-12-27 PROCEDURE — 3078F DIAST BP <80 MM HG: CPT | Performed by: FAMILY MEDICINE

## 2024-12-27 PROCEDURE — 1126F AMNT PAIN NOTED NONE PRSNT: CPT | Performed by: FAMILY MEDICINE

## 2024-12-27 PROCEDURE — 80048 BASIC METABOLIC PNL TOTAL CA: CPT

## 2024-12-27 PROCEDURE — 1157F ADVNC CARE PLAN IN RCRD: CPT | Performed by: FAMILY MEDICINE

## 2024-12-27 PROCEDURE — 99214 OFFICE O/P EST MOD 30 MIN: CPT | Performed by: FAMILY MEDICINE

## 2024-12-27 PROCEDURE — 1159F MED LIST DOCD IN RCRD: CPT | Performed by: FAMILY MEDICINE

## 2024-12-27 PROCEDURE — 85027 COMPLETE CBC AUTOMATED: CPT

## 2024-12-27 RX ORDER — LORAZEPAM 0.5 MG/1
0.5 TABLET ORAL DAILY PRN
Qty: 90 TABLET | Refills: 0 | Status: SHIPPED | OUTPATIENT
Start: 2024-12-27

## 2024-12-27 ASSESSMENT — ENCOUNTER SYMPTOMS
LOSS OF SENSATION IN FEET: 0
NERVOUS/ANXIOUS: 1
CARDIOVASCULAR NEGATIVE: 1
NEUROLOGICAL NEGATIVE: 1
ARTHRALGIAS: 1
CONSTITUTIONAL NEGATIVE: 1
DEPRESSION: 0
ENDOCRINE COMMENTS: THYROID DISEASE
RESPIRATORY NEGATIVE: 1
SLEEP DISTURBANCE: 1
OCCASIONAL FEELINGS OF UNSTEADINESS: 0

## 2024-12-27 ASSESSMENT — PAIN SCALES - GENERAL: PAINLEVEL_OUTOF10: 0-NO PAIN

## 2024-12-27 NOTE — PROGRESS NOTES
"Subjective   Patient ID: Sravan Christiansen is a 89 y.o. male who presents for hosps follow up ([facility] 12/23/2024 -numbness in fingers on the right hand ).    HPI     Review of Systems   Constitutional: Negative.    Respiratory: Negative.     Cardiovascular: Negative.         Dr Cardozo   Endocrine:        Thyroid disease   Musculoskeletal:  Positive for arthralgias.   Neurological: Negative.    Psychiatric/Behavioral:  Positive for sleep disturbance. The patient is nervous/anxious.        Objective   /74 (BP Location: Left arm)   Temp 36.3 °C (97.3 °F) (Temporal)   Ht 1.6 m (5' 3\")   Wt 56.7 kg (125 lb)   BMI 22.14 kg/m²     Physical Exam  Vitals and nursing note reviewed.   Constitutional:       Appearance: Normal appearance.   Cardiovascular:      Rate and Rhythm: Normal rate and regular rhythm.      Pulses: Normal pulses.      Heart sounds: Normal heart sounds.   Pulmonary:      Breath sounds: Normal breath sounds.   Musculoskeletal:      Comments: DJD right shoulder   Neurological:      General: No focal deficit present.      Mental Status: He is alert and oriented to person, place, and time.   Psychiatric:         Mood and Affect: Mood normal.         Behavior: Behavior normal.         Assessment/Plan patient seen here to follow-up after having had an emergency room visit at [facility] for some paresthesias in his right hand these are secondary to his chronic rotator cuff tears he is much better now I reassured him that this is not a cardiac problem.  He is getting some lab work from cardiology today.  Will see him back as needed.  We did refill his lorazepam today  Problem List Items Addressed This Visit             ICD-10-CM    Essential hypertension I10    Hyperlipidemia, mixed E78.2    Situational anxiety F41.8    Relevant Medications    LORazepam (Ativan) 0.5 mg tablet    Chronic combined systolic and diastolic congestive heart failure I50.42    S/P TAVR (transcatheter aortic valve replacement) Z95.2 "     Other Visit Diagnoses         Codes    Rotator cuff tear arthropathy of both shoulders    -  Primary M75.101, M12.811, M12.812, M75.102

## 2025-01-01 ENCOUNTER — HOSPITAL ENCOUNTER (INPATIENT)
Facility: HOSPITAL | Age: OVER 89
LOS: 1 days | Discharge: HOME | End: 2025-01-03
Attending: STUDENT IN AN ORGANIZED HEALTH CARE EDUCATION/TRAINING PROGRAM | Admitting: STUDENT IN AN ORGANIZED HEALTH CARE EDUCATION/TRAINING PROGRAM
Payer: MEDICARE

## 2025-01-01 ENCOUNTER — APPOINTMENT (OUTPATIENT)
Dept: RADIOLOGY | Facility: HOSPITAL | Age: OVER 89
End: 2025-01-01
Payer: MEDICARE

## 2025-01-01 DIAGNOSIS — R06.02 SHORTNESS OF BREATH: Primary | ICD-10-CM

## 2025-01-01 DIAGNOSIS — I50.9 ACUTE ON CHRONIC CONGESTIVE HEART FAILURE, UNSPECIFIED HEART FAILURE TYPE: ICD-10-CM

## 2025-01-01 DIAGNOSIS — I50.42 CHRONIC COMBINED SYSTOLIC AND DIASTOLIC HF (HEART FAILURE), NYHA CLASS 1: ICD-10-CM

## 2025-01-01 DIAGNOSIS — I48.92 ATRIAL FLUTTER, UNSPECIFIED TYPE (MULTI): ICD-10-CM

## 2025-01-01 DIAGNOSIS — R79.89 ELEVATED TROPONIN: ICD-10-CM

## 2025-01-01 LAB
ALBUMIN SERPL BCP-MCNC: 4.1 G/DL (ref 3.4–5)
ALP SERPL-CCNC: 71 U/L (ref 33–136)
ALT SERPL W P-5'-P-CCNC: 22 U/L (ref 10–52)
ANION GAP SERPL CALC-SCNC: 15 MMOL/L (ref 10–20)
APPEARANCE UR: CLEAR
AST SERPL W P-5'-P-CCNC: 28 U/L (ref 9–39)
BASOPHILS # BLD AUTO: 0.05 X10*3/UL (ref 0–0.1)
BASOPHILS NFR BLD AUTO: 0.8 %
BILIRUB SERPL-MCNC: 1.5 MG/DL (ref 0–1.2)
BILIRUB UR STRIP.AUTO-MCNC: NEGATIVE MG/DL
BNP SERPL-MCNC: 2881 PG/ML (ref 0–99)
BUN SERPL-MCNC: 40 MG/DL (ref 6–23)
CALCIUM SERPL-MCNC: 9 MG/DL (ref 8.6–10.3)
CARDIAC TROPONIN I PNL SERPL HS: 102 NG/L (ref 0–20)
CARDIAC TROPONIN I PNL SERPL HS: 123 NG/L (ref 0–20)
CARDIAC TROPONIN I PNL SERPL HS: 125 NG/L (ref 0–20)
CHLORIDE SERPL-SCNC: 108 MMOL/L (ref 98–107)
CO2 SERPL-SCNC: 23 MMOL/L (ref 21–32)
COLOR UR: YELLOW
CREAT SERPL-MCNC: 1.78 MG/DL (ref 0.5–1.3)
D DIMER PPP FEU-MCNC: 355 NG/ML FEU
EGFRCR SERPLBLD CKD-EPI 2021: 35 ML/MIN/1.73M*2
EOSINOPHIL # BLD AUTO: 0.1 X10*3/UL (ref 0–0.4)
EOSINOPHIL NFR BLD AUTO: 1.7 %
ERYTHROCYTE [DISTWIDTH] IN BLOOD BY AUTOMATED COUNT: 13.5 % (ref 11.5–14.5)
FLUAV RNA RESP QL NAA+PROBE: NOT DETECTED
FLUBV RNA RESP QL NAA+PROBE: NOT DETECTED
GLUCOSE SERPL-MCNC: 116 MG/DL (ref 74–99)
GLUCOSE UR STRIP.AUTO-MCNC: ABNORMAL MG/DL
HCT VFR BLD AUTO: 41.3 % (ref 41–52)
HGB BLD-MCNC: 12.9 G/DL (ref 13.5–17.5)
IMM GRANULOCYTES # BLD AUTO: 0.02 X10*3/UL (ref 0–0.5)
IMM GRANULOCYTES NFR BLD AUTO: 0.3 % (ref 0–0.9)
INR PPP: 1.3 (ref 0.9–1.1)
KETONES UR STRIP.AUTO-MCNC: NEGATIVE MG/DL
LEUKOCYTE ESTERASE UR QL STRIP.AUTO: NEGATIVE
LYMPHOCYTES # BLD AUTO: 1 X10*3/UL (ref 0.8–3)
LYMPHOCYTES NFR BLD AUTO: 16.8 %
MAGNESIUM SERPL-MCNC: 2.36 MG/DL (ref 1.6–2.4)
MCH RBC QN AUTO: 31 PG (ref 26–34)
MCHC RBC AUTO-ENTMCNC: 31.2 G/DL (ref 32–36)
MCV RBC AUTO: 99 FL (ref 80–100)
MONOCYTES # BLD AUTO: 0.51 X10*3/UL (ref 0.05–0.8)
MONOCYTES NFR BLD AUTO: 8.6 %
NEUTROPHILS # BLD AUTO: 4.27 X10*3/UL (ref 1.6–5.5)
NEUTROPHILS NFR BLD AUTO: 71.8 %
NITRITE UR QL STRIP.AUTO: NEGATIVE
NRBC BLD-RTO: 0 /100 WBCS (ref 0–0)
PH UR STRIP.AUTO: 5 [PH]
PLATELET # BLD AUTO: 134 X10*3/UL (ref 150–450)
POTASSIUM SERPL-SCNC: 4.8 MMOL/L (ref 3.5–5.3)
PROT SERPL-MCNC: 6.4 G/DL (ref 6.4–8.2)
PROT UR STRIP.AUTO-MCNC: ABNORMAL MG/DL
PROTHROMBIN TIME: 14.2 SECONDS (ref 9.8–12.8)
RBC # BLD AUTO: 4.16 X10*6/UL (ref 4.5–5.9)
RBC # UR STRIP.AUTO: ABNORMAL /UL
RBC #/AREA URNS AUTO: NORMAL /HPF
SARS-COV-2 RNA RESP QL NAA+PROBE: NOT DETECTED
SODIUM SERPL-SCNC: 141 MMOL/L (ref 136–145)
SP GR UR STRIP.AUTO: 1.02
UROBILINOGEN UR STRIP.AUTO-MCNC: ABNORMAL MG/DL
WBC # BLD AUTO: 6 X10*3/UL (ref 4.4–11.3)
WBC #/AREA URNS AUTO: NORMAL /HPF

## 2025-01-01 PROCEDURE — 71046 X-RAY EXAM CHEST 2 VIEWS: CPT | Performed by: RADIOLOGY

## 2025-01-01 PROCEDURE — 71046 X-RAY EXAM CHEST 2 VIEWS: CPT

## 2025-01-01 PROCEDURE — 81001 URINALYSIS AUTO W/SCOPE: CPT | Performed by: NURSE PRACTITIONER

## 2025-01-01 PROCEDURE — 96374 THER/PROPH/DIAG INJ IV PUSH: CPT

## 2025-01-01 PROCEDURE — 99223 1ST HOSP IP/OBS HIGH 75: CPT | Performed by: STUDENT IN AN ORGANIZED HEALTH CARE EDUCATION/TRAINING PROGRAM

## 2025-01-01 PROCEDURE — 83735 ASSAY OF MAGNESIUM: CPT | Performed by: NURSE PRACTITIONER

## 2025-01-01 PROCEDURE — 84484 ASSAY OF TROPONIN QUANT: CPT | Performed by: STUDENT IN AN ORGANIZED HEALTH CARE EDUCATION/TRAINING PROGRAM

## 2025-01-01 PROCEDURE — 87636 SARSCOV2 & INF A&B AMP PRB: CPT | Performed by: NURSE PRACTITIONER

## 2025-01-01 PROCEDURE — 85379 FIBRIN DEGRADATION QUANT: CPT | Performed by: NURSE PRACTITIONER

## 2025-01-01 PROCEDURE — 99285 EMERGENCY DEPT VISIT HI MDM: CPT | Mod: 25 | Performed by: STUDENT IN AN ORGANIZED HEALTH CARE EDUCATION/TRAINING PROGRAM

## 2025-01-01 PROCEDURE — 80053 COMPREHEN METABOLIC PANEL: CPT | Performed by: NURSE PRACTITIONER

## 2025-01-01 PROCEDURE — 2500000004 HC RX 250 GENERAL PHARMACY W/ HCPCS (ALT 636 FOR OP/ED): Performed by: STUDENT IN AN ORGANIZED HEALTH CARE EDUCATION/TRAINING PROGRAM

## 2025-01-01 PROCEDURE — 84484 ASSAY OF TROPONIN QUANT: CPT | Performed by: NURSE PRACTITIONER

## 2025-01-01 PROCEDURE — 85610 PROTHROMBIN TIME: CPT | Performed by: NURSE PRACTITIONER

## 2025-01-01 PROCEDURE — 2500000001 HC RX 250 WO HCPCS SELF ADMINISTERED DRUGS (ALT 637 FOR MEDICARE OP): Performed by: STUDENT IN AN ORGANIZED HEALTH CARE EDUCATION/TRAINING PROGRAM

## 2025-01-01 PROCEDURE — 93005 ELECTROCARDIOGRAM TRACING: CPT

## 2025-01-01 PROCEDURE — 85025 COMPLETE CBC W/AUTO DIFF WBC: CPT | Performed by: NURSE PRACTITIONER

## 2025-01-01 PROCEDURE — 36415 COLL VENOUS BLD VENIPUNCTURE: CPT | Performed by: NURSE PRACTITIONER

## 2025-01-01 PROCEDURE — 83880 ASSAY OF NATRIURETIC PEPTIDE: CPT | Performed by: NURSE PRACTITIONER

## 2025-01-01 RX ORDER — NAPROXEN SODIUM 220 MG/1
324 TABLET, FILM COATED ORAL ONCE
Status: COMPLETED | OUTPATIENT
Start: 2025-01-01 | End: 2025-01-01

## 2025-01-01 RX ORDER — FUROSEMIDE 10 MG/ML
40 INJECTION INTRAMUSCULAR; INTRAVENOUS ONCE
Status: COMPLETED | OUTPATIENT
Start: 2025-01-01 | End: 2025-01-01

## 2025-01-01 RX ADMIN — ASPIRIN 81 MG 324 MG: 81 TABLET ORAL at 22:33

## 2025-01-01 RX ADMIN — FUROSEMIDE 40 MG: 10 INJECTION, SOLUTION INTRAMUSCULAR; INTRAVENOUS at 22:33

## 2025-01-01 ASSESSMENT — LIFESTYLE VARIABLES
HAVE PEOPLE ANNOYED YOU BY CRITICIZING YOUR DRINKING: NO
EVER FELT BAD OR GUILTY ABOUT YOUR DRINKING: NO
TOTAL SCORE: 0
HAVE YOU EVER FELT YOU SHOULD CUT DOWN ON YOUR DRINKING: NO
EVER HAD A DRINK FIRST THING IN THE MORNING TO STEADY YOUR NERVES TO GET RID OF A HANGOVER: NO

## 2025-01-01 ASSESSMENT — PAIN SCALES - GENERAL: PAINLEVEL_OUTOF10: 0 - NO PAIN

## 2025-01-01 NOTE — ED TRIAGE NOTES
TRIAGE NOTE   I saw the patient as the Clinician in Triage and performed a brief history and physical exam, established acuity, and ordered appropriate tests to develop basic plan of care. Patient will be seen by an [facility], resident and/or physician who will independently evaluate the patient. Please see subsequent provider notes for further details and disposition.     Brief HPI: In brief, Sravan Christiansen is a 89 y.o. male that presents for HTN, HLD, CHF, CAD, s/p TAVR, prostate cancer and CKD presenting to ED today from home with family for evaluation of shortness of breath.  For the last several weeks the patient reports increasing shortness of breath exacerbated with exertion.  The patient is profoundly weak.  Denies lower extremity edema, weight gain/weight loss and orthopnea.  No history of PE/DVT, recent travel, recent surgery or use of exogenous hormones but was treated for prostate cancer.  Denies cough/cold symptoms, chest pain, nausea/vomiting, abdominal pain, urinary symptoms, change in bowel habits or any other complaints.  Occasional EtOH, no smoking or IV drug use.  PCP is Dr. Marie, cardiologist is Dr. Cardozo.    Focused Physical exam:   General: Pleasant elderly  male, awake and alert, oriented x 3.  Thin with some muscle wasting, well hydrated.  Weak but nontoxic looking.  Skin: Pale pink, warm and dry.  Cardiac: Regular rate and rhythm.  Pulmonary: Crackles in bilateral bases.  Abdomen: Flat and soft with bowel sounds, nontender.  No CVA tenderness.  Musculoskeletal: No edema, SCAR x 4.  MSPs intact.  Skin intact.  No deformities.    Plan/MDM:   89 y.o. male that presents for HTN, HLD, CHF, CAD, prostate cancer and CKD is evaluated at the bedside for increasing shortness of breath worsening over the last month with extreme weakness.  Vital signs within normal limits.  Afebrile.  Few crackles in bilateral bases.  Abdomen soft and nontender.  Neuro intact.  IV established, will check  basic labs, EKG, chest x-ray, UA/urine culture and viral swabs in preparation for further evaluation in the main ED.  Patient is agreeable to this plan.    Please see subsequent provider note for further details and disposition

## 2025-01-01 NOTE — ED TRIAGE NOTES
PT PRESENTS TO ED VIA PRIVATE AUTO FOR SHORTNESS OF BREATH WITH EXERTION X SEVERAL WEEKS. PT DENIES CP.

## 2025-01-02 ENCOUNTER — APPOINTMENT (OUTPATIENT)
Dept: CARDIOLOGY | Facility: HOSPITAL | Age: OVER 89
End: 2025-01-02
Payer: MEDICARE

## 2025-01-02 PROBLEM — R06.02 SHORTNESS OF BREATH: Status: ACTIVE | Noted: 2025-01-02

## 2025-01-02 LAB
ANION GAP SERPL CALC-SCNC: 15 MMOL/L (ref 10–20)
ATRIAL RATE: 267 BPM
BUN SERPL-MCNC: 40 MG/DL (ref 6–23)
CALCIUM SERPL-MCNC: 8.8 MG/DL (ref 8.6–10.3)
CHLORIDE SERPL-SCNC: 107 MMOL/L (ref 98–107)
CO2 SERPL-SCNC: 24 MMOL/L (ref 21–32)
CREAT SERPL-MCNC: 1.72 MG/DL (ref 0.5–1.3)
EGFRCR SERPLBLD CKD-EPI 2021: 36 ML/MIN/1.73M*2
ERYTHROCYTE [DISTWIDTH] IN BLOOD BY AUTOMATED COUNT: 13.4 % (ref 11.5–14.5)
GLUCOSE SERPL-MCNC: 101 MG/DL (ref 74–99)
HCT VFR BLD AUTO: 39.3 % (ref 41–52)
HGB BLD-MCNC: 12.4 G/DL (ref 13.5–17.5)
HOLD SPECIMEN: NORMAL
MAGNESIUM SERPL-MCNC: 2.31 MG/DL (ref 1.6–2.4)
MCH RBC QN AUTO: 31.2 PG (ref 26–34)
MCHC RBC AUTO-ENTMCNC: 31.6 G/DL (ref 32–36)
MCV RBC AUTO: 99 FL (ref 80–100)
NRBC BLD-RTO: 0 /100 WBCS (ref 0–0)
PLATELET # BLD AUTO: 134 X10*3/UL (ref 150–450)
POTASSIUM SERPL-SCNC: 4.2 MMOL/L (ref 3.5–5.3)
Q ONSET: 212 MS
QRS COUNT: 13 BEATS
QRS DURATION: 122 MS
QT INTERVAL: 396 MS
QTC CALCULATION(BAZETT): 460 MS
QTC FREDERICIA: 437 MS
R AXIS: -50 DEGREES
RBC # BLD AUTO: 3.97 X10*6/UL (ref 4.5–5.9)
SODIUM SERPL-SCNC: 142 MMOL/L (ref 136–145)
T AXIS: 88 DEGREES
T OFFSET: 410 MS
VENTRICULAR RATE: 81 BPM
WBC # BLD AUTO: 5.6 X10*3/UL (ref 4.4–11.3)

## 2025-01-02 PROCEDURE — 2500000002 HC RX 250 W HCPCS SELF ADMINISTERED DRUGS (ALT 637 FOR MEDICARE OP, ALT 636 FOR OP/ED): Performed by: STUDENT IN AN ORGANIZED HEALTH CARE EDUCATION/TRAINING PROGRAM

## 2025-01-02 PROCEDURE — 85027 COMPLETE CBC AUTOMATED: CPT | Performed by: STUDENT IN AN ORGANIZED HEALTH CARE EDUCATION/TRAINING PROGRAM

## 2025-01-02 PROCEDURE — 1200000002 HC GENERAL ROOM WITH TELEMETRY DAILY

## 2025-01-02 PROCEDURE — 99223 1ST HOSP IP/OBS HIGH 75: CPT | Performed by: INTERNAL MEDICINE

## 2025-01-02 PROCEDURE — 2500000001 HC RX 250 WO HCPCS SELF ADMINISTERED DRUGS (ALT 637 FOR MEDICARE OP): Performed by: INTERNAL MEDICINE

## 2025-01-02 PROCEDURE — 83735 ASSAY OF MAGNESIUM: CPT | Performed by: STUDENT IN AN ORGANIZED HEALTH CARE EDUCATION/TRAINING PROGRAM

## 2025-01-02 PROCEDURE — 36415 COLL VENOUS BLD VENIPUNCTURE: CPT | Performed by: STUDENT IN AN ORGANIZED HEALTH CARE EDUCATION/TRAINING PROGRAM

## 2025-01-02 PROCEDURE — 2500000004 HC RX 250 GENERAL PHARMACY W/ HCPCS (ALT 636 FOR OP/ED): Performed by: STUDENT IN AN ORGANIZED HEALTH CARE EDUCATION/TRAINING PROGRAM

## 2025-01-02 PROCEDURE — 99232 SBSQ HOSP IP/OBS MODERATE 35: CPT | Performed by: STUDENT IN AN ORGANIZED HEALTH CARE EDUCATION/TRAINING PROGRAM

## 2025-01-02 PROCEDURE — 80048 BASIC METABOLIC PNL TOTAL CA: CPT | Performed by: STUDENT IN AN ORGANIZED HEALTH CARE EDUCATION/TRAINING PROGRAM

## 2025-01-02 PROCEDURE — 2500000001 HC RX 250 WO HCPCS SELF ADMINISTERED DRUGS (ALT 637 FOR MEDICARE OP): Performed by: STUDENT IN AN ORGANIZED HEALTH CARE EDUCATION/TRAINING PROGRAM

## 2025-01-02 RX ORDER — FUROSEMIDE 40 MG/1
40 TABLET ORAL
Status: DISCONTINUED | OUTPATIENT
Start: 2025-01-02 | End: 2025-01-03 | Stop reason: HOSPADM

## 2025-01-02 RX ORDER — ROSUVASTATIN CALCIUM 10 MG/1
5 TABLET, COATED ORAL NIGHTLY
Status: DISCONTINUED | OUTPATIENT
Start: 2025-01-02 | End: 2025-01-03 | Stop reason: HOSPADM

## 2025-01-02 RX ORDER — METOPROLOL SUCCINATE 25 MG/1
25 TABLET, EXTENDED RELEASE ORAL DAILY
Status: DISCONTINUED | OUTPATIENT
Start: 2025-01-02 | End: 2025-01-03 | Stop reason: HOSPADM

## 2025-01-02 RX ORDER — ASPIRIN 81 MG/1
81 TABLET ORAL DAILY
Status: DISCONTINUED | OUTPATIENT
Start: 2025-01-02 | End: 2025-01-02

## 2025-01-02 RX ORDER — FUROSEMIDE 10 MG/ML
20 INJECTION INTRAMUSCULAR; INTRAVENOUS EVERY 12 HOURS
Status: DISCONTINUED | OUTPATIENT
Start: 2025-01-02 | End: 2025-01-02

## 2025-01-02 RX ORDER — DAPAGLIFLOZIN 10 MG/1
10 TABLET, FILM COATED ORAL DAILY
Status: DISCONTINUED | OUTPATIENT
Start: 2025-01-02 | End: 2025-01-03 | Stop reason: HOSPADM

## 2025-01-02 RX ORDER — HEPARIN SODIUM 5000 [USP'U]/ML
5000 INJECTION, SOLUTION INTRAVENOUS; SUBCUTANEOUS EVERY 8 HOURS
Status: DISCONTINUED | OUTPATIENT
Start: 2025-01-02 | End: 2025-01-02

## 2025-01-02 RX ADMIN — FUROSEMIDE 40 MG: 40 TABLET ORAL at 17:29

## 2025-01-02 RX ADMIN — HEPARIN SODIUM 5000 UNITS: 5000 INJECTION INTRAVENOUS; SUBCUTANEOUS at 00:50

## 2025-01-02 RX ADMIN — HEPARIN SODIUM 5000 UNITS: 5000 INJECTION INTRAVENOUS; SUBCUTANEOUS at 08:59

## 2025-01-02 RX ADMIN — METOPROLOL SUCCINATE 25 MG: 25 TABLET, EXTENDED RELEASE ORAL at 09:00

## 2025-01-02 RX ADMIN — DAPAGLIFLOZIN 10 MG: 10 TABLET, FILM COATED ORAL at 10:48

## 2025-01-02 RX ADMIN — APIXABAN 2.5 MG: 5 TABLET, FILM COATED ORAL at 17:29

## 2025-01-02 RX ADMIN — ASPIRIN 81 MG: 81 TABLET, COATED ORAL at 08:59

## 2025-01-02 RX ADMIN — ROSUVASTATIN CALCIUM 5 MG: 10 TABLET, FILM COATED ORAL at 10:47

## 2025-01-02 SDOH — SOCIAL STABILITY: SOCIAL INSECURITY: WERE YOU ABLE TO COMPLETE ALL THE BEHAVIORAL HEALTH SCREENINGS?: YES

## 2025-01-02 SDOH — SOCIAL STABILITY: SOCIAL INSECURITY
WITHIN THE LAST YEAR, HAVE YOU BEEN KICKED, HIT, SLAPPED, OR OTHERWISE PHYSICALLY HURT BY YOUR PARTNER OR EX-PARTNER?: NO

## 2025-01-02 SDOH — ECONOMIC STABILITY: FOOD INSECURITY: WITHIN THE PAST 12 MONTHS, YOU WORRIED THAT YOUR FOOD WOULD RUN OUT BEFORE YOU GOT THE MONEY TO BUY MORE.: NEVER TRUE

## 2025-01-02 SDOH — SOCIAL STABILITY: SOCIAL INSECURITY: DO YOU FEEL ANYONE HAS EXPLOITED OR TAKEN ADVANTAGE OF YOU FINANCIALLY OR OF YOUR PERSONAL PROPERTY?: NO

## 2025-01-02 SDOH — ECONOMIC STABILITY: INCOME INSECURITY: IN THE PAST 12 MONTHS HAS THE ELECTRIC, GAS, OIL, OR WATER COMPANY THREATENED TO SHUT OFF SERVICES IN YOUR HOME?: NO

## 2025-01-02 SDOH — ECONOMIC STABILITY: FOOD INSECURITY: WITHIN THE PAST 12 MONTHS, THE FOOD YOU BOUGHT JUST DIDN'T LAST AND YOU DIDN'T HAVE MONEY TO GET MORE.: NEVER TRUE

## 2025-01-02 SDOH — SOCIAL STABILITY: SOCIAL INSECURITY: WITHIN THE LAST YEAR, HAVE YOU BEEN AFRAID OF YOUR PARTNER OR EX-PARTNER?: NO

## 2025-01-02 SDOH — ECONOMIC STABILITY: HOUSING INSECURITY: IN THE PAST 12 MONTHS, HOW MANY TIMES HAVE YOU MOVED WHERE YOU WERE LIVING?: 1

## 2025-01-02 SDOH — SOCIAL STABILITY: SOCIAL INSECURITY: WITHIN THE LAST YEAR, HAVE YOU BEEN HUMILIATED OR EMOTIONALLY ABUSED IN OTHER WAYS BY YOUR PARTNER OR EX-PARTNER?: NO

## 2025-01-02 SDOH — SOCIAL STABILITY: SOCIAL INSECURITY
WITHIN THE LAST YEAR, HAVE YOU BEEN RAPED OR FORCED TO HAVE ANY KIND OF SEXUAL ACTIVITY BY YOUR PARTNER OR EX-PARTNER?: NO

## 2025-01-02 SDOH — SOCIAL STABILITY: SOCIAL INSECURITY: HAVE YOU HAD THOUGHTS OF HARMING ANYONE ELSE?: NO

## 2025-01-02 SDOH — SOCIAL STABILITY: SOCIAL INSECURITY: ARE YOU OR HAVE YOU BEEN THREATENED OR ABUSED PHYSICALLY, EMOTIONALLY, OR SEXUALLY BY ANYONE?: NO

## 2025-01-02 SDOH — ECONOMIC STABILITY: HOUSING INSECURITY: AT ANY TIME IN THE PAST 12 MONTHS, WERE YOU HOMELESS OR LIVING IN A SHELTER (INCLUDING NOW)?: NO

## 2025-01-02 SDOH — ECONOMIC STABILITY: TRANSPORTATION INSECURITY: IN THE PAST 12 MONTHS, HAS LACK OF TRANSPORTATION KEPT YOU FROM MEDICAL APPOINTMENTS OR FROM GETTING MEDICATIONS?: NO

## 2025-01-02 SDOH — SOCIAL STABILITY: SOCIAL INSECURITY: DOES ANYONE TRY TO KEEP YOU FROM HAVING/CONTACTING OTHER FRIENDS OR DOING THINGS OUTSIDE YOUR HOME?: NO

## 2025-01-02 SDOH — SOCIAL STABILITY: SOCIAL INSECURITY: DO YOU FEEL UNSAFE GOING BACK TO THE PLACE WHERE YOU ARE LIVING?: NO

## 2025-01-02 SDOH — SOCIAL STABILITY: SOCIAL INSECURITY: HAVE YOU HAD ANY THOUGHTS OF HARMING ANYONE ELSE?: NO

## 2025-01-02 SDOH — SOCIAL STABILITY: SOCIAL INSECURITY: HAS ANYONE EVER THREATENED TO HURT YOUR FAMILY OR YOUR PETS?: NO

## 2025-01-02 SDOH — ECONOMIC STABILITY: FOOD INSECURITY: HOW HARD IS IT FOR YOU TO PAY FOR THE VERY BASICS LIKE FOOD, HOUSING, MEDICAL CARE, AND HEATING?: NOT VERY HARD

## 2025-01-02 SDOH — ECONOMIC STABILITY: HOUSING INSECURITY: IN THE LAST 12 MONTHS, WAS THERE A TIME WHEN YOU WERE NOT ABLE TO PAY THE MORTGAGE OR RENT ON TIME?: NO

## 2025-01-02 SDOH — SOCIAL STABILITY: SOCIAL INSECURITY: ABUSE: ADULT

## 2025-01-02 SDOH — SOCIAL STABILITY: SOCIAL INSECURITY: ARE THERE ANY APPARENT SIGNS OF INJURIES/BEHAVIORS THAT COULD BE RELATED TO ABUSE/NEGLECT?: NO

## 2025-01-02 ASSESSMENT — COGNITIVE AND FUNCTIONAL STATUS - GENERAL
DAILY ACTIVITIY SCORE: 21
MOBILITY SCORE: 20
PATIENT BASELINE BEDBOUND: NO
WALKING IN HOSPITAL ROOM: A LITTLE
MOVING TO AND FROM BED TO CHAIR: A LITTLE
HELP NEEDED FOR BATHING: A LITTLE
TOILETING: A LITTLE
STANDING UP FROM CHAIR USING ARMS: A LITTLE
CLIMB 3 TO 5 STEPS WITH RAILING: A LITTLE
DRESSING REGULAR LOWER BODY CLOTHING: A LITTLE

## 2025-01-02 ASSESSMENT — COLUMBIA-SUICIDE SEVERITY RATING SCALE - C-SSRS
6. HAVE YOU EVER DONE ANYTHING, STARTED TO DO ANYTHING, OR PREPARED TO DO ANYTHING TO END YOUR LIFE?: NO
2. HAVE YOU ACTUALLY HAD ANY THOUGHTS OF KILLING YOURSELF?: NO
1. IN THE PAST MONTH, HAVE YOU WISHED YOU WERE DEAD OR WISHED YOU COULD GO TO SLEEP AND NOT WAKE UP?: NO

## 2025-01-02 ASSESSMENT — ACTIVITIES OF DAILY LIVING (ADL)
WALKS IN HOME: NEEDS ASSISTANCE
TOILETING: NEEDS ASSISTANCE
WALKS IN HOME: NEEDS ASSISTANCE
TOILETING: NEEDS ASSISTANCE
PATIENT'S MEMORY ADEQUATE TO SAFELY COMPLETE DAILY ACTIVITIES?: YES
GROOMING: INDEPENDENT
GROOMING: INDEPENDENT
JUDGMENT_ADEQUATE_SAFELY_COMPLETE_DAILY_ACTIVITIES: YES
HEARING - RIGHT EAR: FUNCTIONAL
FEEDING YOURSELF: INDEPENDENT
BATHING: NEEDS ASSISTANCE
DRESSING YOURSELF: INDEPENDENT
DRESSING YOURSELF: INDEPENDENT
LACK_OF_TRANSPORTATION: NO
BATHING: NEEDS ASSISTANCE
JUDGMENT_ADEQUATE_SAFELY_COMPLETE_DAILY_ACTIVITIES: YES
ADEQUATE_TO_COMPLETE_ADL: YES
ADEQUATE_TO_COMPLETE_ADL: YES
HEARING - LEFT EAR: FUNCTIONAL
LACK_OF_TRANSPORTATION: NO
FEEDING YOURSELF: INDEPENDENT
PATIENT'S MEMORY ADEQUATE TO SAFELY COMPLETE DAILY ACTIVITIES?: YES
HEARING - RIGHT EAR: FUNCTIONAL
HEARING - LEFT EAR: FUNCTIONAL

## 2025-01-02 ASSESSMENT — PAIN SCALES - GENERAL
PAINLEVEL_OUTOF10: 0 - NO PAIN

## 2025-01-02 ASSESSMENT — PATIENT HEALTH QUESTIONNAIRE - PHQ9
2. FEELING DOWN, DEPRESSED OR HOPELESS: NOT AT ALL
SUM OF ALL RESPONSES TO PHQ9 QUESTIONS 1 & 2: 0
1. LITTLE INTEREST OR PLEASURE IN DOING THINGS: NOT AT ALL

## 2025-01-02 ASSESSMENT — LIFESTYLE VARIABLES
HOW OFTEN DO YOU HAVE A DRINK CONTAINING ALCOHOL: NEVER
AUDIT-C TOTAL SCORE: 0
SUBSTANCE_ABUSE_PAST_12_MONTHS: NO
PRESCIPTION_ABUSE_PAST_12_MONTHS: NO
HOW OFTEN DO YOU HAVE 6 OR MORE DRINKS ON ONE OCCASION: NEVER
AUDIT-C TOTAL SCORE: 0
HOW MANY STANDARD DRINKS CONTAINING ALCOHOL DO YOU HAVE ON A TYPICAL DAY: PATIENT DOES NOT DRINK
SKIP TO QUESTIONS 9-10: 1

## 2025-01-02 ASSESSMENT — PAIN - FUNCTIONAL ASSESSMENT
PAIN_FUNCTIONAL_ASSESSMENT: 0-10
PAIN_FUNCTIONAL_ASSESSMENT: 0-10

## 2025-01-02 NOTE — CONSULTS
Inpatient consult to Cardiology  Consult performed by: Eric Rocha DO  Consult ordered by: Abi Saleh DO  Reason for consult: Congestive heart failure        History Of Present Illness:    Sravan Christiansen is a 89 y.o. male with history of mild coronary coronary disease, and prior TAVR for aortic stenosis, heart failure with reduced ejection fraction (EF was 20 to 25%), renal insufficiency, paroxysmal A-fib/flutter (noted on 11/13/2024 echo) presenting with generalized weakness and increasing dyspnea on exertion.  Patient denies any associated chest pain or palpitations or edema.  He is actually feeling better today     Last Recorded Vitals:  Vitals:    01/02/25 0030 01/02/25 0200 01/02/25 0700 01/02/25 0800   BP: 119/87 116/79 120/82    BP Location: Right arm Right arm     Patient Position: Sitting Sitting     Pulse: 77 67 68    Resp: 16 20 11    Temp:  36.6 °C (97.9 °F)  36.8 °C (98.2 °F)   TempSrc:  Temporal     SpO2: 96% 96% 97%    Weight:       Height:           Last Labs:  CBC - 1/2/2025:  4:29 AM  5.6 12.4 134    39.3      CMP - 1/2/2025:  4:29 AM  8.8 6.4 28 --- 1.5   _ 4.1 22 71      PTT - No results in last year.  1.3   14.2 _     Troponin I, High Sensitivity   Date/Time Value Ref Range Status   01/01/2025 10:08  (HH) 0 - 20 ng/L Final     Comment:     Previous result verified on 1/1/2025 1733 on specimen/case [id number] called with component [employer] for procedure Troponin I, High Sensitivity with value 102 ng/L.   01/01/2025 09:00  (HH) 0 - 20 ng/L Final     Comment:     Previous result verified on 1/1/2025 1733 on specimen/case [id number] called with component [employer] for procedure Troponin I, High Sensitivity with value 102 ng/L.   01/01/2025 04:47  (HH) 0 - 20 ng/L Final     BNP   Date/Time Value Ref Range Status   01/01/2025 04:47 PM 2,881 (H) 0 - 99 pg/mL Final   07/22/2024 10:53 AM 1,253 (H) 0 - 99 pg/mL Final     LDL Calculated   Date/Time Value Ref Range Status    05/06/2024 10:04 AM 60 <=99 mg/dL Final     Comment:                                 Near   Borderline      AGE      Desirable  Optimal    High     High     Very High     0-19 Y     0 - 109     ---    110-129   >/= 130     ----    20-24 Y     0 - 119     ---    120-159   >/= 160     ----      >24 Y     0 -  99   100-129  130-159   160-189     >/=190       VLDL   Date/Time Value Ref Range Status   05/06/2024 10:04 AM 13 0 - 40 mg/dL Final   05/04/2023 09:05 AM 19 0 - 40 mg/dL Final      Last I/O:  I/O last 3 completed shifts:  In: 25 (0.4 mL/kg) [P.O.:25]  Out: 1675 (29.5 mL/kg) [Urine:1675 (0.8 mL/kg/hr)]  Weight: 56.7 kg     Past Cardiology Tests (Last 3 Years):  EKG:  A-fib/flutter with no acute ST-T changes    Echo:  Transthoracic echo (TTE) complete 11/13/2024      Transthoracic echo (TTE) limited 04/18/2024          Ejection Fractions:  EF   Date/Time Value Ref Range Status   11/13/2024 12:10 PM 20 %      Cath: 4/2023  Minimal CAD  Stress Test:  No results found for this or any previous visit from the past 1095 days.    Cardiac Imaging:  Chest x-ray  Bilateral basilar airspace consolidations are seen and may represent  scarring, atelectasis and/or pneumonia. No pleural effusion or  pneumothorax is identified    Past Medical History:  He has a past medical history of Abnormal result of other cardiovascular function study, Bicipital tendinitis, left shoulder (07/07/2021), Bicipital tendinitis, right shoulder (03/04/2021), Pain in left hip (10/17/2019), Personal history of malignant neoplasm of prostate, Personal history of other benign neoplasm (06/13/2018), Personal history of other diseases of the circulatory system, Personal history of other diseases of the circulatory system, Personal history of other diseases of the circulatory system, Personal history of other diseases of the circulatory system, and Personal history of other diseases of the respiratory system (12/31/2018).    Past Surgical History:  He  has a past surgical history that includes Ankle arthroscopy w/ open repair (02/18/2016); Coronary angioplasty with stent (02/18/2016); Hernia repair (02/18/2016); Cataract extraction (06/07/2018); Colonoscopy (04/13/2018); and Prostate surgery (04/13/2018).      Social History:  He reports that he has never smoked. He has never used smokeless tobacco. He reports current alcohol use. He reports that he does not use drugs.    Family History:  Family History   Problem Relation Name Age of Onset    Cerebral aneurysm Mother      Heart disease Brother          Allergies:  Penicillins    Inpatient Medications:  Scheduled medications   Medication Dose Route Frequency    aspirin  81 mg oral Daily    dapagliflozin propanediol  10 mg oral Daily    furosemide  20 mg intravenous q12h    heparin (porcine)  5,000 Units subcutaneous q8h    metoprolol succinate XL  25 mg oral Daily    rosuvastatin  5 mg oral Nightly     PRN medications   Medication     Continuous Medications   Medication Dose Last Rate     Outpatient Medications:  Current Outpatient Medications   Medication Instructions    aspirin 81 mg EC tablet 1 tablet, Daily    dapagliflozin propanediol (FARXIGA) 10 mg, oral, Daily    fexofenadine (ALLEGRA) 180 mg, oral, Daily PRN    furosemide (LASIX) 20 mg, oral, Daily    lisinopril 10 mg, oral, Daily    LORazepam (ATIVAN) 0.5 mg, oral, Daily PRN    metoprolol succinate XL (Toprol-XL) 25 mg 24 hr tablet oral, Daily    rosuvastatin (CRESTOR) 5 mg, oral, Daily       Physical Exam:  PHYSICAL EXAM:    Constitutional/General: Elderly male appears much younger than his stated age.  He is alert and oriented.  Well appearing, nontoxic, and in NAD  Neck:  No increased JVP,no carotid bruits.  Respiratory:  Lungs clear to auscultation bilaterally, no wheezes, rales, or rhonchi, not in respiratory distress  Cardiovascular: Heart is irregularly irregular rate/rhythm.  No gallops, or rubs.  1/6 systolic ejection murmur right upper sternal  border.  PMI nondisplaced, 2+ distal pulses  Chest:  No chest wall tenderness  GI:  Abdomen soft, nontender, nondistended, + BS, no organomegaly, no palpable masses, no rebound, guarding, or rigidity  Musculoskeletal:  Moves all extremities x4  Extremities: No peripheral edema  Integument: Skin warm and dry, no rashes  Neurologic:  No focal deficits  Psychiatric:  Normal affect    Vital Signs, Nursing Notes, Allergies, and Medications reviewed by me.     Assessment/Plan   Sravan Christiansen is a 89 y.o. male with history of mild coronary coronary disease, and prior TAVR for aortic stenosis, heart failure with reduced ejection fraction (EF was 20 to 25%), renal insufficiency, paroxysmal A-fib/flutter (noted on 11/2024 echo) presenting with generalized weakness and increasing dyspnea on exertion.  1.  Shortness of breath-patient with no overt signs of volume overload per exam or chest x-ray but does have known severe LV systolic dysfunction and an elevated BNP therefore agree with gentle diuresis.  Will increase his home furosemide to 20 mg twice daily.  2.  Elevated troponin-likely represents a type II MI in the setting of renal insufficiency and heart failure.  Note prior 4/2023 cardiac catheterization with minimal coronary disease  3.  Heart failure with reduced ejection fraction-continue lisinopril/metoprolol succinate/Farxiga and optimize fluid status as above  4.  Renal insufficiency-creatinine is stable  5.  Paroxysmal A-fib/flutter-patient noted to be in A-fib/flutter per EKG on presentation.  Also appeared to be in A-fib/flutter on 11/2024 echo.  Patient is rate controlled on beta-blocker he has a high risk for cardioembolic stroke ([id number]-[id number] score of at least 4) thus start Eliquis (low-dose based on age and renal function)  Peripheral IV 01/01/25 20 G Right Antecubital (Active)   Site Assessment Clean 01/01/25 1647   Dressing Type Transparent 01/01/25 1647   Number of days: 1       Code Status:  Full  Code          Eric Rocha, DO

## 2025-01-02 NOTE — PROGRESS NOTES
Pharmacy Medication History Review    Sravan Christiansen is a 89 y.o. male admitted for Shortness of breath. Pharmacy reviewed the patient's [id number] medications and allergies for accuracy.    The list below reflectives the updated PTA list. Please review each medication in order reconciliation for additional clarification and justification.  Prior to Admission medications    Medication Sig Start Date End Date Authorizing Provider   aspirin 81 mg EC tablet Take 1 tablet (81 mg) by mouth once daily.   Historical Provider, MD   fexofenadine (Allegra) 180 mg tablet Take 1 tablet (180 mg) by mouth once daily as needed.   Historical Provider, MD   furosemide (Lasix) 20 mg tablet Take 1 tablet (20 mg) by mouth once daily.   Adan Cardozo MD   lisinopril 10 mg tablet Take 1 tablet (10 mg) by mouth once daily.   Adan Cardozo MD   rosuvastatin (Crestor) 5 mg tablet Take 1 tablet (5 mg) by mouth once daily.   Adan Cardozo MD   dapagliflozin propanediol (Farxiga) 10 mg Take 1 tablet (10 mg) by mouth once daily.   Adan Cardozo MD   LORazepam (Ativan) 0.5 mg tablet Take 1 tablet (0.5 mg) by mouth once daily at bedtime.   Gerardo Marie,    metoprolol succinate XL (Toprol-XL) 25 mg 24 hr tablet TAKE ONE-HALF (1/2) TABLET DAILY   Adan Cardozo MD        The list below reflectives the updated allergy list. Please review each documented allergy for additional clarification and justification.  Allergies  Reviewed by DAMIAN Pulido-TANVI on 1/1/2025        Severity Reactions Comments    Penicillins Low Rash             Below are additional concerns with the patient's PTA list.      Leny Mack

## 2025-01-02 NOTE — PROGRESS NOTES
Patient was called. Informed of lab results and plan of care. Patient verbalized understanding and denies any questions at this time.     Sravan Christiansen is a 89 y.o. male on day 0 of admission presenting with Shortness of breath.      Subjective   Feels well, sob improved,      Objective     Last Recorded Vitals  /82   Pulse 68   Temp 36.8 °C (98.2 °F)   Resp 11   Wt 56.7 kg (125 lb)   SpO2 97%     Physical Exam    Constitutional: awake, alert, no acute distress  ENMT: mucous membranes moist, conjunctivae clear  Head/Neck: normocephalic, atraumatic; supple, trachea midline  Respiratory/Thorax: bibasilar crackles  Cardiovascular: irregularly irregular, systolic murmur  Gastrointestinal: soft, nondistended, non-tender, bowel sounds appreciated  Extremities: palpable peripheral pulses, no edema  Neurological: AO x3, no focal deficits  Psychological: appropriate mood and behavior  Skin: warm and dry    Assessment/Plan        Acute on chronic HFrEF with EF 20% as of 11/2024  Elevated troponin, likely demand ischemia in s/o above  PAF/flutter     Severe AS s/p TAVR  CKD 3b  CAD, mild by [facility]  HTN  HLD  Prostate cancer  Situational anxiety     Reviewed his outpatient cardiology notes, has had increased fatigue/EDWARDS for quite some time, acutely increased, will continue gentle IV diuresis today, increase home lasix dose at discharge, cardiology consulted  Continue home Farxiga, Metoprolol. Hold home Lisinopril for now while normotensive, gradually resume as able. Strict I&Os, 1.8L fluid restrict, 2g Na.   Cardiology has started Eliquis,  Afib.flutter on EKG and also on ECHO 11/2024  Probable discharge tomorrow    Ras Meyer, DO

## 2025-01-02 NOTE — H&P
Subjective   Sravan Christiansen is a 89 y.o. male who presents for Shortness of Breath.    HPI  This is a 89-year-old male [facility] with ongoing EDWARDS x2 weeks.  He lives at home alone and ambulates independently but has noticed progressively worsening endurance and exertional dyspnea with his ADLs.  He otherwise denies any lightheadedness, chest pain, orthopnea, PND, cough, or LE edema.  Workup thus far notable for BNP 2881.  CXR shows bibasilar airspace consolidations.  D-dimer negative.  Troponins elevated from 102 > 123 > 125.  EKG negative for ischemic changes.    PMH:  Chronic HFrEF with EF 20% as of 11/2024  Severe AS s/p TAVR  CKD 3b  CAD, mild by [facility]  HTN  HLD  Prostate cancer  Situational anxiety    PSH: Ankle repair, cataract surgery, C, colonoscopy, hernia repair, prostate surgery  FH: Cerebral aneurysm, CAD  SH: Former smoker, quit in mid 20s. Occasional alcohol use. Denies illicit drug use. Lives at home alone. . Ambulates independently.    Review of Systems:  12-point ROS was reviewed and is negative, unless otherwise noted in HPI    Objective   Vitals:    01/02/25 0030   BP: 119/87   Pulse: 77   Resp: 16   Temp:    SpO2: 96%       Physical Exam:   Constitutional: awake, alert, no acute distress  ENMT: mucous membranes moist, conjunctivae clear  Head/Neck: normocephalic, atraumatic; supple, trachea midline  Respiratory/Thorax: patent airways, CTAB; no wheezes, rales, or rhonchi  Cardiovascular: RRR, no murmur appreciated  Gastrointestinal: soft, nondistended, non-tender, bowel sounds appreciated  Extremities: palpable peripheral pulses, no edema  Neurological: AO x3, no focal deficits  Psychological: appropriate mood and behavior  Skin: warm and dry    Scheduled Medications:   aspirin, 81 mg, oral, Daily  dapagliflozin propanediol, 10 mg, oral, Daily  furosemide, 20 mg, intravenous, q12h  heparin (porcine), 5,000 Units, subcutaneous, q8h  metoprolol succinate XL, 25  mg, oral, Daily  rosuvastatin, 5 mg, oral, Nightly       Continuous Medications:       PRN Medications:       Assessment/Plan     Acute on chronic HFrEF with EF 20% as of 11/2024  Elevated troponin, likely demand ischemia in s/o above    Severe AS s/p TAVR  CKD 3b  CAD, mild by [facility]  HTN  HLD  Prostate cancer  Situational anxiety    Suspect ADHF is secondary to dietary indiscretion as pt has been eating out more over the holidays. Otherwise reports good adherence to his medications. Currently stable on RA. Already feeling improved following Lasix 40mg IVP in the ED. Will continue with Lasix 20mg IV BID. Continue home Farxiga, Metoprolol. Hold home Lisinopril for now while normotensive, gradually resume as able. Strict I&Os, 1.8L fluid restrict, 2g Na. Consult cardiology.    DVT PPX: Heparin SQ  Code status: FULL - Discussed with pt but would like to discuss further with son.    Abi Saleh, [facility]

## 2025-01-02 NOTE — ED PROVIDER NOTES
EMERGENCY DEPARTMENT ENCOUNTER      Pt Name: Sravan Christiansen  MRN: [id number]  Birthdate 4/30/1930  Date of evaluation: 1/1/2025  Provider: Kennedy Oliveira DO    CHIEF COMPLAINT       Chief Complaint   Patient presents with    Shortness of Breath       HISTORY OF PRESENT ILLNESS    Sravan Christiansen is a 89 y.o. male who presents to the emergency department with Family for increasing shortness of breath.  Patient states symptoms have been ongoing for the past 2 weeks or so.  He does have a history of TAVR with congestive heart failure.  He has been compliant with all of his medications including Lasix.  As his dyspnea has worsened with exertion they are presenting this evening for further evaluation.  Patient denies any associated cough or fevers.  He has had generalized weakness.  But no further associated symptoms at this time.            Nursing Notes were reviewed.    REVIEW OF SYSTEMS     CONSTITUTIONAL: Denies fever, sweats, chills.   NEURO: Endorses generalized weakness.  Denies difficulty walking, numbness, tingling, headache.   HEENT: Denies sore throat, rhinorrhea, changes in vision.   CARDIO: Denies chest pain, palpitations.  PULM: Endorses shortness of breath.Denies cough.   GI: Denies abdominal pain, nausea, vomiting, diarrhea, constipation, melena, hematochezia.  : Denies painful urination, frequency, hematuria.   MSK: Denies recent trauma.   SKIN: Denies rash, lesions.   ENDOCRINE: Denies unexpected weight-loss.   HEME: Denies bleeding disorder.     PAST MEDICAL HISTORY     Past Medical History:   Diagnosis Date    Abnormal result of other cardiovascular function study     Abnormal nuclear stress test    Bicipital tendinitis, left shoulder 07/07/2021    Biceps tendinitis, left    Bicipital tendinitis, right shoulder 03/04/2021    Biceps tendinitis, right    Pain in left hip 10/17/2019    Acute pain of left hip    Personal history of malignant neoplasm of prostate     History of malignant neoplasm  of prostate    Personal history of other benign neoplasm 06/13/2018    History of other benign neoplasm    Personal history of other diseases of the circulatory system     History of cardiac disorder    Personal history of other diseases of the circulatory system     History of essential hypertension    Personal history of other diseases of the circulatory system     History of aortic valve stenosis    Personal history of other diseases of the circulatory system     History of angina pectoris    Personal history of other diseases of the respiratory system 12/31/2018    History of acute bacterial sinusitis       SURGICAL HISTORY       Past Surgical History:   Procedure Laterality Date    ANKLE ARTHROSCOPY W/ OPEN REPAIR  02/18/2016    Ankle Repair    CATARACT EXTRACTION  06/07/2018    Cataract Surgery    COLONOSCOPY  04/13/2018    Colonoscopy    CORONARY ANGIOPLASTY WITH STENT PLACEMENT  02/18/2016    Cath Stent Placement    HERNIA REPAIR  02/18/2016    Hernia Repair    PROSTATE SURGERY  04/13/2018    Prostate Surgery       ALLERGIES     Penicillins    FAMILY HISTORY       Family History   Problem Relation Name Age of Onset    Cerebral aneurysm Mother      Heart disease Brother          SOCIAL HISTORY       Social History     Socioeconomic History    Marital status:    Tobacco Use    Smoking status: Never    Smokeless tobacco: Never   Substance and Sexual Activity    Alcohol use: Yes     Comment: rare    Drug use: Never     Social Drivers of Health     Financial Resource Strain: Low Risk  (1/2/2025)    Overall Financial Resource Strain (CARDIA)     Difficulty of Paying Living Expenses: Not very hard   Food Insecurity: No Food Insecurity (1/2/2025)    Hunger Vital Sign     Worried About Running Out of Food in the Last Year: Never true     Ran Out of Food in the Last Year: Never true   Transportation Needs: No Transportation Needs (1/2/2025)    PRAPARE - Transportation     Lack of Transportation (Medical): No      Lack of Transportation (Non-Medical): No   Stress: No Stress Concern Present (4/19/2023)    [employer] [place] [employer] - Occupational Stress Questionnaire     Feeling of Stress : Not at all   Intimate Partner Violence: Not At Risk (1/2/2025)    Humiliation, Afraid, Rape, and Kick questionnaire     Fear of Current or Ex-Partner: No     Emotionally Abused: No     Physically Abused: No     Sexually Abused: No   Housing Stability: Low Risk  (1/2/2025)    Housing Stability Vital Sign     Unable to Pay for Housing in the Last Year: No     Number of Times Moved in the Last Year: 1     Homeless in the Last Year: No       PHYSICAL EXAM   VS: As documented in the triage note from today's date and EMR flowsheet were reviewed.  Gen: Well developed. No acute distress. Seated in bed. Appears nontoxic.  Alert and oriented to person time place.  Skin: Warm. Dry. Intact. No rashes or lesions.  Eyes: Pupils equally round and reactive to light. Clear sclera. EOMI.  HENT: Atraumatic appearance. Mucosal membranes moist. No oral lesions, uvula midline, airway patent.   CV: Regular rate and regular rhythm. S1, S2.  +1 bilateral pitting pedal edema. Warm extremities.  Resp: Nonlabored breathing rhonchorous bilateral bases clear in the apices. No increased work of breathing.  Saturating appropriately on room air.  GI: Soft and nontender. No rebound or guarding. Bowel sounds x4 present.   MSK: Symmetric muscle bulk. No joint swelling in the extremities. Compartments are soft. Neurovascularly intact x4 extremities. Radial pulses +2 equal bilaterally.  Pedal pulses +2 equal bilaterally.  Neuro: Alert. CN II - XII intact. Speech fluent. Moving all extremities. No focal deficits. Gait normal.  Psych: Appropriate. [place].    DIAGNOSTIC RESULTS   RADIOLOGY:   Non-plain film images such as CT, Ultrasound and MRI are read by the radiologist. Plain radiographic images are visualized and preliminarily interpreted by the emergency  physician with the below findings: Chest x-ray consistent with vascular congestion.      Interpretation per the Radiologist below, if available at the time of this note:    XR chest 2 views   Final Result   Bibasilar airspace consolidations, as above. Clinical correlation and   continued follow-up until clearing is recommended.        MACRO:   None.        Signed by: Shady Boyd 1/1/2025 5:16 PM   Dictation workstation:   [id number]            ED BEDSIDE ULTRASOUND:   Performed by ED Physician - none    LABS:  Labs Reviewed   CBC WITH AUTO DIFFERENTIAL - Abnormal       Result Value    WBC 6.0      nRBC 0.0      RBC 4.16 (*)     Hemoglobin 12.9 (*)     Hematocrit 41.3      MCV 99      MCH 31.0      MCHC 31.2 (*)     RDW 13.5      Platelets 134 (*)     Neutrophils % 71.8      Immature Granulocytes %, Automated 0.3      Lymphocytes % 16.8      Monocytes % 8.6      Eosinophils % 1.7      Basophils % 0.8      Neutrophils Absolute 4.27      Immature Granulocytes Absolute, Automated 0.02      Lymphocytes Absolute 1.00      Monocytes Absolute 0.51      Eosinophils Absolute 0.10      Basophils Absolute 0.05     COMPREHENSIVE METABOLIC PANEL - Abnormal    Glucose 116 (*)     Sodium 141      Potassium 4.8      Chloride 108 (*)     Bicarbonate 23      Anion Gap 15      Urea Nitrogen 40 (*)     Creatinine 1.78 (*)     eGFR 35 (*)     Calcium 9.0      Albumin 4.1      Alkaline Phosphatase 71      Total Protein 6.4      AST 28      Bilirubin, Total 1.5 (*)     ALT 22     PROTIME-INR - Abnormal    Protime 14.2 (*)     INR 1.3 (*)    TROPONIN I, HIGH SENSITIVITY - Abnormal    Troponin I, High Sensitivity 102 (*)     Narrative:     Less than 99th percentile of normal range cutoff-  Female and children under 18 years old <14 ng/L; Male <21 ng/L: Negative  Repeat testing should be performed if clinically indicated.     Female and children under 18 years old 14-50 ng/L; Male 21-50 ng/L:  Consistent with possible cardiac damage and  possible increased clinical   risk. Serial measurements may help to assess extent of myocardial damage.     >50 ng/L: Consistent with cardiac damage, increased clinical risk and  myocardial infarction. Serial measurements may help assess extent of   myocardial damage.      NOTE: Children less than 1 year old may have higher baseline troponin   levels and results should be interpreted in conjunction with the overall   clinical context.     NOTE: Troponin I testing is performed using a different   testing methodology at [facility] than at other   [facility]. Direct result comparisons should only   be made within the same method.   B-TYPE NATRIURETIC PEPTIDE - Abnormal    BNP 2,881 (*)     Narrative:        <100 pg/mL - Heart failure unlikely  100-299 pg/mL - Intermediate probability of acute heart                  failure exacerbation. Correlate with clinical                  context and patient history.    >=300 pg/mL - Heart Failure likely. Correlate with clinical                  context and patient history.    BNP testing is performed using different testing methodology at [facility] than at other [facility]. Direct result comparisons should only be made within the same method.      URINALYSIS WITH REFLEX CULTURE AND MICROSCOPIC - Abnormal    Color, Urine Yellow      Appearance, Urine Clear      Specific Gravity, Urine 1.020      pH, Urine 5.0      Protein, Urine 10 (TRACE)      Glucose, Urine 1000 (4+) (*)     Blood, Urine 0.03 (TRACE) (*)     Ketones, Urine NEGATIVE      Bilirubin, Urine NEGATIVE      Urobilinogen, Urine NORM      Nitrite, Urine NEGATIVE      Leukocyte Esterase, Urine NEGATIVE     TROPONIN I, HIGH SENSITIVITY - Abnormal    Troponin I, High Sensitivity 123 (*)     Narrative:     Less than 99th percentile of normal range cutoff-  Female and children under 18 years old <14 ng/L; Male <21 ng/L: Negative  Repeat testing should be performed if clinically  indicated.     Female and children under 18 years old 14-50 ng/L; Male 21-50 ng/L:  Consistent with possible cardiac damage and possible increased clinical   risk. Serial measurements may help to assess extent of myocardial damage.     >50 ng/L: Consistent with cardiac damage, increased clinical risk and  myocardial infarction. Serial measurements may help assess extent of   myocardial damage.      NOTE: Children less than 1 year old may have higher baseline troponin   levels and results should be interpreted in conjunction with the overall   clinical context.     NOTE: Troponin I testing is performed using a different   testing methodology at [facility] than at other   [facility]. Direct result comparisons should only   be made within the same method.   TROPONIN I, HIGH SENSITIVITY - Abnormal    Troponin I, High Sensitivity 125 (*)     Narrative:     Less than 99th percentile of normal range cutoff-  Female and children under 18 years old <14 ng/L; Male <21 ng/L: Negative  Repeat testing should be performed if clinically indicated.     Female and children under 18 years old 14-50 ng/L; Male 21-50 ng/L:  Consistent with possible cardiac damage and possible increased clinical   risk. Serial measurements may help to assess extent of myocardial damage.     >50 ng/L: Consistent with cardiac damage, increased clinical risk and  myocardial infarction. Serial measurements may help assess extent of   myocardial damage.      NOTE: Children less than 1 year old may have higher baseline troponin   levels and results should be interpreted in conjunction with the overall   clinical context.     NOTE: Troponin I testing is performed using a different   testing methodology at [facility] than at other   [facility]. Direct result comparisons should only   be made within the same method.   BASIC METABOLIC PANEL - Abnormal    Glucose 101 (*)     Sodium 142      Potassium 4.2      Chloride 107       Bicarbonate 24      Anion Gap 15      Urea Nitrogen 40 (*)     Creatinine 1.72 (*)     eGFR 36 (*)     Calcium 8.8     CBC - Abnormal    WBC 5.6      nRBC 0.0      RBC 3.97 (*)     Hemoglobin 12.4 (*)     Hematocrit 39.3 (*)     MCV 99      MCH 31.2      MCHC 31.6 (*)     RDW 13.4      Platelets 134 (*)    BASIC METABOLIC PANEL - Abnormal    Glucose 103 (*)     Sodium 143      Potassium 4.1      Chloride 104      Bicarbonate 26      Anion Gap 17      Urea Nitrogen 41 (*)     Creatinine 1.77 (*)     eGFR 35 (*)     Calcium 9.3     CBC - Abnormal    WBC 6.1      nRBC 0.0      RBC 4.32 (*)     Hemoglobin 13.7      Hematocrit 42.1      MCV 98      MCH 31.7      MCHC 32.5      RDW 13.5      Platelets 145 (*)    MAGNESIUM - Normal    Magnesium 2.36     D-DIMER, VTE EXCLUSION - Normal    D-Dimer, Quantitative VTE Exclusion 355      Narrative:     The VTE Exclusion D-Dimer assay is reported in ng/mL Fibrinogen Equivalent Units (FEU).    Per 's instructions for use, a value of less than 500 ng/mL (FEU) may help to exclude DVT or PE in outpatients when the assay is used with a clinical pretest probability assessment.(AEMR must utilize and document eCalc 'Wells Score Deep Vein Thrombosis Risk' for DVT exclusion only. Emergency Department should utilize  Guidelines for Emergency Department Use of the VTE Exclusion D-Dimer and Clinical Pretest probability assessment model for DVT or PE exclusion.)   SARS-COV-2 AND INFLUENZA A/B PCR - Normal    Flu A Result Not Detected      Flu B Result Not Detected      Coronavirus 2019, PCR Not Detected      Narrative:     This assay has received FDA Emergency Use Authorization (EUA) and  is only authorized for the duration of time that circumstances exist to justify the authorization of the emergency use of in vitro diagnostic tests for the detection of SARS-CoV-2 virus and/or diagnosis of COVID-19 infection under section 564(b)(1) of the Act, 21 U.S.C. 360bbb-3(b)(1).  Testing for SARS-CoV-2 is only recommended for patients who meet current clinical and/or epidemiological criteria as defined by federal, state, or local public health directives. This assay is an in vitro diagnostic nucleic acid amplification test for the qualitative detection of SARS-CoV-2, Influenza A, and Influenza B from nasopharyngeal specimens and has been validated for use at [facility]. Negative results do not preclude COVID-19 infections or Influenza A/B infections, and should not be used as the sole basis for diagnosis, treatment, or other management decisions. If Influenza A/B and RSV PCR results are negative, testing for Parainfluenza virus, Adenovirus and Metapneumovirus is routinely performed for [facility] and intensive care inpatients, and is available on other patients by placing an add-on request.    MAGNESIUM - Normal    Magnesium 2.31     MAGNESIUM - Normal    Magnesium 2.40     URINALYSIS MICROSCOPIC WITH REFLEX CULTURE - Normal    WBC, Urine 1-5      RBC, Urine NONE     URINALYSIS WITH REFLEX CULTURE AND MICROSCOPIC    Narrative:     The following orders were created for panel order Urinalysis with Reflex Culture and Microscopic.  Procedure                               Abnormality         Status                     ---------                               -----------         ------                     Urinalysis with Reflex C...[[id number]]  Abnormal            Final result               Extra Urine Gray Tube[[id number]]                            Final result                 Please view results for these tests on the individual orders.   EXTRA URINE GRAY TUBE    Extra Tube Hold for add-ons.         All other labs were within normal range or not returned as of this dictation.    EMERGENCY DEPARTMENT COURSE/MDM:   Vitals:    Vitals:    01/02/25 1838 01/02/25 1925 01/03/25 0418 01/03/25 1143   BP: 130/79 127/83 105/67 93/57   BP Location:       Patient Position:        Pulse: 68 67 67 69   Resp:       Temp: 35.9 °C (96.6 °F) 35.8 °C (96.4 °F) 36.3 °C (97.3 °F) 35.9 °C (96.6 °F)   TempSrc:  Temporal Temporal    SpO2: 97% 98% 93% 95%   Weight:       Height:           I reviewed the patient's triage vitals and they are hypertensive recommend follow-up with primary physician for repeat checks.  Saturating appropriately on room air.    Due to the above findings the following was ordered cardiac workup to include viral swabs.    Lab work reveals flu and COVID swabs negative.  Patient has no significant electrolyte derangements.  Renal function does show mild JACINTA we will hold fluids at this time as I do believe this may be heart failure exacerbation.  No leukocytosis no evidence of infectious etiology on clinical examination low concern for pneumonia.  Anemia is improved from baseline.  BNP is grossly elevated clinically I do have a concern for heart failure.  EKG shows no acute injury pattern or dysrhythmias.  Patient is diuresed with IV Lasix no indication for supplemental oxygen at this time patient is resting comfortably.  He is agreeable with hospital admission for continued workup.  He has been compliant with his anticoagulation I have a low concern for PE.  Cardiac troponin is elevated although notes significant delta change I did give full dose aspirin I see no indication for heparinization at this time I do believe this is likely demand in nature.  Patient is appreciative of care agreeable with hospital admission.  I spoke with the admitting hospitalist who is agreed to accept the patient she did take over care at time of admission order.    Interpreted by the Emergency Department Attending: ECG revealed atrial flutter at a rate of 81 beats per minute with OH interval * , QRS of 122, QTc of 460 no acute injury pattern.      Diagnoses as of 01/03/25 2017   Shortness of breath   Acute on chronic congestive heart failure, unspecified heart failure type   Elevated troponin        Patient was counseled regarding labs, imaging, likely diagnosis, and plan. All questions were answered.     ------------------------------------------------------------------  Information provided by the patient and family  Consults hospitalist  Past medical history complicating workup heart failure  Previous medical records reviewed office visit 12/27/2024  Considered CTA although no indication on anticoagulation low concern for PE  Shared medical decision making patient agreeable with hospital admission for continued workup and treatment.  ------------------------------------------------------------------  ED Medications administered this visit:    Medications   furosemide (Lasix) injection 40 mg (40 mg intravenous Given 1/1/25 2233)   aspirin chewable tablet 324 mg (324 mg oral Given 1/1/25 2233)        Final Impression:   1. Shortness of breath    2. Acute on chronic congestive heart failure, unspecified heart failure type    3. Elevated troponin          Kennedy Oliveira DO    (Please note that portions of this note were completed with a voice recognition program.  Efforts were made to edit the dictations but occasionally words are mis-transcribed.)     Kennedy Oliveira DO  01/03/25 2024

## 2025-01-03 ENCOUNTER — APPOINTMENT (OUTPATIENT)
Dept: CARDIOLOGY | Facility: HOSPITAL | Age: OVER 89
End: 2025-01-03
Payer: MEDICARE

## 2025-01-03 ENCOUNTER — APPOINTMENT (OUTPATIENT)
Dept: PRIMARY CARE | Facility: CLINIC | Age: OVER 89
End: 2025-01-03
Payer: MEDICARE

## 2025-01-03 VITALS
HEART RATE: 69 BPM | RESPIRATION RATE: 20 BRPM | HEIGHT: 63 IN | WEIGHT: 125 LBS | BODY MASS INDEX: 22.15 KG/M2 | DIASTOLIC BLOOD PRESSURE: 57 MMHG | SYSTOLIC BLOOD PRESSURE: 93 MMHG | OXYGEN SATURATION: 95 % | TEMPERATURE: 96.6 F

## 2025-01-03 PROBLEM — R06.02 SHORTNESS OF BREATH: Status: RESOLVED | Noted: 2025-01-02 | Resolved: 2025-01-03

## 2025-01-03 LAB
ANION GAP SERPL CALC-SCNC: 17 MMOL/L (ref 10–20)
BUN SERPL-MCNC: 41 MG/DL (ref 6–23)
CALCIUM SERPL-MCNC: 9.3 MG/DL (ref 8.6–10.3)
CHLORIDE SERPL-SCNC: 104 MMOL/L (ref 98–107)
CO2 SERPL-SCNC: 26 MMOL/L (ref 21–32)
CREAT SERPL-MCNC: 1.77 MG/DL (ref 0.5–1.3)
EGFRCR SERPLBLD CKD-EPI 2021: 35 ML/MIN/1.73M*2
ERYTHROCYTE [DISTWIDTH] IN BLOOD BY AUTOMATED COUNT: 13.5 % (ref 11.5–14.5)
GLUCOSE SERPL-MCNC: 103 MG/DL (ref 74–99)
HCT VFR BLD AUTO: 42.1 % (ref 41–52)
HGB BLD-MCNC: 13.7 G/DL (ref 13.5–17.5)
MAGNESIUM SERPL-MCNC: 2.4 MG/DL (ref 1.6–2.4)
MCH RBC QN AUTO: 31.7 PG (ref 26–34)
MCHC RBC AUTO-ENTMCNC: 32.5 G/DL (ref 32–36)
MCV RBC AUTO: 98 FL (ref 80–100)
NRBC BLD-RTO: 0 /100 WBCS (ref 0–0)
PLATELET # BLD AUTO: 145 X10*3/UL (ref 150–450)
POTASSIUM SERPL-SCNC: 4.1 MMOL/L (ref 3.5–5.3)
RBC # BLD AUTO: 4.32 X10*6/UL (ref 4.5–5.9)
SODIUM SERPL-SCNC: 143 MMOL/L (ref 136–145)
WBC # BLD AUTO: 6.1 X10*3/UL (ref 4.4–11.3)

## 2025-01-03 PROCEDURE — 99239 HOSP IP/OBS DSCHRG MGMT >30: CPT | Performed by: STUDENT IN AN ORGANIZED HEALTH CARE EDUCATION/TRAINING PROGRAM

## 2025-01-03 PROCEDURE — 2500000001 HC RX 250 WO HCPCS SELF ADMINISTERED DRUGS (ALT 637 FOR MEDICARE OP): Performed by: INTERNAL MEDICINE

## 2025-01-03 PROCEDURE — 93005 ELECTROCARDIOGRAM TRACING: CPT

## 2025-01-03 PROCEDURE — 80048 BASIC METABOLIC PNL TOTAL CA: CPT | Performed by: STUDENT IN AN ORGANIZED HEALTH CARE EDUCATION/TRAINING PROGRAM

## 2025-01-03 PROCEDURE — 85027 COMPLETE CBC AUTOMATED: CPT | Performed by: STUDENT IN AN ORGANIZED HEALTH CARE EDUCATION/TRAINING PROGRAM

## 2025-01-03 PROCEDURE — 2500000002 HC RX 250 W HCPCS SELF ADMINISTERED DRUGS (ALT 637 FOR MEDICARE OP, ALT 636 FOR OP/ED): Performed by: STUDENT IN AN ORGANIZED HEALTH CARE EDUCATION/TRAINING PROGRAM

## 2025-01-03 PROCEDURE — 2500000001 HC RX 250 WO HCPCS SELF ADMINISTERED DRUGS (ALT 637 FOR MEDICARE OP): Performed by: STUDENT IN AN ORGANIZED HEALTH CARE EDUCATION/TRAINING PROGRAM

## 2025-01-03 PROCEDURE — 83735 ASSAY OF MAGNESIUM: CPT | Performed by: STUDENT IN AN ORGANIZED HEALTH CARE EDUCATION/TRAINING PROGRAM

## 2025-01-03 PROCEDURE — 36415 COLL VENOUS BLD VENIPUNCTURE: CPT | Performed by: STUDENT IN AN ORGANIZED HEALTH CARE EDUCATION/TRAINING PROGRAM

## 2025-01-03 RX ORDER — FUROSEMIDE 20 MG/1
20 TABLET ORAL
Qty: 60 TABLET | Refills: 3 | Status: SHIPPED | OUTPATIENT
Start: 2025-01-03 | End: 2025-01-09 | Stop reason: SDUPTHER

## 2025-01-03 RX ORDER — METOPROLOL SUCCINATE 25 MG/1
25 TABLET, EXTENDED RELEASE ORAL DAILY
Qty: 30 TABLET | Refills: 3 | Status: SHIPPED | OUTPATIENT
Start: 2025-01-04 | End: 2025-01-09 | Stop reason: SDUPTHER

## 2025-01-03 RX ADMIN — DAPAGLIFLOZIN 10 MG: 10 TABLET, FILM COATED ORAL at 08:12

## 2025-01-03 RX ADMIN — FUROSEMIDE 40 MG: 40 TABLET ORAL at 08:12

## 2025-01-03 RX ADMIN — APIXABAN 2.5 MG: 5 TABLET, FILM COATED ORAL at 08:12

## 2025-01-03 RX ADMIN — ROSUVASTATIN CALCIUM 5 MG: 10 TABLET, FILM COATED ORAL at 08:12

## 2025-01-03 RX ADMIN — METOPROLOL SUCCINATE 25 MG: 25 TABLET, EXTENDED RELEASE ORAL at 08:12

## 2025-01-03 ASSESSMENT — PAIN SCALES - GENERAL: PAINLEVEL_OUTOF10: 0 - NO PAIN

## 2025-01-03 NOTE — CARE PLAN
The patient's goals for the shift include rest, feel better    The clinical goals for the shift include rest, safety, hemodynamic stability    Over the shift, the patient did make progress toward the following goals.

## 2025-01-03 NOTE — PROGRESS NOTES
25 0953   Discharge Planning   Living Arrangements Alone;Other (Comment)   Support Systems Children   Assistance Needed information on [facility] office on Aging provided to patient for future housing planning   Type of Residence Private residence   Number of Stairs to Enter Residence 2   Number of Stairs Within Residence 0   Do you have animals or pets at home? No   Home or Post Acute Services None   Expected Discharge Disposition Home   Does the patient need discharge transport arranged? No  (patient stated that his son will transport him but that he does drive also)     This MSW met with patient in his room. He appeared alert and oriented x3 and provided name and  and verified demographic information. Patient stated that he resides home alone and does not feel he needs [facility] for discharge. He stated that he is contemplating finding alternate housing in the future such as senior housing so he has more socialization. He denied any additional needs and was receptive to information provided on [facility] Office on Aging for future planning. He denied need for any home delivered meal programs and stated he tried them in the past and did not care for them. He reported that he is independent with all ADLs and IADLs and does drive. Patient reported no falls in last 3 months and no assistive devices or O2 supplies used at home. He stated that he uses [facility] [place] and [place] for some rx and [facility] for regular /ongoing medications. He noted that his primary PCP is Dr. Gerardo Strickland (sp? Unknown by patient)and Dr. Cardozo for cardiology. Medical [employer] in place. No additional needs identified at this time. TCC updated on possible discharge today noted by physician who visited with patient just prior to this MSW.

## 2025-01-03 NOTE — NURSING NOTE
Met with patient at the bedside. Discharge Planning discussed. AVS updated with follow-ups, education, and medication information. Verified/ entered a PCP and pharmacy. New medications and side effects discussed. All questions answered.  Updated nurse on this info. AVS printed and hi-lighted. IV and tele removed. Waiting on son.

## 2025-01-03 NOTE — DISCHARGE INSTRUCTIONS
START TAKING YOUR LASIX (FUROSEMIDE) 20MG TWICE DAILY    STOP TAKING A BABY ASPIRIN, INSTEAD START TAKING NEW BLOOD THINNER ELIQUIS 2.5 MG TWICE DAILY    START TAKING A FULL TABLET OF METOPROLOL 25MG DAILY INSTEAD OF HALF A TABLET

## 2025-01-03 NOTE — PROGRESS NOTES
Occupational Therapy                 Therapy Communication Note    Patient Name: Sravan Christiansen  MRN: [id number]  Department: [facility]  Room: 60/601-A  Today's Date: 1/3/2025     Discipline: Occupational Therapy      (OT screen completed. patient performing mobility and ADLs in his room without AD independently. no LOB noted. d/c OT orders. No skilled OT needs)

## 2025-01-03 NOTE — DISCHARGE SUMMARY
Discharge Diagnosis  Shortness of breath    Discharge Meds     Medication List      CHANGE how you take these medications     furosemide 20 mg tablet; Commonly known as: Lasix; Take 1 tablet (20 mg)   by mouth 2 times daily (morning and late afternoon).; What changed: when   to take this     CONTINUE taking these medications     aspirin 81 mg EC tablet   dapagliflozin propanediol 10 mg; Commonly known as: Farxiga; Take 1   tablet (10 mg) by mouth once daily.   fexofenadine 180 mg tablet; Commonly known as: Allegra   lisinopril 10 mg tablet; Take 1 tablet (10 mg) by mouth once daily.   LORazepam 0.5 mg tablet; Commonly known as: Ativan; Take 1 tablet (0.5   mg) by mouth once daily as needed for anxiety.   metoprolol succinate XL 25 mg 24 hr tablet; Commonly known as:   Toprol-XL; TAKE ONE-HALF (1/2) TABLET DAILY   rosuvastatin 5 mg tablet; Commonly known as: Crestor; Take 1 tablet (5   mg) by mouth once daily.     =    Hospital Course   89-year-old male with PMHx as below presented to [facility] on 1/1/2025 with increased EDWARDS beyond baseline. He was found to be in acute on chronic systolic CHF. He was diuresed with IV lasix and cardiology consulted. He rapidly improved with IV diuresis and his lasix dose was doubled at discharge from 20mg daily to 20mg BID. He was also found to be atrial fib/flutter. He was started on Eliquis and his toprol XL was changed from 12.5mg daily to 25mg daily. Pt discharged home in stable condition and will follow up closely with his usual cardiologist.   35 min    PMH:  Chronic HFrEF with EF 20% as of 11/2024  Severe AS s/p TAVR  CKD 3b  CAD, mild by [facility]  HTN  HLD  Prostate cancer  Situational anxiety    Pertinent Physical Exam At Time of Discharge  Physical Exam        Outpatient Follow-Up  Future Appointments   Date Time Provider Department Center   4/7/2025 11:00 AM DAMIAN Sheth-CNP [id number] [place]   5/9/2025  9:30 AM Gerardo Marie DO [id number] [place]   5/15/2025  9:45  AM Adan Cardozo MD [id number] [place]         Ras Meyer, DO

## 2025-01-03 NOTE — PROGRESS NOTES
Physical Therapy                 Therapy Communication Note    Patient Name: Sravan Christiansen  MRN: [id number]  Department: [facility]  Room: 60/601-A  Today's Date: 1/3/2025     Discipline: Physical Therapy          PT screen completed. patient performing mobility and ADLs in his room without AD independently. no LOB noted. d/c PT orders. No skilled PT needs

## 2025-01-06 ENCOUNTER — PATIENT OUTREACH (OUTPATIENT)
Dept: PRIMARY CARE | Facility: CLINIC | Age: OVER 89
End: 2025-01-06
Payer: MEDICARE

## 2025-01-06 LAB
ATRIAL RATE: 276 BPM
P AXIS: 92 DEGREES
P OFFSET: 131 MS
P ONSET: 92 MS
Q ONSET: 196 MS
QRS COUNT: 11 BEATS
QRS DURATION: 124 MS
QT INTERVAL: 418 MS
QTC CALCULATION(BAZETT): 447 MS
QTC FREDERICIA: 438 MS
R AXIS: -34 DEGREES
T AXIS: 92 DEGREES
T OFFSET: 405 MS
VENTRICULAR RATE: 69 BPM

## 2025-01-06 NOTE — PROGRESS NOTES
Discharge Facility:[facility]  Discharge Diagnosis:SOB  Admission Date:1/1/2025  Discharge Date: 1/3/2025    PCP Appointment Date:TBD  Specialist Appointment Date:   1/9/2025 [facility] Encounter and Summary Linked: Yes  See discharge assessment below for further details  Engagement  Call Start Time: 1450 (1/6/2025  5:42 PM)    Medications  Medications reviewed with patient/caregiver?: Yes (1/6/2025  5:42 PM)  Is the patient having any side effects they believe may be caused by any medication additions or changes?: No (1/6/2025  5:42 PM)  Does the patient have all medications ordered at discharge?: Yes (1/6/2025  5:42 PM)  Care Management Interventions: No intervention needed (1/6/2025  5:42 PM)  Prescription Comments: -- (New: Eliquis 2.5 mg one bid D/C ASA, Change: Lasix 20 mg one bid) (1/6/2025  5:42 PM)  Is the patient taking all medications as directed (includes completed medication regime)?: Yes (1/6/2025  5:42 PM)  Medication Comments: -- (Sravan verbalized understanding of medications, states feels much better with twice daily Lasix) (1/6/2025  5:42 PM)    Appointments  Does the patient have a primary care provider?: Yes (1/6/2025  5:42 PM)  Care Management Interventions: -- (Message sent to staff to schedule follow up) (1/6/2025  5:42 PM)  Has the patient kept scheduled appointments due by today?: Yes (1/6/2025  5:42 PM)    Self Management  What is the home health agency?: -- (N/A) (1/6/2025  5:42 PM)  What Durable Medical Equipment (DME) was ordered?: -- (N/A) (1/6/2025  5:42 PM)    Patient Teaching  Does the patient have access to their discharge instructions?: Yes (1/6/2025  5:42 PM)  Care Management Interventions: Reviewed instructions with patient (To do daily weights, Sravan understands and will keep track) (1/6/2025  5:42 PM)  What is the patient's perception of their health status since discharge?: Improving (1/6/2025  5:42 PM)  Is the patient/caregiver able to teach back the hierarchy of who  to call/visit for symptoms/problems? PCP, Specialist, Home Health nurse, Urgent Care, ED, 911: Yes (1/6/2025  5:42 PM)    Wrap Up  Wrap Up Additional Comments: -- (Sravan is feeling much better now. He will fu with Cardion and PCP.) (1/6/2025  5:42 PM)

## 2025-01-09 ENCOUNTER — OFFICE VISIT (OUTPATIENT)
Dept: CARDIOLOGY | Facility: CLINIC | Age: OVER 89
End: 2025-01-09
Payer: MEDICARE

## 2025-01-09 ENCOUNTER — TELEPHONE (OUTPATIENT)
Dept: CARDIOLOGY | Facility: CLINIC | Age: OVER 89
End: 2025-01-09

## 2025-01-09 VITALS
BODY MASS INDEX: 21.26 KG/M2 | DIASTOLIC BLOOD PRESSURE: 72 MMHG | SYSTOLIC BLOOD PRESSURE: 106 MMHG | WEIGHT: 120 LBS | HEART RATE: 66 BPM

## 2025-01-09 DIAGNOSIS — I71.21 ANEURYSM OF ASCENDING AORTA WITHOUT RUPTURE (CMS-HCC): ICD-10-CM

## 2025-01-09 DIAGNOSIS — I10 ESSENTIAL HYPERTENSION: ICD-10-CM

## 2025-01-09 DIAGNOSIS — I25.118 CORONARY ARTERY DISEASE OF NATIVE ARTERY OF NATIVE HEART WITH STABLE ANGINA PECTORIS: ICD-10-CM

## 2025-01-09 DIAGNOSIS — Z95.2 S/P TAVR (TRANSCATHETER AORTIC VALVE REPLACEMENT): ICD-10-CM

## 2025-01-09 DIAGNOSIS — I25.10 CORONARY ARTERY DISEASE INVOLVING NATIVE CORONARY ARTERY OF NATIVE HEART WITHOUT ANGINA PECTORIS: ICD-10-CM

## 2025-01-09 DIAGNOSIS — E78.2 HYPERLIPIDEMIA, MIXED: ICD-10-CM

## 2025-01-09 DIAGNOSIS — I50.43 ACUTE ON CHRONIC COMBINED SYSTOLIC (CONGESTIVE) AND DIASTOLIC (CONGESTIVE) HEART FAILURE: ICD-10-CM

## 2025-01-09 DIAGNOSIS — Z95.5 PRESENCE OF STENT IN CORONARY ARTERY: ICD-10-CM

## 2025-01-09 DIAGNOSIS — I48.91 NEW ONSET ATRIAL FIBRILLATION (MULTI): Primary | ICD-10-CM

## 2025-01-09 DIAGNOSIS — I50.42 CHRONIC COMBINED SYSTOLIC AND DIASTOLIC HF (HEART FAILURE), NYHA CLASS 1: ICD-10-CM

## 2025-01-09 DIAGNOSIS — I48.92 ATRIAL FLUTTER, UNSPECIFIED TYPE (MULTI): ICD-10-CM

## 2025-01-09 DIAGNOSIS — I12.0 BENIGN HYPERTENSIVE KIDNEY DISEASE WITH CHRONIC KIDNEY DISEASE STAGE V OR END STAGE RENAL DISEASE (MULTI): ICD-10-CM

## 2025-01-09 DIAGNOSIS — R00.2 PALPITATIONS: ICD-10-CM

## 2025-01-09 PROCEDURE — 1159F MED LIST DOCD IN RCRD: CPT

## 2025-01-09 PROCEDURE — 3078F DIAST BP <80 MM HG: CPT

## 2025-01-09 PROCEDURE — 1160F RVW MEDS BY RX/DR IN RCRD: CPT

## 2025-01-09 PROCEDURE — 1157F ADVNC CARE PLAN IN RCRD: CPT

## 2025-01-09 PROCEDURE — 3074F SYST BP LT 130 MM HG: CPT

## 2025-01-09 PROCEDURE — 1111F DSCHRG MED/CURRENT MED MERGE: CPT

## 2025-01-09 PROCEDURE — 99205 OFFICE O/P NEW HI 60 MIN: CPT

## 2025-01-09 RX ORDER — METOPROLOL SUCCINATE 25 MG/1
25 TABLET, EXTENDED RELEASE ORAL DAILY
Qty: 90 TABLET | Refills: 3 | Status: SHIPPED | OUTPATIENT
Start: 2025-01-09 | End: 2026-01-09

## 2025-01-09 RX ORDER — FUROSEMIDE 20 MG/1
20 TABLET ORAL
Qty: 180 TABLET | Refills: 3 | Status: SHIPPED | OUTPATIENT
Start: 2025-01-09 | End: 2026-01-09

## 2025-01-09 NOTE — TELEPHONE ENCOUNTER
Patient calling regarding medication clarification.  Patient states that on his medication list it states that he is taking Allegra 180 mg tablet.  States that he has never taken this medication.  Patient was seen in office today by Dorothy.    Please advise

## 2025-01-09 NOTE — PATIENT INSTRUCTIONS
Please STOP taking Lisinopril. Your blood pressure have been on the lower side.    Will have you see electrophysiology specialist Dr. Ramicone to discuss possible need for defibrillator placement.    Referral to advanced heart failure specialist Dr. Valdez.    Follow up with Dr. Cardozo in May as scheduled.

## 2025-01-09 NOTE — PROGRESS NOTES
Chief Complaint:   Hospital follow-up     History Of Present Illness:    Sravan Christiansen is a 89 y.o. male with PMHx of CAD mild, aortic valve stenosis s/p TAVR (6/5/23), systolic HFrEF (EF 20-25%), HTN, HLD, bradycardia, CKD 3b, prostate cancer, and past hx of smoking presenting today for follow-up after recent hospitalization for acute CHF exacerbation requiring IV diuresis. Patient reports that he is feeling good since being discharged from the hospital and his shortness of breath is much improved. He reports his weight has been stable and he remains around 120lbs. He reports he has been working on monitoring his sodium and fluid intake. Patient denies any CP, SOB, palpitations, lightheadedness, dizziness, syncope, orthopnea, PND, lower extremity edema.     Last Recorded Vitals:  Vitals:    01/09/25 1126   BP: 106/72   BP Location: Left arm   Patient Position: Sitting   Pulse: 66   Weight: 54.4 kg (120 lb)     Past Medical History:  He has a past medical history of Abnormal result of other cardiovascular function study, Bicipital tendinitis, left shoulder (07/07/2021), Bicipital tendinitis, right shoulder (03/04/2021), Pain in left hip (10/17/2019), Personal history of malignant neoplasm of prostate, Personal history of other benign neoplasm (06/13/2018), Personal history of other diseases of the circulatory system, Personal history of other diseases of the circulatory system, Personal history of other diseases of the circulatory system, Personal history of other diseases of the circulatory system, and Personal history of other diseases of the respiratory system (12/31/2018).    Past Surgical History:  He has a past surgical history that includes Ankle arthroscopy w/ open repair (02/18/2016); Coronary angioplasty with stent (02/18/2016); Hernia repair (02/18/2016); Cataract extraction (06/07/2018); Colonoscopy (04/13/2018); and Prostate surgery (04/13/2018).      Social History:  He reports that he has never  smoked. He has never used smokeless tobacco. He reports current alcohol use. He reports that he does not use drugs.    Family History:  Family History   Problem Relation Name Age of Onset    Cerebral aneurysm Mother      Heart disease Brother       Allergies:  Penicillins    Outpatient Medications:  Current Outpatient Medications   Medication Instructions    apixaban (ELIQUIS) 2.5 mg, oral, 2 times daily    dapagliflozin propanediol (FARXIGA) 10 mg, oral, Daily    fexofenadine (ALLEGRA) 180 mg, Daily PRN    furosemide (LASIX) 20 mg, oral, 2 times daily (morning and late afternoon)    lisinopril 10 mg, oral, Daily    LORazepam (ATIVAN) 0.5 mg, oral, Daily PRN    metoprolol succinate XL (TOPROL-XL) 25 mg, oral, Daily, Do not crush or chew.    rosuvastatin (CRESTOR) 5 mg, oral, Daily     Physical Exam:  General: no acute distress  HEENT: EOMI, no scleral icterus.  Lungs: Clear to auscultation bilaterally without wheezing, rales, or rhonchi.  Cardiovascular: Regular rhythm and rate. Normal S1 and S2. No murmurs, rubs, or gallops are appreciated. JVP normal.  Abdomen: Soft, nontender, nondistended. Bowel sounds present.  Extremities: Warm and well perfused with equal 2+ pulses bilaterally.  No edema.  Neurologic: Alert and oriented x3.      Last Labs:  CBC -  Lab Results   Component Value Date    WBC 6.1 01/03/2025    HGB 13.7 01/03/2025    HCT 42.1 01/03/2025    MCV 98 01/03/2025     (L) 01/03/2025     CMP -  Lab Results   Component Value Date    CALCIUM 9.3 01/03/2025    PHOS CANCELED 06/17/2023    PROT 6.4 01/01/2025    ALBUMIN 4.1 01/01/2025    AST 28 01/01/2025    ALT 22 01/01/2025    ALKPHOS 71 01/01/2025    BILITOT 1.5 (H) 01/01/2025     LIPID PANEL -   Lab Results   Component Value Date    CHOL 130 05/06/2024    TRIG 67 05/06/2024    HDL 56.9 05/06/2024    CHHDL 2.3 05/06/2024    LDLF 46 05/04/2023    VLDL 13 05/06/2024    NHDL 73 05/06/2024     RENAL FUNCTION PANEL -   Lab Results   Component Value Date     GLUCOSE 103 (H) 01/03/2025     01/03/2025    K 4.1 01/03/2025     01/03/2025    CO2 26 01/03/2025    ANIONGAP 17 01/03/2025    BUN 41 (H) 01/03/2025    CREATININE 1.77 (H) 01/03/2025    GFRMALE 39 (A) 06/20/2023    CALCIUM 9.3 01/03/2025    PHOS CANCELED 06/17/2023    ALBUMIN 4.1 01/01/2025      Lab Results   Component Value Date    BNP 2,881 (H) 01/01/2025     Last Cardiology Tests:  ECG:  EKG 01/03/2025   A-flutter with 4:1 AV conduction    Echo:  Transthoracic echo (TTE) complete 11/13/2024   1. Left ventricular ejection fraction is severely decreased, by visual estimate at 20%.   2. There is global hypokinesis of the left ventricle with minor regional variations.   3. Left ventricular diastolic filling is indeterminate.   4. Left ventricular cavity size is mildly dilated.   5. There is moderately increased left ventricular hypertrophy.   6. There is moderately reduced right ventricular systolic function.   7. Moderately enlarged right ventricle.   8. The left atrium is moderately dilated.   9. The mitral valve is moderately thickened.  10. Bioprosthetic aortic valve (TAVR) with normal hemodynamics and a mild perivalvular leak.  11. Mild aortic valve regurgitation.  12. Severe apical hypokinesis.    TTE 4/18/2024   1. Left ventricular systolic function is severely decreased with a 15-20% estimated ejection fraction.   2. There is moderately reduced right ventricular systolic function.   3. The left atrium is moderately dilated.   4. The right atrium is moderately dilated.   5. Mild to moderate mitral valve regurgitation.   6. Mildly elevated RVSP.   7. Bioprosthetic aortic valve with normal hemodynamics.   8. Mild aortic valve regurgitation.   9. Left ventricular cavity size is moderately dilated.  10. Severe apical wall hypokinesis.  11. There is global hypokinesis of the left ventricle with minor regional variations.    TTE 7/27/2023  1. Left ventricular systolic function is severely decreased  with a 15-20% estimated ejection fraction.  2. Moderately enlarged right ventricle.  3. There is severely reduced right ventricular systolic function.  4. The left atrium is moderately dilated.  5. The right atrium is moderately dilated.  6. The mitral valve is moderately thickened.  7. Moderate mitral valve regurgitation.  8. Mild to moderate tricuspid regurgitation visualized.  9. Moderately elevated right ventricular systolic pressure.  10. Mild aortic valve regurgitation.  11. Left ventricular cavity size is moderately dilated.  12. The inferior vena cava appears moderately dilated.  13. Normally functioning bioprosthetic aortic valve with a mild perivalvular leak.  14. There is global hypokinesis of the left ventricle with minor regional variations.    TTE 6/15/2023  1. Left ventricular systolic function is severely decreased with a 20-25% estimated ejection fraction.  2. There is an elevated left ventricular end diastolic pressure.  3. There is severe eccentric left ventricular hypertrophy.  4. There is reduced right ventricular systolic function.  5. The left atrium is severely dilated.  6. There is a moderate pleural effusion.  7. Aortic valve appears abnormal.  8. Severe aortic valve stenosis.  9. There is severe aortic valve cusp calcification.  10. Mild aortic valve regurgitation.  11. Left ventricular cavity size is severely dilated.  12. There is global hypokinesis of the left ventricle with minor regional variations.    TTE 3/24/23  1. Left ventricular systolic function is moderately decreased with a 30-35% estimated ejection fraction.  2. Spectral Doppler shows a pseudonormal pattern of left ventricular diastolic filling.  3. There is moderately reduced right ventricular systolic function.  4. The left atrium is moderately dilated.  5. Moderate mitral valve regurgitation.  6. RVSP within normal limits.  7. Aortic valve appears abnormal.  8. Severe aortic valve stenosis.  9. The inferior vena cava  appears moderately dilated.  10. Severe inferior wall hypokinesis.  11. Low output severe aortic stenosis.  12. Since the study of 11/21, there has been worsening of the degree of aortic stenosis and deterioration in leeft ventricular function.    TTE 11/11/2021   1. The left ventricular systolic function is mildly to moderately decreased with a 40% estimated ejection fraction.   2. Spectral Doppler shows a pseudonormal pattern of left ventricular diastolic filling.   3. There is moderate asymmetric left ventricular hypertrophy.   4. Moderate aortic valve stenosis.   5. Ascending aorta measures 4.2 cm.   6. Systolic anterior motion of the mitral valve is present.   7. Peak aortic valve gradient 36 mmHg with a mean gradient of 20 mmHg.   8. Severe apical and inferoapical hypokinesis.   9. No outflow obstruction.  10. There are multiple wall motion abnormalities.    TTE 9/3/2020   1. The left ventricular systolic function is mildly decreased with a 45-50% estimated ejection fraction.   2. Spectral Doppler shows an impaired relaxation pattern of left ventricular diastolic filling.   3. Mild aortic valve stenosis.   4. There is mild to moderate aortic valve regurgitation.   5. Mildly dilated aortic root and ascending aorta.   6. Severe apical wall hypokinesis.   7. Peak aortic valve gradient is 28 mmHg with a mean gradient of 14 mmHg.   8. There are multiple wall motion abnormalities.    TTE 5/9/2019   1. The left ventricular systolic function is low normal with a 50-55% estimated ejection fraction.   2. Spectral Doppler shows an impaired relaxation pattern of left ventricular diastolic filling.   3. Mild aortic valve stenosis.   4. There is mild aortic valve regurgitation.   5. Anterior wall hypokinesis.   6. Mild to moderate left ventricular hypertrophy.   7. No change from the record of 10/26/17.   8. There is moderate dilatation of the ascending aorta.    Cath:  TAVR 6/5/2023  Aortic Valve replacement with  successful implantation of Evolut FX 34mm valve     L & R HC 4/21/2023  1. Angiographically normal left main Proximal to mid LAD 30 to 40% stenosis prior to the stent in mid LAD without significant in-stent restenosis. Mild nono Luminal irregularities in the rest of the LAD  Luminal irregularities in left circumflex  Luminal irregularities in large dominant right coronary artery  Right dominant coronary system  Normal cardiac output  Elevated filling pressure.    Stress Test:  No results found for this or any previous visit from the past 1095 days.    Cardiac Imaging:  No results found for this or any previous visit from the past 1095 days.    I have personally reviewed most recent PCP, cardiology, vascular, studies and/or documentation.      Assessment/Plan   Systolic HFrEF, most recent EF 20%. He appears euvolemic on exam today. His weigh has been stable since discharge from hospital. He reports monitoring his fluid and sodium intake at home. He is currently on furosemide 20mg BID, metoprolol, lisinopril, and farxiga daily. Referral to advanced heart failure specialist and electrophysiology for possible AICD placement.     HTN, stable, /72 today, however he has had reading in the 90s also. Will decrease his lisinopril down to 5 mg daily.     HLD, 5/6/24 LDL 60, HDL 56.9, trig 67, he is on rosuvastatin 5mg daily. Goal LDL <70.     Paroxsymal a-fib/flutter, noted on EKG on presentation to the ED. Patient is rate controlled on beta-block, [id number] score of at least 4. He was started on low dose Eliquis 2.5mg BID considering his age and renal function. Today he appears to be NSR on exam heart rate is regular.     Type II MI, elevated troponin in the setting of renal insufficiency and heart failure.  Note prior 4/2023 cardiac catheterization with minimal coronary disease. No complaints of chest pain today.     CAD, s/p PCI with x1 KAROL to LAD (9/16/2015). Mild nonobstructive, 30 to 40% stenosis prior to the  stent in mid LAD without significant in-stent restenosis noted on L&R HC (4/21/23). Patient denies any complaints of chest pain whatsoever at today. Continue high intensity statin and aspirin.     AV stenosis, s/p TAVR with a Evolut FX 34mm valve (6/5/23). Most recent echo showing normally functioning bioprosthetic aortic valve with a mild perivalvular leak (11/13/24).    Acending aortic aneurysm, most recent echo showing 4.33 cm (11/13/24). Continue to monitor with annual echo.     CKD 3b, baseline Cr 1.4-1.7. Patient would benefit from nephrology referral. Repeat BMP ordered.     Follow up with Dr. Cardozo 3-4 months.     Dorothy Goodwin, DAMIAN-CNP

## 2025-01-10 LAB
ATRIAL RATE: 267 BPM
Q ONSET: 212 MS
QRS COUNT: 13 BEATS
QRS DURATION: 122 MS
QT INTERVAL: 396 MS
QTC CALCULATION(BAZETT): 460 MS
QTC FREDERICIA: 437 MS
R AXIS: -50 DEGREES
T AXIS: 88 DEGREES
T OFFSET: 410 MS
VENTRICULAR RATE: 81 BPM

## 2025-01-10 RX ORDER — LISINOPRIL 5 MG/1
5 TABLET ORAL DAILY
Qty: 90 TABLET | Refills: 3 | Status: SHIPPED | OUTPATIENT
Start: 2025-01-10 | End: 2026-01-10

## 2025-01-13 ENCOUNTER — LAB (OUTPATIENT)
Dept: LAB | Facility: LAB | Age: OVER 89
End: 2025-01-13
Payer: MEDICARE

## 2025-01-13 ENCOUNTER — APPOINTMENT (OUTPATIENT)
Dept: PRIMARY CARE | Facility: CLINIC | Age: OVER 89
End: 2025-01-13
Payer: MEDICARE

## 2025-01-13 VITALS
BODY MASS INDEX: 21.05 KG/M2 | OXYGEN SATURATION: 97 % | SYSTOLIC BLOOD PRESSURE: 110 MMHG | DIASTOLIC BLOOD PRESSURE: 68 MMHG | TEMPERATURE: 97.5 F | HEART RATE: 68 BPM | WEIGHT: 118.8 LBS | HEIGHT: 63 IN

## 2025-01-13 DIAGNOSIS — N18.31 STAGE 3A CHRONIC KIDNEY DISEASE (MULTI): ICD-10-CM

## 2025-01-13 DIAGNOSIS — C61 CARCINOMA OF PROSTATE (MULTI): ICD-10-CM

## 2025-01-13 DIAGNOSIS — I12.0 BENIGN HYPERTENSIVE KIDNEY DISEASE WITH CHRONIC KIDNEY DISEASE STAGE V OR END STAGE RENAL DISEASE (MULTI): ICD-10-CM

## 2025-01-13 DIAGNOSIS — Z95.2 S/P TAVR (TRANSCATHETER AORTIC VALVE REPLACEMENT): ICD-10-CM

## 2025-01-13 DIAGNOSIS — I50.42 CHRONIC COMBINED SYSTOLIC AND DIASTOLIC CONGESTIVE HEART FAILURE: Primary | ICD-10-CM

## 2025-01-13 PROCEDURE — 1157F ADVNC CARE PLAN IN RCRD: CPT | Performed by: FAMILY MEDICINE

## 2025-01-13 PROCEDURE — 3074F SYST BP LT 130 MM HG: CPT | Performed by: FAMILY MEDICINE

## 2025-01-13 PROCEDURE — 1036F TOBACCO NON-USER: CPT | Performed by: FAMILY MEDICINE

## 2025-01-13 PROCEDURE — 1160F RVW MEDS BY RX/DR IN RCRD: CPT | Performed by: FAMILY MEDICINE

## 2025-01-13 PROCEDURE — 99496 TRANSJ CARE MGMT HIGH F2F 7D: CPT | Performed by: FAMILY MEDICINE

## 2025-01-13 PROCEDURE — 1126F AMNT PAIN NOTED NONE PRSNT: CPT | Performed by: FAMILY MEDICINE

## 2025-01-13 PROCEDURE — 1159F MED LIST DOCD IN RCRD: CPT | Performed by: FAMILY MEDICINE

## 2025-01-13 PROCEDURE — 3078F DIAST BP <80 MM HG: CPT | Performed by: FAMILY MEDICINE

## 2025-01-13 PROCEDURE — 80048 BASIC METABOLIC PNL TOTAL CA: CPT

## 2025-01-13 PROCEDURE — 1111F DSCHRG MED/CURRENT MED MERGE: CPT | Performed by: FAMILY MEDICINE

## 2025-01-13 ASSESSMENT — ENCOUNTER SYMPTOMS
APNEA: 0
FATIGUE: 1
SHORTNESS OF BREATH: 1
LOSS OF SENSATION IN FEET: 0
OCCASIONAL FEELINGS OF UNSTEADINESS: 0
PSYCHIATRIC NEGATIVE: 1
GASTROINTESTINAL NEGATIVE: 1
DEPRESSION: 0
CARDIOVASCULAR NEGATIVE: 1

## 2025-01-13 ASSESSMENT — PAIN SCALES - GENERAL: PAINLEVEL_OUTOF10: 0-NO PAIN

## 2025-01-13 NOTE — PROGRESS NOTES
"Subjective   Patient ID: Sravan Christiansen is a 89 y.o. male who presents for Hospital Follow-up (formerly [facility]-1/1/2025-sob).    HPI     Review of Systems   Constitutional:  Positive for fatigue.   Respiratory:  Positive for shortness of breath. Negative for apnea.    Cardiovascular: Negative.         Patient was admitted at [facility] from January 1 to January 3 secondary to acute congestive heart failure and new onset of atrial fibrillation.   Gastrointestinal: Negative.    Psychiatric/Behavioral: Negative.         Objective   /68 (BP Location: Left arm)   Pulse 68   Temp 36.4 °C (97.5 °F) (Temporal)   Ht 1.6 m (5' 3\")   Wt 53.9 kg (118 lb 12.8 oz)   SpO2 97%   BMI 21.04 kg/m²     Physical Exam  Vitals and nursing note reviewed.   Constitutional:       Appearance: Normal appearance.   Cardiovascular:      Rate and Rhythm: Normal rate and regular rhythm.      Heart sounds: Normal heart sounds.   Pulmonary:      Breath sounds: Normal breath sounds.   Abdominal:      Palpations: Abdomen is soft.   Neurological:      General: No focal deficit present.      Mental Status: He is alert and oriented to person, place, and time.   Psychiatric:         Mood and Affect: Mood normal.         Behavior: Behavior normal.         Assessment/Plan patient seen here for a follow-up after having been admitted at [facility] from January 1 January 3 with new onset atrial fibs and congestive heart failure reviewed his medications pre and post admission he is feeling better he has seen cardiology since his discharge she has a appointment with Dr. Ramicone and the heart failure specialist's.  I will see him back in 1 month I will monitor his kidney function at the present time.  Problem List Items Addressed This Visit             ICD-10-CM    Chronic combined systolic and diastolic congestive heart failure - Primary I50.42    Carcinoma of prostate (Multi) C61    S/P TAVR (transcatheter aortic valve replacement) Z95.2    Stage " 3a chronic kidney disease (Multi) N18.31

## 2025-01-14 LAB
ANION GAP SERPL CALC-SCNC: 17 MMOL/L (ref 10–20)
BUN SERPL-MCNC: 45 MG/DL (ref 6–23)
CALCIUM SERPL-MCNC: 9.4 MG/DL (ref 8.6–10.6)
CHLORIDE SERPL-SCNC: 101 MMOL/L (ref 98–107)
CO2 SERPL-SCNC: 28 MMOL/L (ref 21–32)
CREAT SERPL-MCNC: 1.85 MG/DL (ref 0.5–1.3)
EGFRCR SERPLBLD CKD-EPI 2021: 33 ML/MIN/1.73M*2
GLUCOSE SERPL-MCNC: 86 MG/DL (ref 74–99)
POTASSIUM SERPL-SCNC: 4.5 MMOL/L (ref 3.5–5.3)
SODIUM SERPL-SCNC: 141 MMOL/L (ref 136–145)

## 2025-01-20 ENCOUNTER — OFFICE VISIT (OUTPATIENT)
Dept: URGENT CARE | Age: OVER 89
End: 2025-01-20
Payer: MEDICARE

## 2025-01-20 VITALS
WEIGHT: 115 LBS | RESPIRATION RATE: 14 BRPM | SYSTOLIC BLOOD PRESSURE: 105 MMHG | DIASTOLIC BLOOD PRESSURE: 67 MMHG | OXYGEN SATURATION: 96 % | BODY MASS INDEX: 20.37 KG/M2 | TEMPERATURE: 97.8 F | HEART RATE: 69 BPM

## 2025-01-20 DIAGNOSIS — S30.1XXA CONTUSION OF ABDOMINAL WALL, INITIAL ENCOUNTER: Primary | ICD-10-CM

## 2025-01-20 ASSESSMENT — ENCOUNTER SYMPTOMS
VOMITING: 0
SHORTNESS OF BREATH: 0
FEVER: 0
NAUSEA: 0
BLOOD IN STOOL: 0
HEMATURIA: 0
CONSTIPATION: 0
CHILLS: 0
ABDOMINAL PAIN: 0

## 2025-01-20 ASSESSMENT — PAIN SCALES - GENERAL: PAINLEVEL_OUTOF10: 0-NO PAIN

## 2025-01-20 NOTE — PROGRESS NOTES
Subjective   Patient ID: Sravan Christiansen is a 89 y.o. male. They present today with a chief complaint of Bleeding/Bruising (Large bruise on abdomen, pt states he noticed today. Pt denies any recent injury, and was recently put on Eliquis by Cardiologist. ).    History of Present Illness  HPI    Patient presents to urgent care with son for complaints of a bruise his right lower abdomen.  Patient states that he noticed it this morning.  He denies any falls or injury to his abdomen.  He states that he was recently put on Eliquis by his cardiologist and also 81 mg of aspirin.  Patient states that he was washing dishes and does lean up against the counter.  He denies any chest pain, shortness of breath, blood in his urine, or any bruises anywhere else.    Past Medical History  Allergies as of 01/20/2025 - Reviewed 01/20/2025   Allergen Reaction Noted    Penicillins Rash 10/20/2014       (Not in a hospital admission)       Past Medical History:   Diagnosis Date    Abnormal result of other cardiovascular function study     Abnormal nuclear stress test    Bicipital tendinitis, left shoulder 07/07/2021    Biceps tendinitis, left    Bicipital tendinitis, right shoulder 03/04/2021    Biceps tendinitis, right    Pain in left hip 10/17/2019    Acute pain of left hip    Personal history of malignant neoplasm of prostate     History of malignant neoplasm of prostate    Personal history of other benign neoplasm 06/13/2018    History of other benign neoplasm    Personal history of other diseases of the circulatory system     History of cardiac disorder    Personal history of other diseases of the circulatory system     History of essential hypertension    Personal history of other diseases of the circulatory system     History of aortic valve stenosis    Personal history of other diseases of the circulatory system     History of angina pectoris    Personal history of other diseases of the respiratory system 12/31/2018    History  of acute bacterial sinusitis       Past Surgical History:   Procedure Laterality Date    ANKLE ARTHROSCOPY W/ OPEN REPAIR  02/18/2016    Ankle Repair    CATARACT EXTRACTION  06/07/2018    Cataract Surgery    COLONOSCOPY  04/13/2018    Colonoscopy    CORONARY ANGIOPLASTY WITH STENT PLACEMENT  02/18/2016    Cath Stent Placement    HERNIA REPAIR  02/18/2016    Hernia Repair    PROSTATE SURGERY  04/13/2018    Prostate Surgery        reports that he has never smoked. He has never used smokeless tobacco. He reports current alcohol use. He reports that he does not use drugs.    Review of Systems  Review of Systems   Constitutional:  Negative for chills and fever.   Respiratory:  Negative for shortness of breath.    Cardiovascular:  Negative for chest pain.   Gastrointestinal:  Negative for abdominal pain, blood in stool, constipation, nausea and vomiting.   Genitourinary:  Negative for hematuria.                                  Objective    Vitals:    01/20/25 1042   BP: 105/67   Pulse: 69   Resp: 14   Temp: 36.6 °C (97.8 °F)   SpO2: 96%   Weight: 52.2 kg (115 lb)     No LMP for male patient.    Physical Exam  Vitals reviewed.   Constitutional:       Appearance: Normal appearance.   HENT:      Head: Normocephalic.   Cardiovascular:      Rate and Rhythm: Normal rate and regular rhythm.   Pulmonary:      Effort: Pulmonary effort is normal.      Breath sounds: Normal breath sounds.   Abdominal:      General: Abdomen is flat. Bowel sounds are normal.      Palpations: Abdomen is soft.          Comments: Bruising noted to right lower abdomen.   Skin:     General: Skin is warm and dry.      Findings: Bruising (right lower abdomen) present.   Neurological:      Mental Status: He is alert.         Procedures    Point of Care Test & Imaging Results from this visit  No results found for this visit on 01/20/25.   No results found.    Diagnostic study results (if any) were reviewed by DAMIAN Gomez-CNP.    Assessment/Plan    Allergies, medications, history, and pertinent labs/EKGs/Imaging reviewed by JAMESON Gomez.     Medical Decision Making  Bruising located on right lower abdomen.  Physical assessment otherwise unremarkable.  Patient had no other symptoms or no trauma to abdomen.  Patient was told to call his cardiologist regarding his bruising.    At time of discharge patient was clinically well-appearing and HDS for outpatient management. The patient and/or family was educated regarding diagnosis, supportive care, OTC and Rx medications. The patient and/or family was given the opportunity to ask questions prior to discharge.  They verbalized understanding of my discussion of the plans for treatment, expected course, indications to return to  or seek further evaluation in ED, and the need for timely follow up as directed.   They were provided with a work/school excuse if requested.    Orders and Diagnoses  Diagnoses and all orders for this visit:  Contusion of abdominal wall, initial encounter      Medical Admin Record      Patient disposition: Home    Electronically signed by JAMESON Gomez  11:01 AM

## 2025-01-21 ENCOUNTER — PATIENT OUTREACH (OUTPATIENT)
Dept: PRIMARY CARE | Facility: CLINIC | Age: OVER 89
End: 2025-01-21
Payer: MEDICARE

## 2025-01-21 ENCOUNTER — TELEPHONE (OUTPATIENT)
Dept: PRIMARY CARE | Facility: CLINIC | Age: OVER 89
End: 2025-01-21
Payer: MEDICARE

## 2025-01-21 NOTE — TELEPHONE ENCOUNTER
Pt has huge black & blue on his belly-went to urgent care.  Just a bruise.  The Cardiologist put him on eliquis.  Np at cardiology thought he should he should be on 81 aspirin   Please advise  Would like your advise  Ty

## 2025-01-21 NOTE — PROGRESS NOTES
Call regarding appt. with PCP on 1/13/2025 after hospitalization.  At time of outreach call the patient feels as if their condition has improved since last visit.  Reviewed the PCP appointment with the pt and addressed any questions or concerns.  Sravan has followed up with PC and Cardio, doing pretty well.

## 2025-02-05 DIAGNOSIS — E78.2 HYPERLIPIDEMIA, MIXED: ICD-10-CM

## 2025-02-05 RX ORDER — ROSUVASTATIN CALCIUM 5 MG/1
5 TABLET, COATED ORAL DAILY
Qty: 90 TABLET | Refills: 3 | Status: SHIPPED | OUTPATIENT
Start: 2025-02-05

## 2025-02-09 PROBLEM — I50.22 CHRONIC SYSTOLIC (CONGESTIVE) HEART FAILURE: Status: ACTIVE | Noted: 2023-04-19

## 2025-02-10 ENCOUNTER — OFFICE VISIT (OUTPATIENT)
Dept: CARDIOLOGY | Facility: CLINIC | Age: OVER 89
End: 2025-02-10
Payer: MEDICARE

## 2025-02-10 VITALS
BODY MASS INDEX: 21.44 KG/M2 | HEIGHT: 63 IN | SYSTOLIC BLOOD PRESSURE: 108 MMHG | WEIGHT: 121 LBS | HEART RATE: 70 BPM | DIASTOLIC BLOOD PRESSURE: 60 MMHG | OXYGEN SATURATION: 98 %

## 2025-02-10 DIAGNOSIS — I50.43 ACUTE ON CHRONIC COMBINED SYSTOLIC (CONGESTIVE) AND DIASTOLIC (CONGESTIVE) HEART FAILURE: ICD-10-CM

## 2025-02-10 DIAGNOSIS — I48.3 TYPICAL ATRIAL FLUTTER (MULTI): ICD-10-CM

## 2025-02-10 DIAGNOSIS — I48.91 NEW ONSET ATRIAL FIBRILLATION (MULTI): ICD-10-CM

## 2025-02-10 DIAGNOSIS — I50.22 CHRONIC SYSTOLIC (CONGESTIVE) HEART FAILURE: Primary | ICD-10-CM

## 2025-02-10 PROCEDURE — 99214 OFFICE O/P EST MOD 30 MIN: CPT | Performed by: INTERNAL MEDICINE

## 2025-02-10 PROCEDURE — 3078F DIAST BP <80 MM HG: CPT | Performed by: INTERNAL MEDICINE

## 2025-02-10 PROCEDURE — 1157F ADVNC CARE PLAN IN RCRD: CPT | Performed by: INTERNAL MEDICINE

## 2025-02-10 PROCEDURE — 3074F SYST BP LT 130 MM HG: CPT | Performed by: INTERNAL MEDICINE

## 2025-02-10 PROCEDURE — 1036F TOBACCO NON-USER: CPT | Performed by: INTERNAL MEDICINE

## 2025-02-10 PROCEDURE — 1159F MED LIST DOCD IN RCRD: CPT | Performed by: INTERNAL MEDICINE

## 2025-02-10 NOTE — ASSESSMENT & PLAN NOTE
The patient was found to have atrial flutter in January 2025.  He is asymptomatic and the rate is controlled.  Continue with Eliquis for anticoagulation.

## 2025-02-10 NOTE — ASSESSMENT & PLAN NOTE
This patient has a history of congestive heart failure and has severe left ventricular dysfunction.  His ejection fraction has been in the range of 20 to 30%.  Given his advanced age and frail condition, I do not feel that he is an appropriate candidate for a primary prevention ICD insertion.  Continue the current heart failure medication regimen.

## 2025-02-14 ENCOUNTER — APPOINTMENT (OUTPATIENT)
Dept: PRIMARY CARE | Facility: CLINIC | Age: OVER 89
End: 2025-02-14
Payer: MEDICARE

## 2025-02-14 VITALS
SYSTOLIC BLOOD PRESSURE: 110 MMHG | DIASTOLIC BLOOD PRESSURE: 72 MMHG | HEIGHT: 63 IN | BODY MASS INDEX: 20.94 KG/M2 | WEIGHT: 118.2 LBS

## 2025-02-14 DIAGNOSIS — I50.22 CHRONIC SYSTOLIC (CONGESTIVE) HEART FAILURE: ICD-10-CM

## 2025-02-14 DIAGNOSIS — Z95.2 S/P TAVR (TRANSCATHETER AORTIC VALVE REPLACEMENT): ICD-10-CM

## 2025-02-14 DIAGNOSIS — I10 ESSENTIAL HYPERTENSION: ICD-10-CM

## 2025-02-14 DIAGNOSIS — E78.2 HYPERLIPIDEMIA, MIXED: ICD-10-CM

## 2025-02-14 DIAGNOSIS — I48.91 ATRIAL FIBRILLATION, TRANSIENT (MULTI): Primary | ICD-10-CM

## 2025-02-14 DIAGNOSIS — C61 CARCINOMA OF PROSTATE (MULTI): ICD-10-CM

## 2025-02-14 PROCEDURE — 1160F RVW MEDS BY RX/DR IN RCRD: CPT | Performed by: FAMILY MEDICINE

## 2025-02-14 PROCEDURE — 1126F AMNT PAIN NOTED NONE PRSNT: CPT | Performed by: FAMILY MEDICINE

## 2025-02-14 PROCEDURE — 3078F DIAST BP <80 MM HG: CPT | Performed by: FAMILY MEDICINE

## 2025-02-14 PROCEDURE — 3074F SYST BP LT 130 MM HG: CPT | Performed by: FAMILY MEDICINE

## 2025-02-14 PROCEDURE — 1157F ADVNC CARE PLAN IN RCRD: CPT | Performed by: FAMILY MEDICINE

## 2025-02-14 PROCEDURE — 1036F TOBACCO NON-USER: CPT | Performed by: FAMILY MEDICINE

## 2025-02-14 PROCEDURE — 1159F MED LIST DOCD IN RCRD: CPT | Performed by: FAMILY MEDICINE

## 2025-02-14 PROCEDURE — G2211 COMPLEX E/M VISIT ADD ON: HCPCS | Performed by: FAMILY MEDICINE

## 2025-02-14 PROCEDURE — 99214 OFFICE O/P EST MOD 30 MIN: CPT | Performed by: FAMILY MEDICINE

## 2025-02-14 ASSESSMENT — PAIN SCALES - GENERAL: PAINLEVEL_OUTOF10: 0-NO PAIN

## 2025-02-14 ASSESSMENT — ENCOUNTER SYMPTOMS
CARDIOVASCULAR NEGATIVE: 1
ARTHRALGIAS: 1
LOSS OF SENSATION IN FEET: 0
CONSTITUTIONAL NEGATIVE: 1
PSYCHIATRIC NEGATIVE: 1
RESPIRATORY NEGATIVE: 1
OCCASIONAL FEELINGS OF UNSTEADINESS: 0
DEPRESSION: 0
ENDOCRINE NEGATIVE: 1
NEUROLOGICAL NEGATIVE: 1
GASTROINTESTINAL NEGATIVE: 1

## 2025-02-14 ASSESSMENT — PATIENT HEALTH QUESTIONNAIRE - PHQ9
SUM OF ALL RESPONSES TO PHQ9 QUESTIONS 1 AND 2: 0
2. FEELING DOWN, DEPRESSED OR HOPELESS: NOT AT ALL
1. LITTLE INTEREST OR PLEASURE IN DOING THINGS: NOT AT ALL

## 2025-02-14 NOTE — PROGRESS NOTES
"Subjective   Patient ID: Sravan Christiansen is a 89 y.o. male who presents for Follow-up (1 month hosp ).    HPI     Review of Systems   Constitutional: Negative.    HENT: Negative.     Respiratory: Negative.     Cardiovascular: Negative.         Dr Ramicone   Gastrointestinal: Negative.    Endocrine: Negative.    Musculoskeletal:  Positive for arthralgias.   Neurological: Negative.    Psychiatric/Behavioral: Negative.         Objective   /72 (BP Location: Left arm)   Ht 1.6 m (5' 3\")   Wt 53.6 kg (118 lb 3.2 oz)   BMI 20.94 kg/m²     Physical Exam  Vitals and nursing note reviewed.   Constitutional:       Appearance: Normal appearance.   Cardiovascular:      Rate and Rhythm: Normal rate and regular rhythm.      Pulses: Normal pulses.      Heart sounds: Normal heart sounds.   Pulmonary:      Breath sounds: Normal breath sounds.   Abdominal:      Palpations: Abdomen is soft.   Musculoskeletal:         General: Normal range of motion.   Neurological:      General: No focal deficit present.      Mental Status: He is alert and oriented to person, place, and time.   Psychiatric:         Mood and Affect: Mood normal.         Behavior: Behavior normal.         Assessment/Plan patient seen here to follow-up after having been in the ER with new onset atrial fibrillation he has seen cardiology since we reviewed his medications pre and post visit.  Will see him back as needed  Problem List Items Addressed This Visit             ICD-10-CM    Essential hypertension I10    Hyperlipidemia, mixed E78.2    Chronic systolic (congestive) heart failure I50.22    Carcinoma of prostate (Multi) C61    S/P TAVR (transcatheter aortic valve replacement) Z95.2     Other Visit Diagnoses         Codes    Atrial fibrillation, transient (Multi)    -  Primary I48.91               "

## 2025-02-20 ENCOUNTER — PATIENT OUTREACH (OUTPATIENT)
Dept: PRIMARY CARE | Facility: CLINIC | Age: OVER 89
End: 2025-02-20
Payer: MEDICARE

## 2025-02-27 PROBLEM — J06.9 URI, ACUTE: Status: RESOLVED | Noted: 2023-03-24 | Resolved: 2025-02-27

## 2025-03-07 ENCOUNTER — TELEPHONE (OUTPATIENT)
Dept: PRIMARY CARE | Facility: CLINIC | Age: OVER 89
End: 2025-03-07
Payer: MEDICARE

## 2025-03-07 NOTE — TELEPHONE ENCOUNTER
Dentist office needs form filled out-I will put on  Your desk to be faxed back Monday by 9:30-10.  Ty

## 2025-03-07 NOTE — TELEPHONE ENCOUNTER
Pt is dr. DELISA pena & was here 2/14 & is going to have tooth extracted Monday.  Can he authorization for this procedure?  He was there yesterday  Please advise  Ty  Phone #[phone]-Jessica/Cruz
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
## 2025-03-20 ENCOUNTER — OFFICE VISIT (OUTPATIENT)
Dept: CARDIOLOGY | Facility: CLINIC | Age: OVER 89
End: 2025-03-20
Payer: MEDICARE

## 2025-03-20 VITALS
HEART RATE: 68 BPM | OXYGEN SATURATION: 99 % | WEIGHT: 119 LBS | BODY MASS INDEX: 21.08 KG/M2 | DIASTOLIC BLOOD PRESSURE: 68 MMHG | SYSTOLIC BLOOD PRESSURE: 116 MMHG

## 2025-03-20 DIAGNOSIS — I48.91 NEW ONSET ATRIAL FIBRILLATION (MULTI): ICD-10-CM

## 2025-03-20 DIAGNOSIS — I48.92 ATRIAL FLUTTER, UNSPECIFIED TYPE (MULTI): ICD-10-CM

## 2025-03-20 DIAGNOSIS — I25.118 CORONARY ARTERY DISEASE OF NATIVE ARTERY OF NATIVE HEART WITH STABLE ANGINA PECTORIS: ICD-10-CM

## 2025-03-20 DIAGNOSIS — I50.43 ACUTE ON CHRONIC COMBINED SYSTOLIC (CONGESTIVE) AND DIASTOLIC (CONGESTIVE) HEART FAILURE: ICD-10-CM

## 2025-03-20 DIAGNOSIS — I50.42 CHRONIC COMBINED SYSTOLIC AND DIASTOLIC HF (HEART FAILURE), NYHA CLASS 1: ICD-10-CM

## 2025-03-20 PROCEDURE — 99205 OFFICE O/P NEW HI 60 MIN: CPT | Performed by: SPECIALIST

## 2025-03-20 PROCEDURE — G2211 COMPLEX E/M VISIT ADD ON: HCPCS | Performed by: SPECIALIST

## 2025-03-20 PROCEDURE — 1159F MED LIST DOCD IN RCRD: CPT | Performed by: SPECIALIST

## 2025-03-20 PROCEDURE — 1157F ADVNC CARE PLAN IN RCRD: CPT | Performed by: SPECIALIST

## 2025-03-20 PROCEDURE — 3074F SYST BP LT 130 MM HG: CPT | Performed by: SPECIALIST

## 2025-03-20 PROCEDURE — 99215 OFFICE O/P EST HI 40 MIN: CPT | Performed by: SPECIALIST

## 2025-03-20 PROCEDURE — 3078F DIAST BP <80 MM HG: CPT | Performed by: SPECIALIST

## 2025-03-20 NOTE — PATIENT INSTRUCTIONS
It was very nice to meet you today.  We did not make any changes in your medications just yet.  Please get blood work to make sure things are okay after your recent hospitalization.  To try and get you to feel better, let us try to get cardiac rehabilitation.  I would like to try this for a period of about 3 months.  As discussed, I would like to try and avoid invasive procedures to the extent possible.  If things hold the ground, or get better, we will continue with medications alone.  However, if you continue to not do well, we will certainly revisit this issue.  Please stay in touch with us over the next several weeks.  You can call us at [phone], or message us on [employer] with any questions or concerns regarding your heart.  Keep your appointment with Dr. Cardozo as previously scheduled.  Please have her come back and see us in about 3 to 4 months time.  Hopefully this will be after we have had a chance to assess how you responded to the cardiac rehabilitation.

## 2025-03-21 DIAGNOSIS — I50.22 CHRONIC SYSTOLIC HEART FAILURE: Primary | ICD-10-CM

## 2025-03-21 LAB
ALBUMIN SERPL-MCNC: 4.6 G/DL (ref 3.6–5.1)
ALP SERPL-CCNC: 69 U/L (ref 35–144)
ALT SERPL-CCNC: 20 U/L (ref 9–46)
ANION GAP SERPL CALCULATED.4IONS-SCNC: 10 MMOL/L (CALC) (ref 7–17)
AST SERPL-CCNC: 22 U/L (ref 10–35)
BILIRUB SERPL-MCNC: 1.3 MG/DL (ref 0.2–1.2)
BNP SERPL-MCNC: 2247 PG/ML
BUN SERPL-MCNC: 32 MG/DL (ref 7–25)
CALCIUM SERPL-MCNC: 9.1 MG/DL (ref 8.6–10.3)
CHLORIDE SERPL-SCNC: 103 MMOL/L (ref 98–110)
CO2 SERPL-SCNC: 28 MMOL/L (ref 20–32)
CREAT SERPL-MCNC: 1.71 MG/DL (ref 0.7–1.22)
EGFRCR SERPLBLD CKD-EPI 2021: 37 ML/MIN/1.73M2
ERYTHROCYTE [DISTWIDTH] IN BLOOD BY AUTOMATED COUNT: 13.1 % (ref 11–15)
GLUCOSE SERPL-MCNC: 82 MG/DL (ref 65–99)
HCT VFR BLD AUTO: 43.1 % (ref 38.5–50)
HGB BLD-MCNC: 13.9 G/DL (ref 13.2–17.1)
MCH RBC QN AUTO: 30.9 PG (ref 27–33)
MCHC RBC AUTO-ENTMCNC: 32.3 G/DL (ref 32–36)
MCV RBC AUTO: 95.8 FL (ref 80–100)
PLATELET # BLD AUTO: 122 THOUSAND/UL (ref 140–400)
PMV BLD REES-ECKER: 12.2 FL (ref 7.5–12.5)
POTASSIUM SERPL-SCNC: 4.9 MMOL/L (ref 3.5–5.3)
PROT SERPL-MCNC: 6.9 G/DL (ref 6.1–8.1)
RBC # BLD AUTO: 4.5 MILLION/UL (ref 4.2–5.8)
SODIUM SERPL-SCNC: 141 MMOL/L (ref 135–146)
WBC # BLD AUTO: 6 THOUSAND/UL (ref 3.8–10.8)

## 2025-03-25 DIAGNOSIS — F41.8 SITUATIONAL ANXIETY: ICD-10-CM

## 2025-03-25 RX ORDER — LORAZEPAM 0.5 MG/1
0.5 TABLET ORAL DAILY PRN
Qty: 90 TABLET | Refills: 0 | Status: SHIPPED | OUTPATIENT
Start: 2025-03-25

## 2025-04-03 ENCOUNTER — CLINICAL SUPPORT (OUTPATIENT)
Dept: CARDIAC REHAB | Facility: HOSPITAL | Age: OVER 89
End: 2025-04-03
Payer: MEDICARE

## 2025-04-03 VITALS — WEIGHT: 115 LBS | HEIGHT: 63 IN | BODY MASS INDEX: 20.38 KG/M2

## 2025-04-03 DIAGNOSIS — I25.118 CORONARY ARTERY DISEASE OF NATIVE ARTERY OF NATIVE HEART WITH STABLE ANGINA PECTORIS: ICD-10-CM

## 2025-04-03 DIAGNOSIS — I50.43 ACUTE ON CHRONIC COMBINED SYSTOLIC (CONGESTIVE) AND DIASTOLIC (CONGESTIVE) HEART FAILURE: ICD-10-CM

## 2025-04-03 DIAGNOSIS — I50.9 HEART FAILURE, UNSPECIFIED HF CHRONICITY, UNSPECIFIED HEART FAILURE TYPE: ICD-10-CM

## 2025-04-03 DIAGNOSIS — I48.91 NEW ONSET ATRIAL FIBRILLATION (MULTI): ICD-10-CM

## 2025-04-03 DIAGNOSIS — I48.92 ATRIAL FLUTTER, UNSPECIFIED TYPE (MULTI): ICD-10-CM

## 2025-04-03 DIAGNOSIS — I50.42 CHRONIC COMBINED SYSTOLIC AND DIASTOLIC HF (HEART FAILURE), NYHA CLASS 1: ICD-10-CM

## 2025-04-03 ASSESSMENT — DUKE ACTIVITY SCORE INDEX (DASI)
CAN YOU WALK INDOORS, SUCH AS AROUND YOUR HOUSE: YES
CAN YOU PARTICIPATE IN STRENOUS SPORTS LIKE SWIMMING, SINGLES TENNIS, FOOTBALL, BASKETBALL, OR SKIING: NO
DASI METS SCORE: 5.1
CAN YOU DO LIGHT WORK AROUND THE HOUSE LIKE DUSTING OR WASHING DISHES: YES
CAN YOU PARTICIPATE IN MODERATE RECREATIONAL ACTIVITIES LIKE GOLF, BOWLING, DANCING, DOUBLES TENNIS OR THROWING A BASEBALL OR FOOTBALL: NO
CAN YOU DO HEAVY WORK AROUND THE HOUSE LIKE SCRUBBING FLOORS OR LIFTING AND MOVING HEAVY FURNITURE: NO
TOTAL_SCORE: 18.95
CAN YOU HAVE SEXUAL RELATIONS: NO
CAN YOU RUN A SHORT DISTANCE: NO
CAN YOU DO MODERATE WORK AROUND THE HOUSE LIKE VACUUMING, SWEEPING FLOORS OR CARRYING GROCERIES: YES
CAN YOU WALK A BLOCK OR TWO ON LEVEL GROUND: YES
CAN YOU DO YARD WORK LIKE RAKING LEAVES, WEEDING OR PUSHING A MOWER: NO
CAN YOU TAKE CARE OF YOURSELF (EAT, DRESS, BATHE, OR USE TOILET): YES
CAN YOU CLIMB A FLIGHT OF STAIRS OR WALK UP A HILL: YES

## 2025-04-03 NOTE — PROGRESS NOTES
Cardiac Rehabilitation Individual Treatment Plan  -   Initial Treatment Plan    Name: Sravan Christiansen  Medical Record Number: [id number]  YOB: 1930  Age: 89 y.o.    Today’s Date: 4/3/2025  Primary Care Physician: Gerardo Marie DO  Referring Physician: Lino Valdez MD  Program Location: [facility]  Primary Diagnosis:  CHF    1. New onset atrial fibrillation (Multi)  Referral to Cardiac Rehab      2. Acute on chronic combined systolic (congestive) and diastolic (congestive) heart failure  Referral to Cardiac Rehab      3. Atrial flutter, unspecified type (Multi)  Referral to Cardiac Rehab      4. Chronic combined systolic and diastolic HF (heart failure), NYHA class 1  Referral to Cardiac Rehab      5. Coronary artery disease of native artery of native heart with stable angina pectoris  Referral to Cardiac Rehab           [facility] CR/MS/VR Session # attended:    Initial Assessment, not yet started program.    AACVPR Risk Stratification: High     Falls Risk: Medium    Psychosocial Assessment     No data recorded  Pre PH-Q 9 survey score: Survey given. Pending completion and return from patient.  Post PH-Q 9 survey score: To be done at discharge.    Sent PH-Q 9 to MD if score > 20: Survey not yet completed.    Pt reported/currently experiencing stress: Yes (states he uses Ativan daily at bedtime as needed to help relax for sleep)  Patient uses stress management skills: No   History of: anxiety  Currently seeing a mental health provider: No, Managed by PCP  Social Support: Yes, Whom:   Son & Grandchildren  Quality of Life Survey: SF-36   SF-36 Pre Post   Physical Functioning Score TBD TBD   Role Limits-Physical Score TBD TBD   General Health Score TBD TBD     Knowledge Assessment/Survey  Pre survey score: Survey given. Pending completion and return from patient.  Post survey score: To be done at discharge.    Learning Assessment:  Learning assessment/barriers: None  Preferred learning  "method: Auditory, Visual, and Writing handout  Barriers: None, denies (Pt to complete 18 sessions due to $40 CoPay/session)    Comments: Readiness to Learn:  Ready and Willing to learn    Stages of Change:  Preparation    Psychosocial Plan    Goal Status: Initial Assessment; goals not yet started    Psychosocial Goals:   Maintain or improve PHQ-9 Survey score by completion of program.  Maintain or improve Post SF-36 Quality of Life Survey by completion of program.     Psychosocial Interventions/Education:   TO BE COMPLETED DURING CARDIAC REHAB PROGRAM    [facility] AMB CR/[place]/VR Psychosocial  -  Initial Assessment: Program not yet started      Nutrition Assessment:    Hyperlipidemia: Yes     Lipids:   Lab Results   Component Value Date    CHOL 130 05/06/2024    HDL 56.9 05/06/2024    LDLF 46 05/04/2023    TRIG 67 05/06/2024       Current Dietary Guidelines:  Pt current following regular diet  Barriers to dietary change: no, denies    Diet Habit Survey: [place] Dietary Assessment.  Pre survey score: Initial survey given. Pending completion and return from patient.  Post survey score: To be done at discharge.    Diabetes Assessment    No results found for: \"HGBA1C\"    History of Diabetes: No    Weight Management    PRE information as stated by patient during initial phone interview  Height: 160 cm (5' 3\")  Weight: 52.2 kg (115 lb)  BMI (Calculated): 20.38  PRE Waist Cir:  TBD upon completion first session       POST  Wt:    TBD                 BMI:  TBD  POST Waist Cir:  To be done at discharge         Nutrition Plan    Goal Status: Initial Assessment; goals not yet started    Nutrition Goals:   Achieve a score of 80% or greater on Post [place] Dietary Assessment.  Attend at least 50% or more of Education classes provided by completion of program.  Meet with dietitian for Outpatient Nutrition Therapy by completion of program.    Nutrition Interventions/Education:   AHA handout \"What is Angina\"? And \"What is Cardiac " "Rehabilitation\"?  Lipid profile reviewed with patient during initial phone interview.  Patient to be provided referral for Outpatient Nutrition Therapy on first day.     AMB CR/LA/VR Nutrition  -  Initial Assessment: Program not yet started      Exercise Assessment    Current Home Exercise Prior to Program Start: None  Mode: Sedentary  Frequency: 0 days / week  Duration: 0 min / day    Exercise Prescription     Exercise Prescription based on: Duke Activity Status Index (DASI) and Health History    DASI Score: 18.95   MET Score: 5.1 (Score divided by 2)     Frequency:  2 days/week   Mode: Treadmill, NuStep /NuStep (T), Airdyne, Arm Ergometer, Recumbent Bike   Duration: 30-45 total aerobic minutes   Intensity: RPE 11-15  Target HR:  Rest +30, To be calculated after 6-12 attended sessions  MET Level: 2.5 max starting  Patient wears supplemental O2: No  SpO2 Range: 100 to 92 %     Modality Workload METs Duration (minutes)   1   Pre-Exercise /Rest -- -- 5: 00   2   NuStep 4000 38 sanon, load 2  2.3 10 :00   3   NuStep T5  18 sanon, load 2  2.3 10 :00   4   Treadmill Walk 1.0 mph, 0% incline  1.8 10 :00   5   Airdyne 0.2 load, 10-15 Rpm  1.5 10 :00   6   Arm Ergometer 14 sanon, level 1 2.4 10: 00   7   Recumbent Bike 1 Load, 40 Rpm 1.3 10: 00   8   Post-Exercise/ Rest -- -- 5: 00     Resistance Training: No   Home Exercise Prescription given: To be given prior to discharge from program.    Exercise Plan    Goal Status: Initial Assessment; goals not yet started    Exercise Goals:   150 min/week of moderate intensity aerobic exercise  Exercise Frequency 5-7 days a week.    Exercise Progression:  (Pt to complete 18 sessions due to $40 CoPay/session)  TO INCREASE MET LEVEL BY 5-10% EACH WEEK  TO INCREASE TOTAL EXERCISE DURATION TO 30-45 MINS    Exercise Interventions/Education:   TO BE COMPLETED DURING CARDIAC REHAB PROGRAM     AMB CR/LA/VR Exercise  -  Initial Assessment: Program not yet started      Other Core " "Components/Risk Factor Assessment:    Medication adherence  Current Medications:   ELIQUIS 2.5 mg twice a day  Dapagliflozin propanediol (Farxiga) 10 mg Daily  furosemide (LASIX) 20 mg tablet (2 tabs=40 mg) twice a day  metoprolol succinate ER (TOPROL XL) 25 mg 24 hr tablet Daily  LORazepam (ATIVAN) 0.5 mg Take 0.5 mg by mouth once daily as needed.  rosuvastatin (CRESTOR) 5 mg tablet Take 5 mg by mouth once daily.  lisinopril (ZESTRIL, PRINIVIL) 10 mg Daily     Allergies: Penicillin: Rash/swelling                Medication compliance: Yes   Uses pill box/organizer: Yes    Carries medication list: Yes     Blood Pressure Management  History of Hypertension: Yes   Medication Changes: No   Resting BP:  There were no vitals taken for this visit.   Last recorded Vitals documented in prior office visit 3/20/25   BP  116 / 68    Heart Failure Management  Hx of Heart Failure: Yes  Most recent EF: 20-25%      Last heart failure hospitalization: 1/2025  Number of HF admissions in last year: 1    Does patient obtain daily weight Yes       Heart Failure Plan    Goal Status: Initial Assessment; goals not yet started    Heart Failure Goals:   Pt able to verbalize when to contact MD with HF symptoms  Follow a low sodium diet  Obtain daily weights    Heart Failure Interventions/Education:   Provide patient handout daily tracker for weight, Controlling Heart Failure at home, and Heart Failure handout by American [employer] Association \"What is Heart Failure?\" on 1st day.  TO BE COMPLETED DURING CARDIAC REHAB PROGRAM    [facility] AMB CR/IA/VR Heart Failure  -  Initial Assessment: Program not yet started      Smoking/Tobacco Assessment  Social History     Tobacco Use   Smoking Status Never   Smokeless Tobacco Never   Quit Date verified with patient:  1/1/1953  Other forms of tobacco:  None, Denies          Anyone in the home smoke: No, Denies      Other Core Component Plan    Goal Status: Initial Assessment; goals not yet started    Other Core " "Component Goals:   Medication compliance established during initial phone interview.  Pt carries updated medication list with them at all times  Resting BP <130/80    Other Core Component Interventions/Education:   Patient given AHA educational handout \"How Do I Manage My Medications\"?  Resting BP readings taken pre and post exercise at each session.    [facility] AMB CR/AL/VR Other Core Component  -  Initial Assessment: Program not yet started      Individual Patient Goals:    Establish exercise routine 3-5 days/week, 30-60 mins/day by completion of program.  Goal Status: Initial Assessment; goals not yet started    To increase strength and endurance with ADL's, walk perimeter of grocery store with less fatigue by completion of program.  Goal Status: Initial Assessment; goals not yet started    Meet with Dietitian for Out Patient Nutrition therapy by completion of program.  Goal Status: Initial Assessment; goals not yet started      Staff Comments:  Patient scheduled to begin program.    Rehab Staff Signature: Kathia Reyes RN      PHYSICIAN REVIEW AND RESPONSE:  Please check appropriate boxes and sign     __X_ The participant may enroll in the Cardiac Rehabilitation Program with the above individualized treatment plan (ITP)  ____ Defer exercise guidelines and outcomes per department policy and protocol  ____ Make the following changes to the ITP/Exercise Prescription:    "

## 2025-04-04 ENCOUNTER — PATIENT OUTREACH (OUTPATIENT)
Dept: PRIMARY CARE | Facility: CLINIC | Age: OVER 89
End: 2025-04-04
Payer: MEDICARE

## 2025-04-07 ENCOUNTER — APPOINTMENT (OUTPATIENT)
Dept: OTOLARYNGOLOGY | Facility: CLINIC | Age: OVER 89
End: 2025-04-07
Payer: MEDICARE

## 2025-04-09 ENCOUNTER — CLINICAL SUPPORT (OUTPATIENT)
Dept: CARDIAC REHAB | Facility: HOSPITAL | Age: OVER 89
End: 2025-04-09
Payer: MEDICARE

## 2025-04-09 VITALS
DIASTOLIC BLOOD PRESSURE: 58 MMHG | OXYGEN SATURATION: 99 % | SYSTOLIC BLOOD PRESSURE: 104 MMHG | WEIGHT: 115.6 LBS | BODY MASS INDEX: 20.48 KG/M2 | HEIGHT: 63 IN

## 2025-04-09 DIAGNOSIS — I50.9 HEART FAILURE, UNSPECIFIED HF CHRONICITY, UNSPECIFIED HEART FAILURE TYPE: ICD-10-CM

## 2025-04-09 PROCEDURE — 93798 PHYS/QHP OP CAR RHAB W/ECG: CPT | Performed by: INTERNAL MEDICINE

## 2025-04-10 ENCOUNTER — APPOINTMENT (OUTPATIENT)
Dept: OTOLARYNGOLOGY | Facility: CLINIC | Age: OVER 89
End: 2025-04-10
Payer: MEDICARE

## 2025-04-14 ENCOUNTER — CLINICAL SUPPORT (OUTPATIENT)
Dept: CARDIAC REHAB | Facility: HOSPITAL | Age: OVER 89
End: 2025-04-14
Payer: MEDICARE

## 2025-04-14 DIAGNOSIS — I50.9 HEART FAILURE, UNSPECIFIED HF CHRONICITY, UNSPECIFIED HEART FAILURE TYPE: ICD-10-CM

## 2025-04-14 PROCEDURE — 93798 PHYS/QHP OP CAR RHAB W/ECG: CPT | Performed by: INTERNAL MEDICINE

## 2025-04-15 ENCOUNTER — APPOINTMENT (OUTPATIENT)
Dept: PRIMARY CARE | Facility: CLINIC | Age: OVER 89
End: 2025-04-15
Payer: MEDICARE

## 2025-04-15 VITALS
HEIGHT: 63 IN | HEART RATE: 54 BPM | OXYGEN SATURATION: 96 % | DIASTOLIC BLOOD PRESSURE: 63 MMHG | SYSTOLIC BLOOD PRESSURE: 110 MMHG | BODY MASS INDEX: 20.8 KG/M2 | TEMPERATURE: 97.5 F | WEIGHT: 117.4 LBS

## 2025-04-15 DIAGNOSIS — N18.31 STAGE 3A CHRONIC KIDNEY DISEASE (MULTI): ICD-10-CM

## 2025-04-15 DIAGNOSIS — M19.041 PRIMARY OSTEOARTHRITIS OF RIGHT HAND: ICD-10-CM

## 2025-04-15 DIAGNOSIS — F41.8 SITUATIONAL ANXIETY: ICD-10-CM

## 2025-04-15 DIAGNOSIS — M79.641 PAIN IN RIGHT HAND: Primary | ICD-10-CM

## 2025-04-15 PROCEDURE — 1036F TOBACCO NON-USER: CPT | Performed by: FAMILY MEDICINE

## 2025-04-15 PROCEDURE — 3078F DIAST BP <80 MM HG: CPT | Performed by: FAMILY MEDICINE

## 2025-04-15 PROCEDURE — G2211 COMPLEX E/M VISIT ADD ON: HCPCS | Performed by: FAMILY MEDICINE

## 2025-04-15 PROCEDURE — 1157F ADVNC CARE PLAN IN RCRD: CPT | Performed by: FAMILY MEDICINE

## 2025-04-15 PROCEDURE — 3074F SYST BP LT 130 MM HG: CPT | Performed by: FAMILY MEDICINE

## 2025-04-15 PROCEDURE — 99213 OFFICE O/P EST LOW 20 MIN: CPT | Performed by: FAMILY MEDICINE

## 2025-04-15 PROCEDURE — 1160F RVW MEDS BY RX/DR IN RCRD: CPT | Performed by: FAMILY MEDICINE

## 2025-04-15 PROCEDURE — 1126F AMNT PAIN NOTED NONE PRSNT: CPT | Performed by: FAMILY MEDICINE

## 2025-04-15 PROCEDURE — 1159F MED LIST DOCD IN RCRD: CPT | Performed by: FAMILY MEDICINE

## 2025-04-15 RX ORDER — PREDNISONE 10 MG/1
TABLET ORAL
Qty: 30 TABLET | Refills: 0 | Status: SHIPPED | OUTPATIENT
Start: 2025-04-15

## 2025-04-15 ASSESSMENT — ENCOUNTER SYMPTOMS
DEPRESSION: 0
ARTHRALGIAS: 1
OCCASIONAL FEELINGS OF UNSTEADINESS: 0
CONSTITUTIONAL NEGATIVE: 1
LOSS OF SENSATION IN FEET: 0

## 2025-04-15 ASSESSMENT — PAIN SCALES - GENERAL: PAINLEVEL_OUTOF10: 0-NO PAIN

## 2025-04-15 ASSESSMENT — PATIENT HEALTH QUESTIONNAIRE - PHQ9
2. FEELING DOWN, DEPRESSED OR HOPELESS: NOT AT ALL
1. LITTLE INTEREST OR PLEASURE IN DOING THINGS: NOT AT ALL
SUM OF ALL RESPONSES TO PHQ9 QUESTIONS 1 AND 2: 0

## 2025-04-15 NOTE — PROGRESS NOTES
"Subjective   Patient ID: Sravan Christiansen is a 89 y.o. male who presents for C/O (RIGHT HAND PROBLEMS X 2 MONTHS ).    HPI     Review of Systems   Constitutional: Negative.    Musculoskeletal:  Positive for arthralgias.        Pain in his right hand across his metacarpals       Objective   /63 (BP Location: Right arm)   Pulse 54   Temp 36.4 °C (97.5 °F) (Oral)   Ht 1.6 m (5' 3\")   Wt 53.3 kg (117 lb 6.4 oz)   SpO2 96%   BMI 20.80 kg/m²     Physical Exam  Vitals and nursing note reviewed.   Constitutional:       Appearance: Normal appearance.   Cardiovascular:      Heart sounds: Normal heart sounds.   Pulmonary:      Breath sounds: Normal breath sounds.   Musculoskeletal:      Comments: Tenderness right hand diffusely no evidence of any swelling no history of any trauma.  He was seen in the urgent care for this x-rays were positive for only osteoarthritis   Neurological:      General: No focal deficit present.      Mental Status: He is alert and oriented to person, place, and time.   Psychiatric:         Mood and Affect: Mood normal.         Behavior: Behavior normal.         Assessment/Plan patient seen here with some ongoing osteoarthritis in his right hand with prolonged slow prednisone taper.  Will see him back as needed  Problem List Items Addressed This Visit             ICD-10-CM    Situational anxiety F41.8    Pain in right hand - Primary M79.641     Other Visit Diagnoses         Codes    Primary osteoarthritis of right hand     M19.041    Relevant Medications    predniSONE (Deltasone) 10 mg tablet               "

## 2025-04-16 ENCOUNTER — CLINICAL SUPPORT (OUTPATIENT)
Dept: CARDIAC REHAB | Facility: HOSPITAL | Age: OVER 89
End: 2025-04-16
Payer: MEDICARE

## 2025-04-16 DIAGNOSIS — I50.9 HEART FAILURE, UNSPECIFIED HF CHRONICITY, UNSPECIFIED HEART FAILURE TYPE: ICD-10-CM

## 2025-04-16 LAB
ALBUMIN SERPL-MCNC: 4.6 G/DL (ref 3.6–5.1)
BUN SERPL-MCNC: 52 MG/DL (ref 7–25)
BUN/CREAT SERPL: 30 (CALC) (ref 6–22)
CALCIUM SERPL-MCNC: 9.5 MG/DL (ref 8.6–10.3)
CHLORIDE SERPL-SCNC: 100 MMOL/L (ref 98–110)
CO2 SERPL-SCNC: 29 MMOL/L (ref 20–32)
CREAT SERPL-MCNC: 1.76 MG/DL (ref 0.7–1.22)
EGFRCR SERPLBLD CKD-EPI 2021: 35 ML/MIN/1.73M2
GLUCOSE SERPL-MCNC: 87 MG/DL (ref 65–99)
PHOSPHATE SERPL-MCNC: 4.8 MG/DL (ref 2.1–4.3)
POTASSIUM SERPL-SCNC: 4.5 MMOL/L (ref 3.5–5.3)
SODIUM SERPL-SCNC: 140 MMOL/L (ref 135–146)

## 2025-04-16 PROCEDURE — 93798 PHYS/QHP OP CAR RHAB W/ECG: CPT | Performed by: INTERNAL MEDICINE

## 2025-04-21 ENCOUNTER — CLINICAL SUPPORT (OUTPATIENT)
Dept: CARDIAC REHAB | Facility: HOSPITAL | Age: OVER 89
End: 2025-04-21
Payer: MEDICARE

## 2025-04-21 DIAGNOSIS — I50.9 HEART FAILURE, UNSPECIFIED HF CHRONICITY, UNSPECIFIED HEART FAILURE TYPE: ICD-10-CM

## 2025-04-21 PROCEDURE — 93798 PHYS/QHP OP CAR RHAB W/ECG: CPT | Performed by: INTERNAL MEDICINE

## 2025-04-22 ENCOUNTER — APPOINTMENT (OUTPATIENT)
Dept: CARDIAC REHAB | Facility: HOSPITAL | Age: OVER 89
End: 2025-04-22
Payer: MEDICARE

## 2025-04-22 ENCOUNTER — TELEPHONE (OUTPATIENT)
Dept: CARDIOLOGY | Facility: CLINIC | Age: OVER 89
End: 2025-04-22

## 2025-04-22 DIAGNOSIS — I50.42 CHRONIC COMBINED SYSTOLIC AND DIASTOLIC HF (HEART FAILURE), NYHA CLASS 1: ICD-10-CM

## 2025-04-22 RX ORDER — FUROSEMIDE 20 MG/1
20 TABLET ORAL
Qty: 180 TABLET | Refills: 3 | Status: SHIPPED | OUTPATIENT
Start: 2025-04-22 | End: 2026-04-22

## 2025-04-22 NOTE — TELEPHONE ENCOUNTER
He is supposed to take two lasix pills in the morning and the afternoon as he was under the impression that is what Dr Valdez wanted. The bottle says to take only one in the morning and afternoon.  Can you clarify what he should do? Please call and advise.

## 2025-04-23 ENCOUNTER — CLINICAL SUPPORT (OUTPATIENT)
Dept: CARDIAC REHAB | Facility: HOSPITAL | Age: OVER 89
End: 2025-04-23
Payer: MEDICARE

## 2025-04-23 DIAGNOSIS — I50.9 HEART FAILURE, UNSPECIFIED HF CHRONICITY, UNSPECIFIED HEART FAILURE TYPE: ICD-10-CM

## 2025-04-23 PROCEDURE — 93798 PHYS/QHP OP CAR RHAB W/ECG: CPT | Performed by: INTERNAL MEDICINE

## 2025-04-28 ENCOUNTER — CLINICAL SUPPORT (OUTPATIENT)
Dept: CARDIAC REHAB | Facility: HOSPITAL | Age: OVER 89
End: 2025-04-28
Payer: MEDICARE

## 2025-04-28 DIAGNOSIS — I50.9 HEART FAILURE, UNSPECIFIED HF CHRONICITY, UNSPECIFIED HEART FAILURE TYPE: ICD-10-CM

## 2025-04-28 PROCEDURE — 93798 PHYS/QHP OP CAR RHAB W/ECG: CPT | Performed by: INTERNAL MEDICINE

## 2025-05-05 ENCOUNTER — CLINICAL SUPPORT (OUTPATIENT)
Dept: CARDIAC REHAB | Facility: HOSPITAL | Age: OVER 89
End: 2025-05-05
Payer: MEDICARE

## 2025-05-05 ENCOUNTER — TELEPHONE (OUTPATIENT)
Dept: CARDIOLOGY | Facility: CLINIC | Age: OVER 89
End: 2025-05-05
Payer: MEDICARE

## 2025-05-05 DIAGNOSIS — I50.42 CHRONIC COMBINED SYSTOLIC AND DIASTOLIC HF (HEART FAILURE), NYHA CLASS 1: ICD-10-CM

## 2025-05-05 DIAGNOSIS — I50.9 HEART FAILURE, UNSPECIFIED HF CHRONICITY, UNSPECIFIED HEART FAILURE TYPE: ICD-10-CM

## 2025-05-05 RX ORDER — DAPAGLIFLOZIN 10 MG/1
10 TABLET, FILM COATED ORAL DAILY
Qty: 90 TABLET | Refills: 3 | Status: SHIPPED | OUTPATIENT
Start: 2025-05-05

## 2025-05-05 NOTE — TELEPHONE ENCOUNTER
"Pt called today. Pt states that he attends a \"Heart Health\" class on Mondays and Wednesdays, BP is monitored. The last few times that his BP was checked, they couldn't get higher than 88/60. He is on BP medication. Pt states that he is feeling good. Next Ov with Dr Cardozo is 5/15/25. Pt does not typically monitor BP at home, but will start and bring to next OV.    Confirmed meds: Farxiga 10 daily, Lasix 20 bid, Lisinopril 5 daily, Toprol  25 daily.   "

## 2025-05-07 ENCOUNTER — CLINICAL SUPPORT (OUTPATIENT)
Dept: CARDIAC REHAB | Facility: HOSPITAL | Age: OVER 89
End: 2025-05-07
Payer: MEDICARE

## 2025-05-07 DIAGNOSIS — I50.9 HEART FAILURE, UNSPECIFIED HF CHRONICITY, UNSPECIFIED HEART FAILURE TYPE: ICD-10-CM

## 2025-05-07 PROCEDURE — 93798 PHYS/QHP OP CAR RHAB W/ECG: CPT | Performed by: INTERNAL MEDICINE

## 2025-05-09 ENCOUNTER — APPOINTMENT (OUTPATIENT)
Dept: PRIMARY CARE | Facility: CLINIC | Age: OVER 89
End: 2025-05-09
Payer: MEDICARE

## 2025-05-12 ENCOUNTER — APPOINTMENT (OUTPATIENT)
Dept: OTOLARYNGOLOGY | Facility: CLINIC | Age: OVER 89
End: 2025-05-12
Payer: MEDICARE

## 2025-05-12 ENCOUNTER — CLINICAL SUPPORT (OUTPATIENT)
Dept: CARDIAC REHAB | Facility: HOSPITAL | Age: OVER 89
End: 2025-05-12
Payer: MEDICARE

## 2025-05-12 DIAGNOSIS — I50.9 HEART FAILURE, UNSPECIFIED HF CHRONICITY, UNSPECIFIED HEART FAILURE TYPE: ICD-10-CM

## 2025-05-12 PROCEDURE — 93798 PHYS/QHP OP CAR RHAB W/ECG: CPT | Performed by: INTERNAL MEDICINE

## 2025-05-14 ENCOUNTER — CLINICAL SUPPORT (OUTPATIENT)
Dept: CARDIAC REHAB | Facility: HOSPITAL | Age: OVER 89
End: 2025-05-14
Payer: MEDICARE

## 2025-05-14 DIAGNOSIS — I50.9 HEART FAILURE, UNSPECIFIED HF CHRONICITY, UNSPECIFIED HEART FAILURE TYPE: ICD-10-CM

## 2025-05-14 PROCEDURE — 93798 PHYS/QHP OP CAR RHAB W/ECG: CPT | Performed by: INTERNAL MEDICINE

## 2025-05-15 ENCOUNTER — OFFICE VISIT (OUTPATIENT)
Dept: CARDIOLOGY | Facility: CLINIC | Age: OVER 89
End: 2025-05-15
Payer: MEDICARE

## 2025-05-15 VITALS
DIASTOLIC BLOOD PRESSURE: 74 MMHG | SYSTOLIC BLOOD PRESSURE: 108 MMHG | OXYGEN SATURATION: 98 % | HEART RATE: 67 BPM | HEIGHT: 63 IN | BODY MASS INDEX: 21.79 KG/M2 | WEIGHT: 123 LBS

## 2025-05-15 DIAGNOSIS — I50.22 CHRONIC SYSTOLIC (CONGESTIVE) HEART FAILURE: Primary | ICD-10-CM

## 2025-05-15 DIAGNOSIS — I71.21 ANEURYSM OF ASCENDING AORTA WITHOUT RUPTURE: ICD-10-CM

## 2025-05-15 DIAGNOSIS — I10 ESSENTIAL HYPERTENSION: ICD-10-CM

## 2025-05-15 DIAGNOSIS — E78.2 HYPERLIPIDEMIA, MIXED: ICD-10-CM

## 2025-05-15 DIAGNOSIS — Z95.2 S/P TAVR (TRANSCATHETER AORTIC VALVE REPLACEMENT): ICD-10-CM

## 2025-05-15 DIAGNOSIS — I25.118 CORONARY ARTERY DISEASE OF NATIVE ARTERY OF NATIVE HEART WITH STABLE ANGINA PECTORIS: ICD-10-CM

## 2025-05-15 DIAGNOSIS — Z95.5 PRESENCE OF STENT IN CORONARY ARTERY: ICD-10-CM

## 2025-05-15 DIAGNOSIS — I48.3 TYPICAL ATRIAL FLUTTER (MULTI): ICD-10-CM

## 2025-05-15 DIAGNOSIS — R00.2 PALPITATIONS: ICD-10-CM

## 2025-05-15 PROCEDURE — 1036F TOBACCO NON-USER: CPT | Performed by: INTERNAL MEDICINE

## 2025-05-15 PROCEDURE — 3074F SYST BP LT 130 MM HG: CPT | Performed by: INTERNAL MEDICINE

## 2025-05-15 PROCEDURE — 3078F DIAST BP <80 MM HG: CPT | Performed by: INTERNAL MEDICINE

## 2025-05-15 PROCEDURE — 99214 OFFICE O/P EST MOD 30 MIN: CPT | Performed by: INTERNAL MEDICINE

## 2025-05-15 PROCEDURE — 1159F MED LIST DOCD IN RCRD: CPT | Performed by: INTERNAL MEDICINE

## 2025-05-15 PROCEDURE — 99213 OFFICE O/P EST LOW 20 MIN: CPT | Performed by: INTERNAL MEDICINE

## 2025-05-15 NOTE — PROGRESS NOTES
"Subjective   Sravan Christiansen is a 89 y.o. male.    Chief Complaint:  Follow-up transaortic valve replacement.  General advised fatigue and weakness.  Exertional dyspnea.    HPI    He is currently participating in a rehab program.  Feels that it is helping him out.  Able to modestly improve his stamina.  Does do okay at baseline but fatigues quickly.  Denies shortness of breath orthopnea or paroxysmal nocturnal dyspnea.  No chest pressure or heaviness.  Continues to live independently.  Gets some food from the cafeteria after his workout.    The patient is status post transaortic valve replacement.  This was done on 2023.  A 34 mm valve was placed.     Cardiac catheterization performed on 2023 demonstrated mild coronary artery disease. Right heart catheterization demonstrated elevated pulmonary artery pressures and mildly elevated wedge pressure of 21.      The medical problems including history of bradycardia, hypertension, hyperlipidemia, and a past smoking history.     His wife  a few years ago.      Allergies  Medication    · Penicillins     Family History  Mother    · Family history of cerebral aneurysm (V17.1) (Z82.49)  Brother    · Family history of cardiac disorder (V17.49) (Z82.49)     Social History  Problems    · Consumes alcohol occasionally (V49.89) (Z78.9)   · Former smoker (V15.82) (Z87.891)   · QUIT IN MID 20'S     Review of Systems   Constitutional: Positive for malaise/fatigue.   Cardiovascular:  Positive for dyspnea on exertion.   Musculoskeletal:  Positive for arthritis.   All other systems reviewed and are negative.    Current Medications[1]     Visit Vitals  /74 (BP Location: Right arm)   Pulse 67   Ht 1.6 m (5' 3\")   Wt 55.8 kg (123 lb)   SpO2 98%   BMI 21.79 kg/m²   Smoking Status Never   BSA 1.57 m²        Objective     Constitutional:       Appearance: Not in distress.   Neck:      Vascular: JVD normal.   Pulmonary:      Breath sounds: Normal breath sounds. "   Cardiovascular:      Normal rate. Regular rhythm. S1 with normal intensity. S2 with normal intensity.       Murmurs: There is a grade 2/6 systolic murmur.      No gallop.    Pulses:     Intact distal pulses.   Edema:     Peripheral edema absent.   Abdominal:      General: Bowel sounds are normal.   Neurological:      Mental Status: Alert and oriented to person, place and time.         Lab Review:   Lab Results   Component Value Date     04/15/2025    K 4.5 04/15/2025     04/15/2025    CO2 29 04/15/2025    BUN 52 (H) 04/15/2025    CREATININE 1.76 (H) 04/15/2025    GLUCOSE 87 04/15/2025    CALCIUM 9.5 04/15/2025     Lab Results   Component Value Date    CHOL 130 05/06/2024    TRIG 67 05/06/2024    HDL 56.9 05/06/2024       Assessment:    1.  Coronary disease.  Mild coronary disease with no anginal symptoms.    2.  Status post transaortic valve replacement.  His latest echocardiographic study demonstrates a normally functioning bioprosthetic aortic valve with no significant perivalvular leaks.    3.  Systolic congestive heart failure.  Latest BNP is 2247.  However clinically he is not in heart failure.  Doing reasonably well with his workouts.  No symptoms of heart failure.  Oxygen saturation today is at 100%.  He is on lisinopril, metoprolol, Lasix, Farxiga.  I do not feel we can push other medications because of low blood pressure.    3.  Atrial flutter.  Controlled ventricular response.  He is on anticoagulation therapy.    4.  Hypertension.  Blood pressures are low.         [1]   Current Outpatient Medications   Medication Sig Dispense Refill    apixaban (Eliquis) 2.5 mg tablet Take 1 tablet (2.5 mg) by mouth 2 times a day. 180 tablet 3    Farxiga 10 mg tablet TAKE 1 TABLET DAILY 90 tablet 3    furosemide (Lasix) 20 mg tablet Take 1 tablet (20 mg) by mouth 2 times daily (morning and late afternoon). 180 tablet 3    lisinopril 5 mg tablet Take 1 tablet (5 mg) by mouth once daily. (Patient taking  differently: Take 2 tablets (10 mg) by mouth once daily.) 90 tablet 3    LORazepam (Ativan) 0.5 mg tablet Take 1 tablet (0.5 mg) by mouth once daily as needed for anxiety. 90 tablet 0    metoprolol succinate XL (Toprol-XL) 25 mg 24 hr tablet Take 1 tablet (25 mg) by mouth once daily. Do not crush or chew. 90 tablet 3    rosuvastatin (Crestor) 5 mg tablet TAKE 1 TABLET DAILY 90 tablet 3     No current facility-administered medications for this visit.

## 2025-05-15 NOTE — PATIENT INSTRUCTIONS
Take furosemide 20 mg twice daily.    Continue the rehab program.    We are going to do an echocardiogram on your next visit.

## 2025-05-19 ENCOUNTER — CLINICAL SUPPORT (OUTPATIENT)
Dept: CARDIAC REHAB | Facility: HOSPITAL | Age: OVER 89
End: 2025-05-19
Payer: MEDICARE

## 2025-05-19 DIAGNOSIS — I50.9 HEART FAILURE, UNSPECIFIED HF CHRONICITY, UNSPECIFIED HEART FAILURE TYPE: ICD-10-CM

## 2025-05-19 PROCEDURE — 93798 PHYS/QHP OP CAR RHAB W/ECG: CPT | Performed by: INTERNAL MEDICINE

## 2025-05-21 ENCOUNTER — CLINICAL SUPPORT (OUTPATIENT)
Dept: CARDIAC REHAB | Facility: HOSPITAL | Age: OVER 89
End: 2025-05-21
Payer: MEDICARE

## 2025-05-21 DIAGNOSIS — I50.9 HEART FAILURE, UNSPECIFIED HF CHRONICITY, UNSPECIFIED HEART FAILURE TYPE: ICD-10-CM

## 2025-05-21 PROCEDURE — 93798 PHYS/QHP OP CAR RHAB W/ECG: CPT | Performed by: INTERNAL MEDICINE

## 2025-05-23 ENCOUNTER — APPOINTMENT (OUTPATIENT)
Dept: CARDIAC REHAB | Facility: HOSPITAL | Age: OVER 89
End: 2025-05-23
Payer: MEDICARE

## 2025-05-26 ENCOUNTER — APPOINTMENT (OUTPATIENT)
Dept: RADIOLOGY | Facility: HOSPITAL | Age: OVER 89
End: 2025-05-26
Payer: MEDICARE

## 2025-05-26 ENCOUNTER — HOSPITAL ENCOUNTER (EMERGENCY)
Facility: HOSPITAL | Age: OVER 89
Discharge: HOME | End: 2025-05-26
Attending: STUDENT IN AN ORGANIZED HEALTH CARE EDUCATION/TRAINING PROGRAM
Payer: MEDICARE

## 2025-05-26 VITALS
OXYGEN SATURATION: 95 % | HEART RATE: 68 BPM | SYSTOLIC BLOOD PRESSURE: 138 MMHG | BODY MASS INDEX: 21.79 KG/M2 | TEMPERATURE: 98.8 F | WEIGHT: 123 LBS | RESPIRATION RATE: 20 BRPM | HEIGHT: 63 IN | DIASTOLIC BLOOD PRESSURE: 78 MMHG

## 2025-05-26 DIAGNOSIS — S49.92XA ARM INJURY, LEFT, INITIAL ENCOUNTER: ICD-10-CM

## 2025-05-26 DIAGNOSIS — S00.33XA CONTUSION OF NOSE, INITIAL ENCOUNTER: ICD-10-CM
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
## 2025-05-26 LAB
ABO GROUP (TYPE) IN BLOOD: NORMAL
ALBUMIN SERPL BCP-MCNC: 4.3 G/DL (ref 3.4–5)
ALP SERPL-CCNC: 68 U/L (ref 33–136)
ALT SERPL W P-5'-P-CCNC: 23 U/L (ref 10–52)
ANION GAP SERPL CALC-SCNC: 12 MMOL/L (ref 10–20)
ANTIBODY SCREEN: NORMAL
AST SERPL W P-5'-P-CCNC: 24 U/L (ref 9–39)
BASOPHILS # BLD AUTO: 0.05 X10*3/UL (ref 0–0.1)
BASOPHILS NFR BLD AUTO: 0.9 %
BILIRUB SERPL-MCNC: 1.3 MG/DL (ref 0–1.2)
BUN SERPL-MCNC: 35 MG/DL (ref 6–23)
CALCIUM SERPL-MCNC: 9.2 MG/DL (ref 8.6–10.3)
CHLORIDE SERPL-SCNC: 105 MMOL/L (ref 98–107)
CO2 SERPL-SCNC: 28 MMOL/L (ref 21–32)
CREAT SERPL-MCNC: 1.69 MG/DL (ref 0.5–1.3)
EGFRCR SERPLBLD CKD-EPI 2021: 37 ML/MIN/1.73M*2
EOSINOPHIL # BLD AUTO: 0.05 X10*3/UL (ref 0–0.4)
EOSINOPHIL NFR BLD AUTO: 0.9 %
ERYTHROCYTE [DISTWIDTH] IN BLOOD BY AUTOMATED COUNT: 14.8 % (ref 11.5–14.5)
GLUCOSE SERPL-MCNC: 110 MG/DL (ref 74–99)
HCT VFR BLD AUTO: 39.2 % (ref 41–52)
HGB BLD-MCNC: 12.4 G/DL (ref 13.5–17.5)
IMM GRANULOCYTES # BLD AUTO: 0.02 X10*3/UL (ref 0–0.5)
IMM GRANULOCYTES NFR BLD AUTO: 0.4 % (ref 0–0.9)
INR PPP: 1.4 (ref 0.9–1.1)
LYMPHOCYTES # BLD AUTO: 1.12 X10*3/UL (ref 0.8–3)
LYMPHOCYTES NFR BLD AUTO: 20.9 %
MCH RBC QN AUTO: 32.1 PG (ref 26–34)
MCHC RBC AUTO-ENTMCNC: 31.6 G/DL (ref 32–36)
MCV RBC AUTO: 102 FL (ref 80–100)
MONOCYTES # BLD AUTO: 0.56 X10*3/UL (ref 0.05–0.8)
MONOCYTES NFR BLD AUTO: 10.4 %
NEUTROPHILS # BLD AUTO: 3.57 X10*3/UL (ref 1.6–5.5)
NEUTROPHILS NFR BLD AUTO: 66.5 %
NRBC BLD-RTO: 0 /100 WBCS (ref 0–0)
PLATELET # BLD AUTO: 174 X10*3/UL (ref 150–450)
POTASSIUM SERPL-SCNC: 4.5 MMOL/L (ref 3.5–5.3)
PROT SERPL-MCNC: 6.8 G/DL (ref 6.4–8.2)
PROTHROMBIN TIME: 15.1 SECONDS (ref 9.8–12.4)
RBC # BLD AUTO: 3.86 X10*6/UL (ref 4.5–5.9)
RH FACTOR (ANTIGEN D): NORMAL
SODIUM SERPL-SCNC: 140 MMOL/L (ref 136–145)
WBC # BLD AUTO: 5.4 X10*3/UL (ref 4.4–11.3)

## 2025-05-26 PROCEDURE — 72125 CT NECK SPINE W/O DYE: CPT

## 2025-05-26 PROCEDURE — 70450 CT HEAD/BRAIN W/O DYE: CPT

## 2025-05-26 PROCEDURE — 70486 CT MAXILLOFACIAL W/O DYE: CPT

## 2025-05-26 PROCEDURE — 73090 X-RAY EXAM OF FOREARM: CPT | Mod: LT

## 2025-05-26 PROCEDURE — 76377 3D RENDER W/INTRP POSTPROCES: CPT

## 2025-05-26 PROCEDURE — 85025 COMPLETE CBC W/AUTO DIFF WBC: CPT

## 2025-05-26 PROCEDURE — 99285 EMERGENCY DEPT VISIT HI MDM: CPT | Mod: 25

## 2025-05-26 PROCEDURE — 99285 EMERGENCY DEPT VISIT HI MDM: CPT | Mod: 25 | Performed by: STUDENT IN AN ORGANIZED HEALTH CARE EDUCATION/TRAINING PROGRAM

## 2025-05-26 PROCEDURE — 86900 BLOOD TYPING SEROLOGIC ABO: CPT

## 2025-05-26 PROCEDURE — 80053 COMPREHEN METABOLIC PANEL: CPT

## 2025-05-26 PROCEDURE — 73090 X-RAY EXAM OF FOREARM: CPT | Mod: LEFT SIDE

## 2025-05-26 PROCEDURE — 73060 X-RAY EXAM OF HUMERUS: CPT | Mod: LEFT SIDE

## 2025-05-26 PROCEDURE — 36415 COLL VENOUS BLD VENIPUNCTURE: CPT

## 2025-05-26 PROCEDURE — 73080 X-RAY EXAM OF ELBOW: CPT | Mod: LT

## 2025-05-26 PROCEDURE — 73060 X-RAY EXAM OF HUMERUS: CPT | Mod: LT

## 2025-05-26 PROCEDURE — 73080 X-RAY EXAM OF ELBOW: CPT | Mod: LEFT SIDE

## 2025-05-26 PROCEDURE — 85610 PROTHROMBIN TIME: CPT

## 2025-05-26 ASSESSMENT — COLUMBIA-SUICIDE SEVERITY RATING SCALE - C-SSRS
1. IN THE PAST MONTH, HAVE YOU WISHED YOU WERE DEAD OR WISHED YOU COULD GO TO SLEEP AND NOT WAKE UP?: NO
2. HAVE YOU ACTUALLY HAD ANY THOUGHTS OF KILLING YOURSELF?: NO
6. HAVE YOU EVER DONE ANYTHING, STARTED TO DO ANYTHING, OR PREPARED TO DO ANYTHING TO END YOUR LIFE?: NO

## 2025-05-26 ASSESSMENT — LIFESTYLE VARIABLES
HAVE YOU EVER FELT YOU SHOULD CUT DOWN ON YOUR DRINKING: NO
HAVE PEOPLE ANNOYED YOU BY CRITICIZING YOUR DRINKING: NO
EVER HAD A DRINK FIRST THING IN THE MORNING TO STEADY YOUR NERVES TO GET RID OF A HANGOVER: NO
EVER FELT BAD OR GUILTY ABOUT YOUR DRINKING: NO
TOTAL SCORE: 0

## 2025-05-26 ASSESSMENT — PAIN DESCRIPTION - PAIN TYPE: TYPE: ACUTE PAIN

## 2025-05-26 ASSESSMENT — PAIN - FUNCTIONAL ASSESSMENT: PAIN_FUNCTIONAL_ASSESSMENT: 0-10

## 2025-05-26 NOTE — ED TRIAGE NOTES
Pt coming in after falling this morning. He was getting out of the shower and tripped and fell down. Hit his head, nose and left arm. Left upper arm appears to have a deformity. Pt is on eliquis. Denies any LOC. Good pulses in left wrist.

## 2025-05-26 NOTE — ED PROVIDER NOTES
"HPI   Chief Complaint   Patient presents with    Fall    Arm Injury    Facial Injury       89-year-old male presenting to the emergency department by EMS from home due to a mechanical fall.  Patient reports this morning he was in his bathroom \"washing up\" when he reached for a towel then tripped and fell onto the ground.  States he fell forward onto his face and onto his left arm. He did not lose consciousness. He is on Eliquis daily. Endorses bruising and mild pain in the left upper arm. Denies headache, neck pain, lightheadedness, dizziness, vision changes, shortness of breath, chest pain, abdominal pain, nausea, vomiting, changes in urinary or bowel habits, back pain, numbness/tingling. States he does live alone and is independent in ADLs.              Patient History   Medical History[1]  Surgical History[2]  Family History[3]  Social History[4]    Physical Exam   ED Triage Vitals [05/26/25 0834]   Temperature Heart Rate Respirations BP   36.6 °C (97.9 °F) 70 18 117/69      Pulse Ox Temp Source Heart Rate Source Patient Position   96 % Tympanic Monitor Sitting      BP Location FiO2 (%)     Right arm --       Physical Exam  Vitals and nursing note reviewed.   Constitutional:       General: He is not in acute distress.     Appearance: He is not ill-appearing or toxic-appearing.      Comments: Elderly, frail appearing   HENT:      Head:      Comments: Small abrasion to the left-side of the forehead. Swelling and ecchymosis to the nasal bridge.     Right Ear: Tympanic membrane, ear canal and external ear normal.      Left Ear: Tympanic membrane, ear canal and external ear normal.      Nose: Nose normal.      Mouth/Throat:      Mouth: Mucous membranes are moist.      Pharynx: Oropharynx is clear.   Eyes:      Extraocular Movements: Extraocular movements intact.      Conjunctiva/sclera: Conjunctivae normal.      Pupils: Pupils are equal, round, and reactive to light.   Cardiovascular:      Rate and Rhythm: Normal rate " and regular rhythm.   Pulmonary:      Effort: Pulmonary effort is normal.      Breath sounds: Normal breath sounds.   Chest:      Chest wall: No tenderness.   Abdominal:      General: Abdomen is flat. Bowel sounds are normal.      Palpations: Abdomen is soft.      Tenderness: There is no abdominal tenderness.   Musculoskeletal:      Cervical back: Normal range of motion and neck supple. No rigidity.      Comments: Ecchymosis present on the left forearm. Small hematoma and surrounding ecchymosis of the left proximal humerus, mild tenderness to palpation. Full active and passive ROM intact of LUE. No midspinal or paraspinal cervical, thoracic, or lumbar tenderness to palpation. No crepitus, step-offs, or obvious deformity. Compartments are soft. Sensation intact, upper and lower extremity pulses 2+ bilaterally.    Skin:     General: Skin is warm and dry.      Capillary Refill: Capillary refill takes less than 2 seconds.   Neurological:      General: No focal deficit present.      Mental Status: He is alert and oriented to person, place, and time.   Psychiatric:         Mood and Affect: Mood normal.           ED Course & MDM   Diagnoses as of 05/26/25 1202   Fall, initial encounter   Arm injury, left, initial encounter   Contusion of nose, initial encounter                 No data recorded     Marry Coma Scale Score: 15 (05/26/25 0838 : Gorge Alvarez RN)                           Medical Decision Making  89-year-old male presenting to the emergency department by EMS from home due to a mechanical fall. He did hit his head but did not lose consciousness. He is on Eliquis daily.  Vital signs reviewed and stable.  Patient is elderly and frail but overall well-appearing and not in any acute distress.  He is not ill or toxic in appearance.  A&O x 4.  Neurologically intact, no unilateral or focal deficits.  Patient denies needing any medications for pain at this time. CBC without leukocytosis, mild anemia present with  hemoglobin 12.4 and hematocrit 39.2 similar to patient's baseline. CMP remarkable for elevated BUN 35, creatinine 1.69, and eGFR 37 in the presence of CKD. XR left humerus without acute fracture or dislocation, subacromial space narrowing and pseudoarthrosis consistent with a  chronic rotator cuff tear noted. XR left forearm and elbow without acute fracture, dislocation, or soft tissue swelling. CT head negative for acute infarct, hemorrhage, or skull fracture. CT maxillofacial bones negative for acute facial bone fracture. Patient was informed of all lab and imaging findings, all questions and concerns were answered. He remains hemodynamically stable and neurologically intact. He continues to deny any pain and states he feels safe returning home. He was able to ambulate in the emergency department slowly but without evidence of gait disturbance. Strict return precautions were given to return to the emergency department with any new or worsening symptoms. Advised to follow-up with his primary care physician as soon as possible regarding today's emergency department visit. Patient was agreeable to plan of care and discharged home with a friend in stable condition.        Procedure  Procedures         [1]   Past Medical History:  Diagnosis Date    Abnormal result of other cardiovascular function study     Abnormal nuclear stress test    Bicipital tendinitis, left shoulder 07/07/2021    Biceps tendinitis, left    Bicipital tendinitis, right shoulder 03/04/2021    Biceps tendinitis, right    Pain in left hip 10/17/2019    Acute pain of left hip    Personal history of malignant neoplasm of prostate     History of malignant neoplasm of prostate    Personal history of other benign neoplasm 06/13/2018    History of other benign neoplasm    Personal history of other diseases of the circulatory system     History of cardiac disorder    Personal history of other diseases of the circulatory system     History of essential  hypertension    Personal history of other diseases of the circulatory system     History of aortic valve stenosis    Personal history of other diseases of the circulatory system     History of angina pectoris    Personal history of other diseases of the respiratory system 12/31/2018    History of acute bacterial sinusitis   [2]   Past Surgical History:  Procedure Laterality Date    ANKLE ARTHROSCOPY W/ OPEN REPAIR  02/18/2016    Ankle Repair    CATARACT EXTRACTION  06/07/2018    Cataract Surgery    COLONOSCOPY  04/13/2018    Colonoscopy    CORONARY ANGIOPLASTY WITH STENT PLACEMENT  02/18/2016    Cath Stent Placement    HERNIA REPAIR  02/18/2016    Hernia Repair    PROSTATE SURGERY  04/13/2018    Prostate Surgery   [3]   Family History  Problem Relation Name Age of Onset    Cerebral aneurysm Mother      Heart disease Brother     [4]   Social History  Tobacco Use    Smoking status: Never    Smokeless tobacco: Never   Substance Use Topics    Alcohol use: Yes     Comment: rare    Drug use: Never        Rachna Sal PA-C  05/27/25 0946

## 2025-05-26 NOTE — DISCHARGE INSTRUCTIONS
Recommend Tylenol as needed at home for pain.  You should elevate and ice the left arm twice daily.  Advised to schedule follow-up appointment as soon as possible with your primary care physician regarding today's emergency department visit.  Return to the emergency department with any new or worsening symptoms occluding worsening pain, lightheadedness, dizziness, headaches, vision changes, nausea, vomiting, shortness of breath, chest pain, subsequent falls or injuries.

## 2025-05-28 ENCOUNTER — CLINICAL SUPPORT (OUTPATIENT)
Dept: CARDIAC REHAB | Facility: HOSPITAL | Age: OVER 89
End: 2025-05-28
Payer: MEDICARE

## 2025-05-28 DIAGNOSIS — I50.9 HEART FAILURE, UNSPECIFIED HF CHRONICITY, UNSPECIFIED HEART FAILURE TYPE: ICD-10-CM

## 2025-05-28 PROCEDURE — 93798 PHYS/QHP OP CAR RHAB W/ECG: CPT | Performed by: INTERNAL MEDICINE

## 2025-06-02 ENCOUNTER — CLINICAL SUPPORT (OUTPATIENT)
Dept: CARDIAC REHAB | Facility: HOSPITAL | Age: OVER 89
End: 2025-06-02
Payer: MEDICARE

## 2025-06-02 DIAGNOSIS — I50.9 HEART FAILURE, UNSPECIFIED HF CHRONICITY, UNSPECIFIED HEART FAILURE TYPE: ICD-10-CM

## 2025-06-02 PROCEDURE — 93798 PHYS/QHP OP CAR RHAB W/ECG: CPT | Performed by: INTERNAL MEDICINE

## 2025-06-03 NOTE — PROGRESS NOTES
Cardiac Rehabilitation Individual Treatment Plan  - 60 Day Reassessment 6/6/2025    Name: Sravan Christiansen  Medical Record Number: [id number]  YOB: 1930  Age: 89 y.o.    Today’s Date: 4/9/2025,  (Original interview completed 4/3/25)  Primary Care Physician: Gerardo Marie DO  Referring Physician: Lino Valdez MD, Dr. Chris MD (as Medical Director)  Program Location: [facility] CARDIAC REHAB     General  Primary Diagnosis:  CHF    1. Heart failure, unspecified HF chronicity, unspecified heart failure type  Follow Up In Cardiac Rehab            CR/AL/VR 13 Session # attended.      Pt requested to complete 18 sessions due to $40 CoPay/session, scheduled to begin program 4/9/2025    AACVPR Risk Stratification:   High    Falls Risk: Medium  Patient instructed to attach safety clip to waist band on the first time he walked on the TM and at each session afterwards.    Psychosocial Assessment   Pre PH-Q 9 survey score: 0  Post PH-Q 9 survey score: To be done at discharge.    Sent PH-Q 9 to MD if score > 20: Survey not sent    Pt reported/currently experiencing stress: Yes (states he uses Ativan daily at bedtime as needed to help relax for sleep)  Patient uses stress management skills: No   History of: anxiety  Currently seeing a mental health provider: No, Managed by PCP  Social Support: Yes, Whom:   Son & Grandchildren  Quality of Life Survey: SF-36   SF-36 Pre Post   Physical Functioning Score 35.99 TBD   Role Limits-Physical Score 32.36 TBD   General Health Score 47.21 TBD     Knowledge Assessment/Survey  Pre survey score 10/15  Post survey score: To be done at discharge.    Learning Assessment:  Learning assessment/barriers: None  Preferred learning method: Auditory, Visual, and Writing handout  Barriers: None, denies (Pt to complete 18 sessions due to $40 CoPay/session)    Comments: Readiness to Learn:  Ready and Willing to learn    Stages of Change:  Action    Psychosocial Plan    Goal Status: In  "Progress    Psychosocial Goals:   Maintain or improve PHQ-9 Survey score by completion of program.  Maintain or improve Post SF-36 Quality of Life Survey by completion of program.     Psychosocial Interventions/Education:   Ph Q-9 reviewed and the Out patient counseling resource list provided.   Pt attended education on Stress and Relaxation.     AMB CR/IN/VR Psychosocial  - 60 Day Reassessment 6/6/2025    Nutrition Assessment:    Hyperlipidemia: Yes     Lipids:   Lab Results   Component Value Date    CHOL 130 05/06/2024    HDL 56.9 05/06/2024    LDLF 46 05/04/2023    TRIG 67 05/06/2024       Current Dietary Guidelines: Pt current following regular diet  Barriers to dietary change: no, denies    Diet Habit Survey: [place] Dietary Assessment.  Pre survey score: 67%  Post survey score: To be done at discharge.    Diabetes Assessment    No results found for: \"HGBA1C\"    History of Diabetes: No    Weight Management    PRE Program: Wt: 115.6 pounds   Ht:  63 inches   BMI: 20.48  PRE Waist Cir:  35.5 Inches     POST  Wt:    TBD                 BMI:  TBD  POST Waist Cir:  To be done at discharge         Nutrition Plan    Goal Status: In Progress    Nutrition Goals:   Achieve a score of 80% or greater on Post [place] Dietary Assessment.  Attend at least 50% or more of Education classes provided by completion of program.      Nutrition Interventions/Education:   AHA handout \"What is Angina\"? And \"What is Cardiac Rehabilitation\"?  Lipid profile reviewed with patient during initial phone interview.  [place] Score Reviewed. Healthy Eating tip sheet provided, highlighting areas where scores were the lowest.   Pt attended education on \"Grillin and Chillin\" (Dietitian),Diabetes Managment     AMB CR/IN/VR Nutrition  -  60 Day Reassessment 6/6/2025  Goal is 80% or greater    Exercise Assessment    Current Home Exercise Prior to Program Start: None  Mode: Sedentary  Frequency: 0 days / week  Duration: 0 min / day    Exercise " Prescription     Exercise Prescription based on: Duke Activity Status Index (DASI) and Health History    DASI Score: 18.95    MET Score: 5.0     Frequency:  2 days/week   Mode: Treadmill, NuStep /NuStep (T), Airdyne, Arm Ergometer, Recumbent Bike   Duration: 30-45 total aerobic minutes   Intensity: RPE 11-15  Target HR:  Rest +30, To be calculated after 6-12 attended sessions  MET Level: 2.5 max starting  Patient wears supplemental O2: No  SpO2 Range: 100 to 92 %     Modality Workload METs Duration (minutes)   1   Pre-Exercise /Rest -- -- 5: 00   2   NuStep 4000 50 sanon, Load 4 2.7 14 :00   3   NuStep T5  30 sanon, Load 4 2.7 14:00   4   Treadmill Walk 1.3 mph, 0.5% incline  2.1 14:00   5   Airdyne 0.6 load, 20-25 Rpm  2.5 14:00   6   Arm Ergometer 18 sanon, level 1 2.6 14: 00   7   Recumbent Bike 1 Load, 50 Rpm 1.3 14: 00   8   Post-Exercise/ Rest -- -- 5: 00     Resistance Training: No   Home Exercise Prescription given: To be given prior to discharge from program.    Exercise Plan    Goal Status: In Progress    Exercise Goals:   150 min/week of moderate intensity aerobic exercise  Exercise Frequency 5-7 days a week.    Exercise Progression:  (Pt to complete 18 sessions due to $40 CoPay/session)  Increased exercise duration by 1 minutes per modality for a total of 36 minutes.   Increased exercise intensity on AE/TM/NS/AD based on patient's HR/BP/RPE     Exercise Interventions/Education:   TO BE COMPLETED DURING CARDIAC REHAB PROGRAM    [facility] AMB CR/PA/VR Exercise  -  60 Day Reassessment 6/6/2025      Other Core Components/Risk Factor Assessment:    Medication adherence  Current Medications:   ELIQUIS 2.5 mg twice a day  Dapagliflozin propanediol (Farxiga) 10 mg Daily  furosemide (LASIX) 20 mg tablet (2 tabs=40 mg) twice a day  metoprolol succinate ER (TOPROL XL) 25 mg 24 hr tablet Daily  LORazepam (ATIVAN) 0.5 mg Take 0.5 mg by mouth once daily as needed.  rosuvastatin (CRESTOR) 5 mg tablet Take 5 mg by mouth once  "daily.  lisinopril (ZESTRIL, PRINIVIL) 10 mg Daily     Allergies: Penicillin: Rash/swelling                Medication compliance: Yes   Uses pill box/organizer: Yes    Carries medication list: Yes     Blood Pressure Management  History of Hypertension: Yes   Medication Changes: No   Resting BP:  There were no vitals taken for this visit.                         (See session reports for all V.S. documented during sessions/classes attended.)     Heart Failure Management  Hx of Heart Failure: Yes  Most recent EF: 20-25%      Last heart failure hospitalization: 1/2025  Number of HF admissions in last year: 1    Does patient obtain daily weight Yes       Heart Failure Plan    Goal Status: In Progress    Heart Failure Goals:   Pt able to verbalize when to contact MD with HF symptoms  Follow a low sodium diet  Obtain daily weights    Heart Failure Interventions/Education:   Provide patient handout daily tracker for weight, Controlling Heart Failure at home, and Heart Failure handout by American Heart Association \"What is Heart Failure?\" on 1st day.     AMB CR/TX/VR Heart Failure  -  60 Day Reassessment 6/6/2025      Smoking/Tobacco Assessment  Social History     Tobacco Use   Smoking Status Never   Smokeless Tobacco Never   Quit Date verified with patient:  1/1/1953  Other forms of tobacco:  None, Denies          Anyone in the home smoke: No, Denies      Other Core Component Plan    Goal Status: In Progress    Other Core Component Goals:   Medication compliance established during initial phone interview.  Pt carries updated medication list with them at all times  Resting BP <130/80    Other Core Component Interventions/Education:   Patient given [facility] educational handout \"How Do I Manage My Medications\"?  Resting BP readings taken pre and post exercise at each session.  Pt attended education on CAD Risk Factors.     AMB CR/TX/VR Other Core Component  -  60 Day Reassessment 6/6/2025      Individual Patient " Goals:    Establish exercise routine 3-5 days/week, 30-60 mins/day by completion of program.        Goal Status: In Progress  To increase strength and endurance with ADL's, walk perimeter of grocery store with less fatigue by completion of program.        Goal Status: In Progress  Meet with Dietitian for Out Patient Nutrition therapy by completion of program.        Goal Status: In Progress    Staff Comments:  Pt has done well in the program so far.  He is tolerating gradually increasing workloads.  No issues or complaints to note.  Will continue to progress as he can tolerate.    Educational Classes:  CAD Risk Factors 04-09/2025, Grillin and Chillin (Dietitian) 04/16/2025, Stress and Relaxation 4/23/25, Diabetes Management 5/7/25, Cardiac Medication(Pharmacy)5/14/25, Smoking Cessation 5/21/25, Hypertension 5/28/25    Cardiac Related ER Visits: 0             Hospital Admits: 0        The Participant my continue in the Cardiac Rehabilitation Program with the above individualized treatment plan (ITP).    PHYSICIAN REVIEW AND RESPONSE: (See Physician electronic signature)

## 2025-06-03 NOTE — PROGRESS NOTES
Cardiac Rehabilitation {UH CR/SD/VR NOTE TYPE:97137}    Name: Sravan Christiansen  Medical Record Number: [id number]  YOB: 1930  Age: 89 y.o.    Today’s Date: 6/3/2025  Primary Care Physician: Gerardo Marie DO  Referring Physician: Lino Valdez MD  Program Location: [facility] CARDIAC REHAB     [facility]  Primary Diagnosis:   1. Heart failure, unspecified HF chronicity, unspecified heart failure type  Follow Up In Cardiac Rehab         Onset/Date of Diagnosis: ***    {UH CR/SD/VR Session Number:31146}    AACVPR Risk Stratification:       Falls Risk: {UH CR/SD/VR FALLS RISK:14099}  Psychosocial Assessment     No data recorded    Sent PH-Q 9 to MD if score > 20: {UH CR/SD/VR PHQ-9 SENT TO MD:48573}    Pt reported/currently experiencing stress: {UH CR/SD/VR Reported Stress:80434}  Patient uses stress management skills: {YES/NO:200010}  History of: {Hx Anxiety/Depression:20928}  Currently seeing a mental health provider: {Yes/No:89121}  Social Support: {Yes/No:29197}  Quality of Life Survey: {UH CR/SD/VR QOLS:51205}  Learning Assessment:  Learning assessment/barriers: {Assessment/barriers:78689}  Preferred learning method: {Preferred learning method:98823}  Barriers: {Barriers to Education:75447}  Comments:    Stages of Change:{Stages of change:23703}    Psychosocial Plan    Goal Status: {UH CR/SD/VR Goal Status:90103}    Psychosocial Goals: {UH CR/SD/VR PSYCHOSOCIAL GOALS:35564}    Psychosocial Interventions/Education: {UH CR/SD/VR To be done in Cardiac Rehab:17618}    {UH AMB CR/SD/VR Psychosocial Initial Reassess Discharge:74668}    Nutrition Assessment:    Hyperlipidemia: {YES/NO:200010}    Lipids:   Lab Results   Component Value Date    CHOL 130 05/06/2024    HDL 56.9 05/06/2024    LDLF 46 05/04/2023    TRIG 67 05/06/2024       Current Dietary Guidelines: {Dietary Guidelines:15079:x}  Barriers to dietary change: {response; yes (wildcard)/no:458651}    Diet Habit Survey: Picture Your Plate  Pre: {UH  "CR/CO/VR PYP Initial:95166}  Post: { CR/CO/VR PYP Post:98317}    Diabetes Assessment    No results found for: \"HGBA1C\"    History of Diabetes: { CR/CO/VR DM ASSESSMENT:44284}    Weight Management       No data recorded    Nutrition Plan    Goal Status: { CR/CO/VR Goal Status:56852}    Nutrition Goals: { CR/CO/VR Nutrition Goals:44351}    Nutrition Interventions/Education:   { CR/CO/VR To be done in Cardiac Rehab:92001}    { AMB CR/CO/VR Nutrition Initial Reassess Discharge:57238}    Exercise Assessment    { CR/CO/VR Current Home Exercise:03342}    Exercise Prescription     Exercise Prescription based on: { CR/CO/VR ExRx Evaluation:24838}   Frequency:  { CR/CO/VR Session Frequency:62211} days/week   Mode: {Mode:08247}   Duration: *** total aerobic minutes   Intensity: RPE ***  Target HR:  { CR/CO/VR Target HR:41319}  MET Level: ***  Patient wears supplemental O2: { CR/CO/VR Supplemental O2:60846}     Modality Workload METs Duration (minutes)   1 Pre-Exercise      2 { CR/CO/VR Exercise Modality:36620} ***  *** *** :00   3 { CR/CO/VR Exercise Modality:27927} ***  *** *** :00   4 { CR/CO/VR Exercise Modality:23370} ***  *** *** :00   5 { CR/CO/VR Exercise Modality:38553} ***  *** *** :00   6 Post-Exercise        Resistance Training: {YES/NO:770051}  Home Exercise Prescription given: { CR/CO/VR Home Ex Rx:34207}    Exercise Plan    Goal Status: { CR/CO/VR Goal Status:95938}    Exercise Goals: { CR/CO/VR Exercise Goals:75675}    Exercise Interventions/Education:   { CR/CO/VR To be done in Cardiac Rehab:92716}    { AMB CR/CO/VR Exercise Initial Reassess Discharge:23845}    Other Core Components/Risk Factor Assessment:    Medication adherence  Current Medications:   Medication Documentation Review Audit       Reviewed by Aman Scruggs MA (Medical Assistant) on 05/15/25 at 0944      Medication Order Taking? Sig Documenting Provider Last Dose Status   apixaban (Eliquis) 2.5 mg tablet " [id number] Yes Take 1 tablet (2.5 mg) by mouth 2 times a day. JAMESON Hamilton  Active   Farxiga 10 mg tablet [id number] Yes TAKE 1 TABLET DAILY Adan Cardozo MD  Active   furosemide (Lasix) 20 mg tablet [id number] Yes Take 1 tablet (20 mg) by mouth 2 times daily (morning and late afternoon). Lino Valdez MD  Active   lisinopril 5 mg tablet [id number] Yes Take 1 tablet (5 mg) by mouth once daily.   Patient taking differently: Take 2 tablets (10 mg) by mouth once daily.    JAMESON Hamilton  Active   LORazepam (Ativan) 0.5 mg tablet [id number] Yes Take 1 tablet (0.5 mg) by mouth once daily as needed for anxiety. Gerardo Marie,   Active   metoprolol succinate XL (Toprol-XL) 25 mg 24 hr tablet [id number] Yes Take 1 tablet (25 mg) by mouth once daily. Do not crush or chew. JAMESON Hamilton  Active   predniSONE (Deltasone) 10 mg tablet [id number] Yes Take 1 tablet 3 times daily for 5 days then 1 tablet twice daily for 5 days then 1 tablet daily till finished Gerardo Marie, DO  Active   rosuvastatin (Crestor) 5 mg tablet [id number] Yes TAKE 1 TABLET DAILY Adan Cardozo MD  Active                                 Medication compliance: {UH GEN YES NO WILDCARD WILDCARD:09195}   Uses pill box/organizer: {YES/NO:200010}   Carries medication list: {YES/NO:200010}    Blood Pressure Management  History of Hypertension: {YES/NO:200010}  Medication Changes: {YES/NO:200010}  Resting BP:  There were no vitals taken for this visit.     Heart Failure Management  Hx of Heart Failure: { CR/NJ/VR HF Management:65545}    Smoking/Tobacco Assessment  Tobacco Use History[1]    Other Core Component Plan    Goal Status: { CR/NJ/VR Goal Status:19610}    Other Core Component Goals: { CR/NJ/VR Other Goals:79200}    Other Core Component Interventions/Education:   { CR/NJ/VR Other Goals:03842}    { AMB CR/NJ/VR Other Core Initial Reassess Discharge:47404}    Individual Patient  Goals:    ***  ***    Goal Status: {UH CR/WA/VR Goal Status:75252}    Staff Comments:  ***    Rehab Staff Signature: TORITO HARTLEY             [1]   Social History  Tobacco Use   Smoking Status Never   Smokeless Tobacco Never

## 2025-06-04 ENCOUNTER — CLINICAL SUPPORT (OUTPATIENT)
Dept: CARDIAC REHAB | Facility: HOSPITAL | Age: OVER 89
End: 2025-06-04
Payer: MEDICARE

## 2025-06-04 DIAGNOSIS — I50.9 HEART FAILURE, UNSPECIFIED HF CHRONICITY, UNSPECIFIED HEART FAILURE TYPE: ICD-10-CM

## 2025-06-04 PROCEDURE — 93798 PHYS/QHP OP CAR RHAB W/ECG: CPT | Performed by: INTERNAL MEDICINE

## 2025-06-09 ENCOUNTER — APPOINTMENT (OUTPATIENT)
Dept: PRIMARY CARE | Facility: CLINIC | Age: OVER 89
End: 2025-06-09
Payer: MEDICARE

## 2025-06-09 ENCOUNTER — HOSPITAL ENCOUNTER (OUTPATIENT)
Dept: RADIOLOGY | Facility: CLINIC | Age: OVER 89
Discharge: HOME | End: 2025-06-09
Payer: MEDICARE

## 2025-06-09 ENCOUNTER — CLINICAL SUPPORT (OUTPATIENT)
Dept: CARDIAC REHAB | Facility: HOSPITAL | Age: OVER 89
End: 2025-06-09
Payer: MEDICARE

## 2025-06-09 VITALS
DIASTOLIC BLOOD PRESSURE: 86 MMHG | WEIGHT: 125.8 LBS | HEIGHT: 63 IN | BODY MASS INDEX: 22.29 KG/M2 | SYSTOLIC BLOOD PRESSURE: 122 MMHG | OXYGEN SATURATION: 92 % | TEMPERATURE: 97.3 F | HEART RATE: 69 BPM

## 2025-06-09 DIAGNOSIS — E78.2 HYPERLIPIDEMIA, MIXED: ICD-10-CM

## 2025-06-09 DIAGNOSIS — N18.31 STAGE 3A CHRONIC KIDNEY DISEASE (MULTI): ICD-10-CM

## 2025-06-09 DIAGNOSIS — S20.212D CONTUSION OF RIB ON LEFT SIDE, SUBSEQUENT ENCOUNTER: ICD-10-CM

## 2025-06-09 DIAGNOSIS — I10 ESSENTIAL HYPERTENSION: ICD-10-CM

## 2025-06-09 DIAGNOSIS — C61 CARCINOMA OF PROSTATE: ICD-10-CM

## 2025-06-09 DIAGNOSIS — Z95.2 S/P TAVR (TRANSCATHETER AORTIC VALVE REPLACEMENT): ICD-10-CM

## 2025-06-09 DIAGNOSIS — Z00.00 MEDICARE ANNUAL WELLNESS VISIT, SUBSEQUENT: Primary | ICD-10-CM

## 2025-06-09 DIAGNOSIS — F41.8 SITUATIONAL ANXIETY: ICD-10-CM

## 2025-06-09 DIAGNOSIS — M15.9 GENERALIZED OSTEOARTHRITIS OF MULTIPLE SITES: ICD-10-CM

## 2025-06-09 DIAGNOSIS — I50.9 HEART FAILURE, UNSPECIFIED HF CHRONICITY, UNSPECIFIED HEART FAILURE TYPE: ICD-10-CM

## 2025-06-09 DIAGNOSIS — I48.3 TYPICAL ATRIAL FLUTTER (MULTI): ICD-10-CM

## 2025-06-09 PROCEDURE — G0439 PPPS, SUBSEQ VISIT: HCPCS | Performed by: FAMILY MEDICINE

## 2025-06-09 PROCEDURE — 71101 X-RAY EXAM UNILAT RIBS/CHEST: CPT | Mod: LEFT SIDE | Performed by: RADIOLOGY

## 2025-06-09 PROCEDURE — 1160F RVW MEDS BY RX/DR IN RCRD: CPT | Performed by: FAMILY MEDICINE

## 2025-06-09 PROCEDURE — 1170F FXNL STATUS ASSESSED: CPT | Performed by: FAMILY MEDICINE

## 2025-06-09 PROCEDURE — 3074F SYST BP LT 130 MM HG: CPT | Performed by: FAMILY MEDICINE

## 2025-06-09 PROCEDURE — 99497 ADVNCD CARE PLAN 30 MIN: CPT | Performed by: FAMILY MEDICINE

## 2025-06-09 PROCEDURE — 71101 X-RAY EXAM UNILAT RIBS/CHEST: CPT | Mod: LT

## 2025-06-09 PROCEDURE — 1159F MED LIST DOCD IN RCRD: CPT | Performed by: FAMILY MEDICINE

## 2025-06-09 PROCEDURE — 1036F TOBACCO NON-USER: CPT | Performed by: FAMILY MEDICINE

## 2025-06-09 PROCEDURE — 99397 PER PM REEVAL EST PAT 65+ YR: CPT | Performed by: FAMILY MEDICINE

## 2025-06-09 PROCEDURE — 1126F AMNT PAIN NOTED NONE PRSNT: CPT | Performed by: FAMILY MEDICINE

## 2025-06-09 PROCEDURE — 93798 PHYS/QHP OP CAR RHAB W/ECG: CPT | Performed by: INTERNAL MEDICINE

## 2025-06-09 PROCEDURE — 3079F DIAST BP 80-89 MM HG: CPT | Performed by: FAMILY MEDICINE

## 2025-06-09 RX ORDER — LORAZEPAM 0.5 MG/1
0.5 TABLET ORAL DAILY PRN
Qty: 90 TABLET | Refills: 0 | Status: SHIPPED | OUTPATIENT
Start: 2025-06-09

## 2025-06-09 ASSESSMENT — ACTIVITIES OF DAILY LIVING (ADL)
GROCERY SHOPPING: INDEPENDENT
WALKS IN HOME: INDEPENDENT
JUDGMENT_ADEQUATE_SAFELY_COMPLETE_DAILY_ACTIVITIES: YES
USING TELEPHONE: INDEPENDENT
EATING: INDEPENDENT
DOING HOUSEWORK: INDEPENDENT
FEEDING YOURSELF: INDEPENDENT
MANAGING FINANCES: INDEPENDENT
BATHING: INDEPENDENT
GROOMING: INDEPENDENT
USING TRANSPORTATION: INDEPENDENT
PREPARING MEALS: INDEPENDENT
STIL DRIVING: YES
NEEDS ASSISTANCE WITH FOOD: INDEPENDENT
DRESSING YOURSELF: INDEPENDENT
TAKING MEDICATION: INDEPENDENT
TOILETING: INDEPENDENT
PATIENT'S MEMORY ADEQUATE TO SAFELY COMPLETE DAILY ACTIVITIES?: YES
ADEQUATE_TO_COMPLETE_ADL: YES

## 2025-06-09 ASSESSMENT — ANXIETY QUESTIONNAIRES
4. TROUBLE RELAXING: NOT AT ALL
3. WORRYING TOO MUCH ABOUT DIFFERENT THINGS: NOT AT ALL
5. BEING SO RESTLESS THAT IT IS HARD TO SIT STILL: NOT AT ALL
7. FEELING AFRAID AS IF SOMETHING AWFUL MIGHT HAPPEN: NOT AT ALL
GAD7 TOTAL SCORE: 0
1. FEELING NERVOUS, ANXIOUS, OR ON EDGE: NOT AT ALL
6. BECOMING EASILY ANNOYED OR IRRITABLE: NOT AT ALL
2. NOT BEING ABLE TO STOP OR CONTROL WORRYING: NOT AT ALL

## 2025-06-09 ASSESSMENT — ENCOUNTER SYMPTOMS
RESPIRATORY NEGATIVE: 1
ARTHRALGIAS: 1
ENDOCRINE NEGATIVE: 1
SLEEP DISTURBANCE: 1
OCCASIONAL FEELINGS OF UNSTEADINESS: 0
CARDIOVASCULAR NEGATIVE: 1
LOSS OF SENSATION IN FEET: 0
DEPRESSION: 0
GASTROINTESTINAL NEGATIVE: 1
NEUROLOGICAL NEGATIVE: 1
FATIGUE: 1

## 2025-06-09 ASSESSMENT — GERIATRIC MINI NUTRITIONAL ASSESSMENT (MNA)
B WEIGHT LOSS DURING THE LAST 3 MONTHS: NO WEIGHT LOSS
A HAS FOOD INTAKE DECLINED OVER THE PAST 3 MONTHS DUE TO LOSS OF APPETITE, DIGESTIVE PROBLEMS, CHEWING OR SWALLOWING DIFFICULTIES?: NO DECREASE IN FOOD INTAKE
C GENERAL MOBILITY: GOES OUT
E NEUROPSYCHOLOGICAL PROBLEMS: NO PSYCHOLOGICAL PROBLEMS
D HAS SUFFERED PSYCHOLOGICAL STRESS OR ACUTE DISEASE IN THE PAST 3 MONTHS?: NO

## 2025-06-09 ASSESSMENT — PAIN SCALES - GENERAL: PAINLEVEL_OUTOF10: 0-NO PAIN

## 2025-06-09 NOTE — PROGRESS NOTES
"Subjective   Patient ID: rSavan Christiansen is a 89 y.o. male who presents for Annual Exam (Annual medicare wellness ).    HPI     Review of Systems   Constitutional:  Positive for fatigue.   HENT: Negative.     Respiratory: Negative.     Cardiovascular: Negative.         Dr Cardozo   Gastrointestinal: Negative.    Endocrine: Negative.    Genitourinary: Negative.    Musculoskeletal:  Positive for arthralgias.   Neurological: Negative.    Psychiatric/Behavioral:  Positive for sleep disturbance.        Objective   /86 (BP Location: Right arm)   Pulse 69   Temp 36.3 °C (97.3 °F) (Oral)   Ht 1.6 m (5' 3\")   Wt 57.1 kg (125 lb 12.8 oz)   SpO2 92%   BMI 22.28 kg/m²     Physical Exam  Vitals and nursing note reviewed.   Constitutional:       Appearance: Normal appearance.   HENT:      Right Ear: Tympanic membrane normal.      Left Ear: Tympanic membrane normal.   Cardiovascular:      Rate and Rhythm: Normal rate and regular rhythm.      Pulses: Normal pulses.      Heart sounds: Normal heart sounds.   Pulmonary:      Breath sounds: Normal breath sounds.   Abdominal:      Palpations: Abdomen is soft.   Musculoskeletal:      Comments: Tenderness lower left rib cage   Neurological:      General: No focal deficit present.      Mental Status: He is alert and oriented to person, place, and time.   Psychiatric:         Mood and Affect: Mood normal.         Behavior: Behavior normal.         Assessment/Plan patient seen here for an annual Medicare wellness exam.  We reviewed his questionnaire he is agreeable to his responses.  We did discuss advanced directives.  We also reviewed his recent visit to the emergency room on May 26 secondary to mechanical fall.  X-rays were reviewed he is feeling somewhat better however he continues to be fatigued and does have some tenderness in his left rib cage we are getting an x-ray of his left ribs.  He does not need he had any further lab work drawn.  We refilled his lorazepam which she " only uses to help with sleep.  Will see him back in 3 months.  Problem List Items Addressed This Visit           ICD-10-CM    Essential hypertension I10    Generalized osteoarthritis of multiple sites M15.9    Hyperlipidemia, mixed E78.2    Situational anxiety F41.8    Relevant Medications    LORazepam (Ativan) 0.5 mg tablet    Carcinoma of prostate C61    S/P TAVR (transcatheter aortic valve replacement) Z95.2    Stage 3a chronic kidney disease (Multi) N18.31    Typical atrial flutter (Multi) I48.3     Other Visit Diagnoses         Codes      Medicare annual wellness visit, subsequent    -  Primary Z00.00      BMI 22.0-22.9, adult     Z68.22      Contusion of rib on left side, subsequent encounter     S20.212D    Relevant Orders    XR ribs 2 views left w chest pa or ap

## 2025-06-09 NOTE — ACP (ADVANCE CARE PLANNING)
Confirming Previous Code Status:   Advance Care Planning Note     Discussion Date: 06/09/25   Discussion Participants: patient    The patient wishes to discuss Advance Care Planning today and the following is a brief summary of our discussion.     Patient has capacity to make their own medical decisions: Yes  Health Care Agent/Surrogate Decision Maker documented in chart: Yes    Documents on file and valid:  Advance Directive/Living Will: Yes   Health Care Power of : Yes  Other: none    Communication of Medical Status/Prognosis:   yes     Communication of Treatment Goals/Options:   yes     Treatment Decisions  Goals of Care: survival is paramount regardless of prognosis, treatment outcome, or burden   yes  Follow Up Plan  no  Team Members  myself  Time Statement: Total face to face time spent on advance care planning was 16 minutes with 16 minutes spent in counseling, including the explanation.    Gerardo Marie DO  6/9/2025 12:03 PM

## 2025-06-11 ENCOUNTER — CLINICAL SUPPORT (OUTPATIENT)
Dept: CARDIAC REHAB | Facility: HOSPITAL | Age: OVER 89
End: 2025-06-11
Payer: MEDICARE

## 2025-06-11 DIAGNOSIS — I50.9 HEART FAILURE, UNSPECIFIED HF CHRONICITY, UNSPECIFIED HEART FAILURE TYPE: ICD-10-CM

## 2025-06-11 PROCEDURE — 93798 PHYS/QHP OP CAR RHAB W/ECG: CPT | Performed by: INTERNAL MEDICINE

## 2025-06-12 ENCOUNTER — TELEPHONE (OUTPATIENT)
Dept: CARDIOLOGY | Facility: CLINIC | Age: OVER 89
End: 2025-06-12
Payer: MEDICARE

## 2025-06-12 DIAGNOSIS — R06.00 DYSPNEA, UNSPECIFIED TYPE: ICD-10-CM

## 2025-06-12 DIAGNOSIS — I50.22 CHRONIC SYSTOLIC (CONGESTIVE) HEART FAILURE: Primary | ICD-10-CM

## 2025-06-12 NOTE — TELEPHONE ENCOUNTER
Pt called to report increased SOB w/minimal exertion. Pt states he becomes SOB just walking from the bedroom to bathroom.    Pt denies edema, weight up 1 1/2 lbs over the last week.     Taking Furosemide 20mg twice a day.    CMP on 5/26/25: B/C 35, 1.69; K+4.5    Pt seeing Dr. Valdez on 6/26/25.    Next ov  with Dr. Cardozo 10/29/25.    Please advise. Thanks.

## 2025-06-13 NOTE — TELEPHONE ENCOUNTER
Per Dr. Cardozo, called and notified pt regarding medication change. Pt repeated instructions correctly. Will pend rx.

## 2025-06-15 RX ORDER — TORSEMIDE 20 MG/1
20 TABLET ORAL DAILY
Qty: 30 TABLET | Refills: 11 | Status: SHIPPED | OUTPATIENT
Start: 2025-06-15 | End: 2025-06-16 | Stop reason: SDUPTHER

## 2025-06-16 ENCOUNTER — OFFICE VISIT (OUTPATIENT)
Dept: OTOLARYNGOLOGY | Facility: CLINIC | Age: OVER 89
End: 2025-06-16
Payer: MEDICARE

## 2025-06-16 ENCOUNTER — APPOINTMENT (OUTPATIENT)
Dept: CARDIAC REHAB | Facility: HOSPITAL | Age: OVER 89
End: 2025-06-16
Payer: MEDICARE

## 2025-06-16 VITALS
SYSTOLIC BLOOD PRESSURE: 108 MMHG | HEIGHT: 63 IN | TEMPERATURE: 98.3 F | DIASTOLIC BLOOD PRESSURE: 68 MMHG | HEART RATE: 66 BPM | WEIGHT: 125 LBS | BODY MASS INDEX: 22.15 KG/M2

## 2025-06-16 DIAGNOSIS — R06.00 DYSPNEA, UNSPECIFIED TYPE: Primary | ICD-10-CM

## 2025-06-16 DIAGNOSIS — H61.23 BILATERAL IMPACTED CERUMEN: ICD-10-CM

## 2025-06-16 DIAGNOSIS — I50.22 CHRONIC SYSTOLIC (CONGESTIVE) HEART FAILURE: ICD-10-CM

## 2025-06-16 DIAGNOSIS — I50.9 HEART FAILURE, UNSPECIFIED HF CHRONICITY, UNSPECIFIED HEART FAILURE TYPE: ICD-10-CM

## 2025-06-16 DIAGNOSIS — H91.93 BILATERAL HEARING LOSS, UNSPECIFIED HEARING LOSS TYPE: Primary | ICD-10-CM

## 2025-06-16 PROCEDURE — 99212 OFFICE O/P EST SF 10 MIN: CPT | Mod: 25 | Performed by: NURSE PRACTITIONER

## 2025-06-16 PROCEDURE — 1126F AMNT PAIN NOTED NONE PRSNT: CPT | Performed by: NURSE PRACTITIONER

## 2025-06-16 PROCEDURE — 3074F SYST BP LT 130 MM HG: CPT | Performed by: NURSE PRACTITIONER

## 2025-06-16 PROCEDURE — 1159F MED LIST DOCD IN RCRD: CPT | Performed by: NURSE PRACTITIONER

## 2025-06-16 PROCEDURE — 1036F TOBACCO NON-USER: CPT | Performed by: NURSE PRACTITIONER

## 2025-06-16 PROCEDURE — 69210 REMOVE IMPACTED EAR WAX UNI: CPT | Mod: 50 | Performed by: NURSE PRACTITIONER

## 2025-06-16 PROCEDURE — 93798 PHYS/QHP OP CAR RHAB W/ECG: CPT | Performed by: INTERNAL MEDICINE

## 2025-06-16 PROCEDURE — 3078F DIAST BP <80 MM HG: CPT | Performed by: NURSE PRACTITIONER

## 2025-06-16 PROCEDURE — 69210 REMOVE IMPACTED EAR WAX UNI: CPT | Performed by: NURSE PRACTITIONER

## 2025-06-16 PROCEDURE — 99212 OFFICE O/P EST SF 10 MIN: CPT | Performed by: NURSE PRACTITIONER

## 2025-06-16 RX ORDER — TORSEMIDE 20 MG/1
20 TABLET ORAL DAILY
Qty: 90 TABLET | Refills: 3 | Status: ON HOLD | OUTPATIENT
Start: 2025-06-16 | End: 2026-06-16

## 2025-06-16 SDOH — ECONOMIC STABILITY: FOOD INSECURITY: WITHIN THE PAST 12 MONTHS, YOU WORRIED THAT YOUR FOOD WOULD RUN OUT BEFORE YOU GOT MONEY TO BUY MORE.: NEVER TRUE

## 2025-06-16 SDOH — ECONOMIC STABILITY: FOOD INSECURITY: WITHIN THE PAST 12 MONTHS, THE FOOD YOU BOUGHT JUST DIDN'T LAST AND YOU DIDN'T HAVE MONEY TO GET MORE.: NEVER TRUE

## 2025-06-16 ASSESSMENT — ENCOUNTER SYMPTOMS
OCCASIONAL FEELINGS OF UNSTEADINESS: 0
LOSS OF SENSATION IN FEET: 0
DEPRESSION: 0

## 2025-06-16 ASSESSMENT — LIFESTYLE VARIABLES
HOW OFTEN DO YOU HAVE SIX OR MORE DRINKS ON ONE OCCASION: NEVER
HOW OFTEN DO YOU HAVE A DRINK CONTAINING ALCOHOL: MONTHLY OR LESS
SKIP TO QUESTIONS 9-10: 1
AUDIT-C TOTAL SCORE: 1
HOW MANY STANDARD DRINKS CONTAINING ALCOHOL DO YOU HAVE ON A TYPICAL DAY: 1 OR 2

## 2025-06-16 ASSESSMENT — COLUMBIA-SUICIDE SEVERITY RATING SCALE - C-SSRS
6. HAVE YOU EVER DONE ANYTHING, STARTED TO DO ANYTHING, OR PREPARED TO DO ANYTHING TO END YOUR LIFE?: NO
1. IN THE PAST MONTH, HAVE YOU WISHED YOU WERE DEAD OR WISHED YOU COULD GO TO SLEEP AND NOT WAKE UP?: NO
2. HAVE YOU ACTUALLY HAD ANY THOUGHTS OF KILLING YOURSELF?: NO

## 2025-06-16 ASSESSMENT — PAIN SCALES - GENERAL: PAINLEVEL_OUTOF10: 0-NO PAIN

## 2025-06-16 NOTE — PROGRESS NOTES
Subjective   Patient ID: Sravan Christiansen is a 89 y.o. male who presents for ear cleaning.    HPI  Patient here for ear cleaning.  Last visit was 4/22/2024. He feels his ears are feeling good. He wears bilateral hearing aids. Denies any ear pain.     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    Dyshidrosis    Essential hypertension    Femoral hernia    Gait instability    Generalized osteoarthritis of multiple sites    Herpes simplex    Hordeolum externum of right lower eyelid    Hyperhidrosis    Hyperlipidemia, mixed    Muscle strain of left lower leg    Presence of stent in coronary artery    Sebaceous cyst    Shoulder pain    Situational anxiety    Tinnitus    TMJ arthritis    Weakness of both lower extremities    Chronic systolic (congestive) heart failure    Hearing loss, bilateral    Multinodular thyroid    Multiple contusions    Palpitations    Scalp laceration, sequela    Thyromegaly    Hematoma of IV site    Hypoxia    Multifocal pneumonia    Carcinoma of prostate    Fracture, Colles, left, closed    Pulmonary nodule    S/P TAVR (transcatheter aortic valve replacement)    Benign lipomatous neoplasm    Rotator cuff arthropathy of both shoulders    Thoracic aortic aneurysm, without rupture, unspecified    Chronic fatigue    Cachexia (Multi)    Stage 3a chronic kidney disease (Multi)    Dyspnea    Pain in right hand    Typical atrial flutter (Multi)     Past Surgical History:   Procedure Laterality Date    ANKLE ARTHROSCOPY W/ OPEN REPAIR  02/18/2016    Ankle Repair    CATARACT EXTRACTION  06/07/2018    Cataract Surgery    COLONOSCOPY  04/13/2018    Colonoscopy    CORONARY ANGIOPLASTY WITH STENT PLACEMENT  02/18/2016    Cath Stent Placement    HERNIA REPAIR  02/18/2016    Hernia Repair    PROSTATE SURGERY  04/13/2018    Prostate Surgery     Review of Systems    All other systems have been reviewed and are negative for complaints except for those mentioned  in history of present illness, past medical history and problem list.    Objective   Physical Exam    Right Ear: External inspection of ear with no deformity, scars, or masses. EAC is impacted with cerumen, TM not visible.    Left Ear: External inspection of ear with no deformity, scars, or masses. EAC is impacted with cerumen, TM not visible.    Procedure: Cerumen Removal  Indication: Cerumen Impaction  Risks, benefits, alternatives, and expectations discussed with patient and patient wishes to proceed.    Bilateral canals with cerumen impaction.  Using the microscope, suction, and alligator forceps, large amounts of soft brown cerumen removed bilaterally. Both TMs intact. No effusions or retractions noted.  Patient tolerated procedure well.     Assessment/Plan   Diagnoses and all orders for this visit:  Bilateral hearing loss, unspecified hearing loss type  Cerumen impaction, bilateral    Ears were successfully cleaned. Follow up in 1 year for repeat ear cleaning.  All questions answered to patient satisfaction.       DAMIAN Sheth-CNP 06/16/25 10:53 AM

## 2025-06-18 ENCOUNTER — APPOINTMENT (OUTPATIENT)
Dept: CARDIAC REHAB | Facility: HOSPITAL | Age: OVER 89
End: 2025-06-18
Payer: MEDICARE

## 2025-06-18 DIAGNOSIS — I50.9 HEART FAILURE, UNSPECIFIED HF CHRONICITY, UNSPECIFIED HEART FAILURE TYPE: ICD-10-CM

## 2025-06-18 PROCEDURE — 93798 PHYS/QHP OP CAR RHAB W/ECG: CPT | Performed by: INTERNAL MEDICINE

## 2025-06-18 NOTE — PROGRESS NOTES
Cardiac Rehabilitation Individual Treatment Plan  - Discharge    Name: Sravan Christiansen  Medical Record Number: [id number]  YOB: 1930  Age: 89 y.o.    Today’s Date: 4/9/2025,  (Original interview completed 4/3/25)  Primary Care Physician: Gerardo Marie DO  Referring Physician: Lino Valdez MD, Dr. Chris MD (as Medical Director)  Program Location: [facility] CARDIAC REHAB     General  Primary Diagnosis:  CHF    1. Heart failure, unspecified HF chronicity, unspecified heart failure type  Follow Up In Cardiac Rehab            CR/GA/VR 18 Sessions attended.      Pt requested to complete 18 sessions due to $40 CoPay/session, scheduled to begin program 4/9/2025    AACVPR Risk Stratification:   High    Falls Risk: Medium  Patient instructed to attach safety clip to waist band on the first time he walked on the TM and at each session afterwards.    Psychosocial Assessment   Pre PH-Q 9 survey score: 0  Post PH-Q 9 survey score: To be done at discharge.    Sent PH-Q 9 to MD if score > 20: Survey not sent    Pt reported/currently experiencing stress: Yes (states he uses Ativan daily at bedtime as needed to help relax for sleep)  Patient uses stress management skills: No   History of: anxiety  Currently seeing a mental health provider: No, Managed by PCP  Social Support: Yes, Whom:   Son & Grandchildren  Quality of Life Survey: SF-36   SF-36 Pre Post   Physical Functioning Score 35.99 23.36   Role Limits-Physical Score 32.36 20.12   General Health Score 47.21 28.15     Knowledge Assessment/Survey  Pre survey score 10/15  Post survey score:  10/15    Learning Assessment:  Learning assessment/barriers: [place], pain  Preferred learning method: Auditory, Visual, and Writing handout  Barriers: None, denies (Pt to complete 18 sessions due to $40 CoPay/session)    Comments: Readiness to Learn:  Ready and Willing to learn    Stages of Change:  Action    Psychosocial Plan    Goal Status: In Progress    Psychosocial  "Goals:   Maintain or improve PHQ-9 Survey score by completion of program.  Maintain or improve Post SF-36 Quality of Life Survey by completion of program.     Psychosocial Interventions/Education:   Pt attended education on Stress and Relaxation.     AMB CR/ID/VR Psychosocial  - Discharge  Post program PHQ9 reviewed and counseling resource list provided.  Pt's PHQ9 went up.  Pt reports due to having more difficulty getting his home responsibilities done with broken ribs.    Nutrition Assessment:    Hyperlipidemia: Yes     Lipids:   Lab Results   Component Value Date    CHOL 130 05/06/2024    HDL 56.9 05/06/2024    LDLF 46 05/04/2023    TRIG 67 05/06/2024       Current Dietary Guidelines: Pt current following regular diet  Barriers to dietary change: no, denies    Diet Habit Survey: [place] Dietary Assessment.  Pre survey score: 67%  Post survey score: 68.75    Diabetes Assessment    No results found for: \"HGBA1C\"    History of Diabetes: No    Weight Management    PRE Program: Wt: 115.6 pounds   Ht:  63 inches   BMI: 20.48  PRE Waist Cir:  35.5 Inches     POST  Wt: 123.2 pounds        BMI: 21.82    POST Waist Cir: 35 inches         Nutrition Plan    Goal Status: In Progress    Nutrition Goals:   Achieve a score of 80% or greater on Post [place] Dietary Assessment.  Attend at least 50% or more of Education classes provided by completion of program.      Nutrition Interventions/Education:   AHA handout \"What is Angina\"? And \"What is Cardiac Rehabilitation\"?  Lipid profile reviewed with patient during initial phone interview.  Pre program [place] Score Reviewed. Healthy Eating tip sheet provided, highlighting areas where scores were the lowest.   Pt attended education on \"Grillin and Chillin\" (Dietitian), Diabetes Managment     AMB CR/ID/VR Nutrition  -  Discharge  Post program [place] Score Reviewed. Healthy Eating tip sheet provided, highlighting areas where scores were the lowest.   Pt attended education on " Rethink your Drink (Dietitian)    Exercise Assessment    Current Home Exercise Prior to Program Start: None  Mode: Sedentary  Frequency: 0 days / week  Duration: 0 min / day    Exercise Prescription     Exercise Prescription based on: Duke Activity Status Index (DASI) and Health History    DASI Score: 18.95    MET Score: 5.0     Frequency:  2 days/week   Mode: Treadmill, NuStep /NuStep (T), Airdyne, Arm Ergometer, Recumbent Bike   Duration: 30-45 total aerobic minutes   Intensity: RPE 11-15  Target HR:  83-97  MET Level: 2.5 max starting  Patient wears supplemental O2: No  SpO2 Range: 100 to 92 %     Modality Workload METs Duration (minutes)   1   Pre-Exercise /Rest -- -- 5: 00   2   NuStep 4000 50 sanon, Load 4 2.7 15:00   3   NuStep T5  30 sanon, Load 4 2.7 15:00   4   Treadmill Walk 1.3 mph, 0.5% incline  2.1 15:00   5   Airdyne 0.6 load, 20-25 Rpm  2.5 15:00   6   Arm Ergometer 18 sanon, level 1 2.6 15: 00   7   Recumbent Bike 1 Load, 50 Rpm 1.3 15:00   8   Post-Exercise/ Rest -- -- 5: 00     Resistance Training: No   Home Exercise Prescription given: 06/10/2025    Exercise Plan    Goal Status: In Progress    Exercise Goals:   150 min/week of moderate intensity aerobic exercise  Exercise Frequency 5-7 days a week.    Exercise Progression:  (Pt to complete 18 sessions due to $40 CoPay/session)  Increased exercise duration by 1 minutes per modality for a total of 45 minutes.   Increased exercise intensity on AE/TM/NS/AD based on patient's HR/BP/RPE     Exercise Interventions/Education:   Home exercise Rx reviewed with patient and exercise log given. Target Heart Rate instructions given to patient.     AMB CR/MT/VR Exercise  - Discharge      Other Core Components/Risk Factor Assessment:    Medication adherence  Current Medications:   ELIQUIS 2.5 mg twice a day  Dapagliflozin propanediol (Farxiga) 10 mg Daily  Torsemide 20mg daily  metoprolol succinate ER (TOPROL XL) 25 mg 24 hr tablet Daily  LORazepam (ATIVAN)  "0.5 mg Take 0.5 mg by mouth once daily as needed.  rosuvastatin (CRESTOR) 5 mg tablet Take 5 mg by mouth once daily.  lisinopril (ZESTRIL, PRINIVIL) 10 mg Daily     Allergies: Penicillin: Rash/swelling                Medication compliance: Yes   Uses pill box/organizer: Yes    Carries medication list: Yes     Blood Pressure Management  History of Hypertension: Yes   Medication Changes: No   Resting BP: See session reports for all V.S. documented during sessions/classes attended.    Heart Failure Management  Hx of Heart Failure: Yes  Most recent EF: 20-25%      Last heart failure hospitalization: 1/2025  Number of HF admissions in last year: 1    Does patient obtain daily weight Yes       Heart Failure Plan    Goal Status: In Progress    Heart Failure Goals:   Pt able to verbalize when to contact MD with HF symptoms  Follow a low sodium diet  Obtain daily weights    Heart Failure Interventions/Education:   Provide patient handout daily tracker for weight, Controlling Heart Failure at home, and Heart Failure handout by American Heart Association \"What is Heart Failure?\" on 1st day.     AMB CR/LA/VR Heart Failure  -  Discharge      Smoking/Tobacco Assessment  Social History     Tobacco Use   Smoking Status Never   Smokeless Tobacco Never   Quit Date verified with patient:  1/1/1953  Other forms of tobacco:  None, Denies          Anyone in the home smoke: No, Denies      Other Core Component Plan    Goal Status: In Progress    Other Core Component Goals:   Medication compliance established during initial phone interview.  Pt carries updated medication list with them at all times  Resting BP <130/80    Other Core Component Interventions/Education:   Patient given [facility] educational handout \"How Do I Manage My Medications\"?  Resting BP readings taken pre and post exercise at each session.  Pt attended education on CAD Risk Factors.     AMB CR/LA/VR Other Core Component  -  Discharge      Individual Patient " Goals:    Establish exercise routine 3-5 days/week, 30-60 mins/day by completion of program.        Goal Status: In Progress  To increase strength and endurance with ADL's, walk perimeter of grocery store with less fatigue by completion of program.        Goal Status: In Progress  Meet with Dietitian for Out Patient Nutrition therapy by completion of program.        Goal Status: In Progress    Staff Comments:  Patient has completed 18/18 sessions of cardiac rehabilitation.  He planned to do only 18 sessions due to $40 copay per class.  Pt unable to walk on treadmill at the beginning of the program due to deconditioning.  He was strong enough to start walking on the treadmill at session 8 and he was gradually increasing his speed and time until he fell at home.  He was unable to tolerate the treadmill after this fall (reported on 5/28/25).  Overall he had limited increase to exercise due to the limited amount of classes attended, broken ribs from fall and patients' age.  Pt plans to be as active as he can and walk extra inside stores when his ribs heal.    Educational Classes:  CAD Risk Factors 04-09/2025, Grillin and Chillin (Dietitian) 04/16/2025, Stress and Relaxation 4/23/25, Diabetes Management 5/7/25, Cardiac Medication(Pharmacy)5/14/25, Smoking Cessation 5/21/25, Hypertension 5/28/25, Rethink Your Drink (Dietitian) 06/04/2025, Heart Attack 06/11/25, Anatomy of the Heart 06/18/2025.    Cardiac Related ER Visits: 0             Hospital Admits: 0      PHYSICIAN REVIEW AND RESPONSE: (See Physician electronic signature)

## 2025-06-22 ENCOUNTER — HOSPITAL ENCOUNTER (OUTPATIENT)
Facility: HOSPITAL | Age: OVER 89
Setting detail: OBSERVATION
DRG: 280 | End: 2025-06-22
Attending: EMERGENCY MEDICINE | Admitting: STUDENT IN AN ORGANIZED HEALTH CARE EDUCATION/TRAINING PROGRAM
Payer: MEDICARE

## 2025-06-22 ENCOUNTER — APPOINTMENT (OUTPATIENT)
Dept: RADIOLOGY | Facility: HOSPITAL | Age: OVER 89
DRG: 280 | End: 2025-06-22
Payer: MEDICARE

## 2025-06-22 ENCOUNTER — APPOINTMENT (OUTPATIENT)
Dept: CARDIOLOGY | Facility: HOSPITAL | Age: OVER 89
DRG: 280 | End: 2025-06-22
Payer: MEDICARE

## 2025-06-22 VITALS
BODY MASS INDEX: 21.97 KG/M2 | HEIGHT: 63 IN | TEMPERATURE: 97.7 F | SYSTOLIC BLOOD PRESSURE: 110 MMHG | RESPIRATION RATE: 16 BRPM | HEART RATE: 66 BPM | OXYGEN SATURATION: 95 % | DIASTOLIC BLOOD PRESSURE: 75 MMHG | WEIGHT: 124 LBS

## 2025-06-22 DIAGNOSIS — I50.43 ACUTE ON CHRONIC COMBINED SYSTOLIC (CONGESTIVE) AND DIASTOLIC (CONGESTIVE) HEART FAILURE: ICD-10-CM

## 2025-06-22 DIAGNOSIS — I50.22 CHRONIC SYSTOLIC (CONGESTIVE) HEART FAILURE: ICD-10-CM

## 2025-06-22 DIAGNOSIS — R79.89 ELEVATED TROPONIN: Primary | ICD-10-CM

## 2025-06-22 DIAGNOSIS — I50.21 ACUTE SYSTOLIC (CONGESTIVE) HEART FAILURE: ICD-10-CM

## 2025-06-22 DIAGNOSIS — I10 ESSENTIAL HYPERTENSION: ICD-10-CM

## 2025-06-22 DIAGNOSIS — I50.23 ACUTE ON CHRONIC SYSTOLIC (CONGESTIVE) HEART FAILURE: ICD-10-CM

## 2025-06-22 LAB
ALBUMIN SERPL BCP-MCNC: 4.1 G/DL (ref 3.4–5)
ALP SERPL-CCNC: 66 U/L (ref 33–136)
ALT SERPL W P-5'-P-CCNC: 16 U/L (ref 10–52)
ANION GAP SERPL CALC-SCNC: 16 MMOL/L (ref 10–20)
APPEARANCE UR: CLEAR
AST SERPL W P-5'-P-CCNC: 18 U/L (ref 9–39)
BASOPHILS # BLD AUTO: 0.05 X10*3/UL (ref 0–0.1)
BASOPHILS NFR BLD AUTO: 0.9 %
BILIRUB SERPL-MCNC: 1.8 MG/DL (ref 0–1.2)
BILIRUB UR STRIP.AUTO-MCNC: NEGATIVE MG/DL
BNP SERPL-MCNC: 4584 PG/ML (ref 0–99)
BUN SERPL-MCNC: 49 MG/DL (ref 6–23)
CALCIUM SERPL-MCNC: 9 MG/DL (ref 8.6–10.3)
CARDIAC TROPONIN I PNL SERPL HS: 194 NG/L (ref 0–20)
CARDIAC TROPONIN I PNL SERPL HS: 201 NG/L (ref 0–20)
CHLORIDE SERPL-SCNC: 106 MMOL/L (ref 98–107)
CO2 SERPL-SCNC: 25 MMOL/L (ref 21–32)
COLOR UR: COLORLESS
CREAT SERPL-MCNC: 1.93 MG/DL (ref 0.5–1.3)
EGFRCR SERPLBLD CKD-EPI 2021: 31 ML/MIN/1.73M*2
EOSINOPHIL # BLD AUTO: 0.11 X10*3/UL (ref 0–0.4)
EOSINOPHIL NFR BLD AUTO: 1.9 %
ERYTHROCYTE [DISTWIDTH] IN BLOOD BY AUTOMATED COUNT: 14.5 % (ref 11.5–14.5)
GLUCOSE SERPL-MCNC: 105 MG/DL (ref 74–99)
GLUCOSE UR STRIP.AUTO-MCNC: ABNORMAL MG/DL
HCT VFR BLD AUTO: 42.4 % (ref 41–52)
HGB BLD-MCNC: 13.3 G/DL (ref 13.5–17.5)
IMM GRANULOCYTES # BLD AUTO: 0.01 X10*3/UL (ref 0–0.5)
IMM GRANULOCYTES NFR BLD AUTO: 0.2 % (ref 0–0.9)
INR PPP: 1.8 (ref 0.9–1.1)
KETONES UR STRIP.AUTO-MCNC: NEGATIVE MG/DL
LACTATE SERPL-SCNC: 1.1 MMOL/L (ref 0.4–2)
LEUKOCYTE ESTERASE UR QL STRIP.AUTO: NEGATIVE
LYMPHOCYTES # BLD AUTO: 1.42 X10*3/UL (ref 0.8–3)
LYMPHOCYTES NFR BLD AUTO: 24.7 %
MAGNESIUM SERPL-MCNC: 2.5 MG/DL (ref 1.6–2.4)
MCH RBC QN AUTO: 31.7 PG (ref 26–34)
MCHC RBC AUTO-ENTMCNC: 31.4 G/DL (ref 32–36)
MCV RBC AUTO: 101 FL (ref 80–100)
MONOCYTES # BLD AUTO: 0.63 X10*3/UL (ref 0.05–0.8)
MONOCYTES NFR BLD AUTO: 11 %
NEUTROPHILS # BLD AUTO: 3.53 X10*3/UL (ref 1.6–5.5)
NEUTROPHILS NFR BLD AUTO: 61.3 %
NITRITE UR QL STRIP.AUTO: NEGATIVE
NRBC BLD-RTO: 0 /100 WBCS (ref 0–0)
PH UR STRIP.AUTO: 5 [PH]
PLATELET # BLD AUTO: 129 X10*3/UL (ref 150–450)
POTASSIUM SERPL-SCNC: 4 MMOL/L (ref 3.5–5.3)
PROT SERPL-MCNC: 6.6 G/DL (ref 6.4–8.2)
PROT UR STRIP.AUTO-MCNC: NEGATIVE MG/DL
PROTHROMBIN TIME: 19.8 SECONDS (ref 9.8–12.4)
RBC # BLD AUTO: 4.2 X10*6/UL (ref 4.5–5.9)
RBC # UR STRIP.AUTO: NEGATIVE MG/DL
SODIUM SERPL-SCNC: 143 MMOL/L (ref 136–145)
SP GR UR STRIP.AUTO: 1.01
UROBILINOGEN UR STRIP.AUTO-MCNC: NORMAL MG/DL
WBC # BLD AUTO: 5.8 X10*3/UL (ref 4.4–11.3)

## 2025-06-22 PROCEDURE — G0378 HOSPITAL OBSERVATION PER HR: HCPCS

## 2025-06-22 PROCEDURE — 84484 ASSAY OF TROPONIN QUANT: CPT | Performed by: EMERGENCY MEDICINE

## 2025-06-22 PROCEDURE — 85025 COMPLETE CBC W/AUTO DIFF WBC: CPT | Performed by: EMERGENCY MEDICINE

## 2025-06-22 PROCEDURE — 96374 THER/PROPH/DIAG INJ IV PUSH: CPT

## 2025-06-22 PROCEDURE — 36415 COLL VENOUS BLD VENIPUNCTURE: CPT | Performed by: EMERGENCY MEDICINE

## 2025-06-22 PROCEDURE — 81003 URINALYSIS AUTO W/O SCOPE: CPT | Performed by: EMERGENCY MEDICINE

## 2025-06-22 PROCEDURE — 96361 HYDRATE IV INFUSION ADD-ON: CPT

## 2025-06-22 PROCEDURE — 71045 X-RAY EXAM CHEST 1 VIEW: CPT

## 2025-06-22 PROCEDURE — 93005 ELECTROCARDIOGRAM TRACING: CPT

## 2025-06-22 PROCEDURE — 99285 EMERGENCY DEPT VISIT HI MDM: CPT | Mod: 25 | Performed by: EMERGENCY MEDICINE

## 2025-06-22 PROCEDURE — 83605 ASSAY OF LACTIC ACID: CPT | Performed by: EMERGENCY MEDICINE

## 2025-06-22 PROCEDURE — 85610 PROTHROMBIN TIME: CPT | Performed by: EMERGENCY MEDICINE

## 2025-06-22 PROCEDURE — 80053 COMPREHEN METABOLIC PANEL: CPT | Performed by: EMERGENCY MEDICINE

## 2025-06-22 PROCEDURE — 83735 ASSAY OF MAGNESIUM: CPT | Performed by: EMERGENCY MEDICINE

## 2025-06-22 PROCEDURE — 83880 ASSAY OF NATRIURETIC PEPTIDE: CPT | Performed by: EMERGENCY MEDICINE

## 2025-06-22 PROCEDURE — 2500000004 HC RX 250 GENERAL PHARMACY W/ HCPCS (ALT 636 FOR OP/ED): Performed by: EMERGENCY MEDICINE

## 2025-06-22 PROCEDURE — 71045 X-RAY EXAM CHEST 1 VIEW: CPT | Performed by: RADIOLOGY

## 2025-06-22 PROCEDURE — 99223 1ST HOSP IP/OBS HIGH 75: CPT | Performed by: STUDENT IN AN ORGANIZED HEALTH CARE EDUCATION/TRAINING PROGRAM

## 2025-06-22 RX ORDER — LORAZEPAM 0.5 MG/1
0.5 TABLET ORAL DAILY PRN
Status: DISCONTINUED | OUTPATIENT
Start: 2025-06-22 | End: 2025-06-24 | Stop reason: HOSPADM

## 2025-06-22 RX ORDER — FUROSEMIDE 10 MG/ML
40 INJECTION INTRAMUSCULAR; INTRAVENOUS ONCE
Status: COMPLETED | OUTPATIENT
Start: 2025-06-22 | End: 2025-06-22

## 2025-06-22 RX ORDER — METOPROLOL SUCCINATE 25 MG/1
25 TABLET, EXTENDED RELEASE ORAL DAILY
Status: DISCONTINUED | OUTPATIENT
Start: 2025-06-23 | End: 2025-06-24 | Stop reason: HOSPADM

## 2025-06-22 RX ORDER — FUROSEMIDE 10 MG/ML
20 INJECTION INTRAMUSCULAR; INTRAVENOUS
Status: DISCONTINUED | OUTPATIENT
Start: 2025-06-23 | End: 2025-06-24 | Stop reason: HOSPADM

## 2025-06-22 RX ORDER — DAPAGLIFLOZIN 10 MG/1
10 TABLET, FILM COATED ORAL DAILY
Status: DISCONTINUED | OUTPATIENT
Start: 2025-06-23 | End: 2025-06-24 | Stop reason: HOSPADM

## 2025-06-22 RX ORDER — ROSUVASTATIN CALCIUM 10 MG/1
5 TABLET, COATED ORAL DAILY
Status: DISCONTINUED | OUTPATIENT
Start: 2025-06-23 | End: 2025-06-24 | Stop reason: HOSPADM

## 2025-06-22 RX ADMIN — FUROSEMIDE 40 MG: 10 INJECTION, SOLUTION INTRAMUSCULAR; INTRAVENOUS at 20:17

## 2025-06-22 RX ADMIN — SODIUM CHLORIDE, SODIUM LACTATE, POTASSIUM CHLORIDE, AND CALCIUM CHLORIDE 500 ML: .6; .31; .03; .02 INJECTION, SOLUTION INTRAVENOUS at 18:46

## 2025-06-22 SDOH — SOCIAL STABILITY: SOCIAL INSECURITY: ARE THERE ANY APPARENT SIGNS OF INJURIES/BEHAVIORS THAT COULD BE RELATED TO ABUSE/NEGLECT?: NO

## 2025-06-22 SDOH — SOCIAL STABILITY: SOCIAL INSECURITY: ABUSE: ADULT

## 2025-06-22 SDOH — SOCIAL STABILITY: SOCIAL INSECURITY: DO YOU FEEL UNSAFE GOING BACK TO THE PLACE WHERE YOU ARE LIVING?: NO

## 2025-06-22 SDOH — SOCIAL STABILITY: SOCIAL INSECURITY: HAVE YOU HAD ANY THOUGHTS OF HARMING ANYONE ELSE?: NO

## 2025-06-22 SDOH — SOCIAL STABILITY: SOCIAL INSECURITY: WERE YOU ABLE TO COMPLETE ALL THE BEHAVIORAL HEALTH SCREENINGS?: YES

## 2025-06-22 SDOH — SOCIAL STABILITY: SOCIAL INSECURITY: DOES ANYONE TRY TO KEEP YOU FROM HAVING/CONTACTING OTHER FRIENDS OR DOING THINGS OUTSIDE YOUR HOME?: NO

## 2025-06-22 SDOH — SOCIAL STABILITY: SOCIAL INSECURITY: ARE YOU OR HAVE YOU BEEN THREATENED OR ABUSED PHYSICALLY, EMOTIONALLY, OR SEXUALLY BY ANYONE?: NO

## 2025-06-22 SDOH — SOCIAL STABILITY: SOCIAL INSECURITY: HAS ANYONE EVER THREATENED TO HURT YOUR FAMILY OR YOUR PETS?: NO

## 2025-06-22 SDOH — SOCIAL STABILITY: SOCIAL INSECURITY: HAVE YOU HAD THOUGHTS OF HARMING ANYONE ELSE?: NO

## 2025-06-22 SDOH — SOCIAL STABILITY: SOCIAL INSECURITY: DO YOU FEEL ANYONE HAS EXPLOITED OR TAKEN ADVANTAGE OF YOU FINANCIALLY OR OF YOUR PERSONAL PROPERTY?: NO

## 2025-06-22 ASSESSMENT — COGNITIVE AND FUNCTIONAL STATUS - GENERAL
STANDING UP FROM CHAIR USING ARMS: A LITTLE
MOVING TO AND FROM BED TO CHAIR: A LITTLE
TOILETING: A LITTLE
PATIENT BASELINE BEDBOUND: NO
DRESSING REGULAR LOWER BODY CLOTHING: A LITTLE
HELP NEEDED FOR BATHING: A LITTLE
WALKING IN HOSPITAL ROOM: A LITTLE
MOBILITY SCORE: 17
MOVING FROM LYING ON BACK TO SITTING ON SIDE OF FLAT BED WITH BEDRAILS: A LITTLE
TURNING FROM BACK TO SIDE WHILE IN FLAT BAD: A LITTLE
DRESSING REGULAR UPPER BODY CLOTHING: A LITTLE
CLIMB 3 TO 5 STEPS WITH RAILING: A LOT
DAILY ACTIVITIY SCORE: 20

## 2025-06-22 ASSESSMENT — LIFESTYLE VARIABLES
HOW OFTEN DO YOU HAVE A DRINK CONTAINING ALCOHOL: NEVER
TOTAL SCORE: 0
EVER HAD A DRINK FIRST THING IN THE MORNING TO STEADY YOUR NERVES TO GET RID OF A HANGOVER: NO
EVER FELT BAD OR GUILTY ABOUT YOUR DRINKING: NO
HAVE PEOPLE ANNOYED YOU BY CRITICIZING YOUR DRINKING: NO
AUDIT-C TOTAL SCORE: 0
AUDIT-C TOTAL SCORE: 0
HAVE YOU EVER FELT YOU SHOULD CUT DOWN ON YOUR DRINKING: NO
SKIP TO QUESTIONS 9-10: 1
HOW OFTEN DO YOU HAVE 6 OR MORE DRINKS ON ONE OCCASION: NEVER
HOW MANY STANDARD DRINKS CONTAINING ALCOHOL DO YOU HAVE ON A TYPICAL DAY: PATIENT DOES NOT DRINK

## 2025-06-22 ASSESSMENT — PAIN SCALES - GENERAL
PAINLEVEL_OUTOF10: 0 - NO PAIN
PAINLEVEL_OUTOF10: 0 - NO PAIN

## 2025-06-22 ASSESSMENT — PAIN - FUNCTIONAL ASSESSMENT: PAIN_FUNCTIONAL_ASSESSMENT: 0-10

## 2025-06-22 ASSESSMENT — ACTIVITIES OF DAILY LIVING (ADL)
FEEDING YOURSELF: INDEPENDENT
WALKS IN HOME: NEEDS ASSISTANCE
TOILETING: NEEDS ASSISTANCE
JUDGMENT_ADEQUATE_SAFELY_COMPLETE_DAILY_ACTIVITIES: YES
GROOMING: INDEPENDENT
DRESSING YOURSELF: NEEDS ASSISTANCE
ADEQUATE_TO_COMPLETE_ADL: NO
BATHING: NEEDS ASSISTANCE
PATIENT'S MEMORY ADEQUATE TO SAFELY COMPLETE DAILY ACTIVITIES?: YES
LACK_OF_TRANSPORTATION: NO
HEARING - RIGHT EAR: HEARING AID
HEARING - LEFT EAR: HEARING AID

## 2025-06-22 NOTE — ED PROVIDER NOTES
"HPI   Chief Complaint   Patient presents with    Weakness, Gen       HPI: 89-year-old man presents via home with his family for \"decreased power\".  Family stated they thought he was dehydrated.  They have been increasing his diuretics.  He just finished cardiac rehab.  Patient is denying any chest pain or shortness of breath.  He denies any new swelling in his legs.  He does have a history of CHF.  He does have a EF of approximately 20% on his last echo.  He also has a history of valvular disease and had a TAVR.  Family HX: Denies any significant/pertinent family history.  Social Hx: Denies ETOH or drug use.  Review of systems:  Gen.: No weight loss, fatigue, anorexia, insomnia, fever.   Eyes: No vision loss, double vision, drainage, eye pain.   ENT: No pharyngitis, dry mouth.   Cardiac: No chest pain, palpitations, syncope, near syncope.   Pulmonary: No shortness of breath, cough, hemoptysis.   Heme/lymph: No swollen glands, fever, bleeding.   GI: No abdominal pain, change in bowel habits, melena, hematemesis, hematochezia, nausea, vomiting, diarrhea.   : No discharge, dysuria, frequency, urgency, hematuria.   Musculoskeletal: No limb pain, joint pain, joint swelling.   Skin: No rashes.   Psych: No depression, anxiety, suicidality, homicidality.   Review of systems is otherwise negative unless stated above or in history of present illness.    Physical Exam:    Appearance: Alert, oriented , cooperative,  in no acute distress. Well nourished & well hydrated.    Skin: Intact,  dry skin, no lesions, rash, petechiae or purpura.     Eyes: PERRLA, EOMs intact,  Conjunctiva pink with no redness or exudates. Eyelids without lesions. No scleral icterus.     ENT: Hearing grossly intact. External auditory canals patent, tympanic membranes intact with visible landmarks. Nares patent, mucus membranes moist. Dentition without lesions. Pharynx clear, uvula midline.     Neck: Supple, without meningismus. Thyroid not palpable. " Trachea at midline. No lymphadenopathy.    Pulmonary: Clear bilaterally with good chest wall excursion. No rales, rhonchi or wheezing. No accessory muscle use or stridor.    Cardiac: Normal S1, S2 without murmur, rub, gallop or extrasystole. No JVD, Carotids without bruits.    Abdomen: Soft, nontender, active bowel sounds.  No palpable organomegaly.  No rebound or guarding.  No CVA tenderness.    Genitourinary: Exam deferred.    Musculoskeletal: Full range of motion. no pain, edema, or deformity. Pulses full and equal. No cyanosis, clubbing, or edema.    Neurological:  Cranial nerves II through XII are grossly intact, finger-nose touch is normal, normal sensation, no weakness, no focal findings identified.  NIH stroke scale 0    Psychiatric: Appropriate mood and affect.     Medical Decision-Making:  Testing: EKG was obtained which is interpreted by me shows a flutter rate of 67 without evidence of obvious ST elevations to indicate acute ischemia or infarct.  Similar to an EKG dated January 3, 2025.  Repeat EKG again interpreted by me shows a atrial flutter rate of 66 no evidence of obvious ST elevations.  Initial troponin was elevated to 200.  Repeat is 194.  Given that the patient feels like he has generalized weakness I believe he would benefit from inpatient admission.  He did appear to be overloaded as he has an elevated BNP as well as a chest x-ray which showed some cephalization therefore we did also give him a dose of IV Lasix.  He is not currently having any chest pain.  He is already on anticoagulation.  He will be admitted.  Treatment:   Reevaluation:   Plan: AdmitPatient and family/friend/caregiver are in agreement with this plan.   Impression:   1.  Elevated troponin  2.  Labs Reviewed  CBC WITH AUTO DIFFERENTIAL - Abnormal     WBC                           5.8                    nRBC                          0.0                    RBC                           4.20 (*)               Hemoglobin                     13.3 (*)               Hematocrit                    42.4                   MCV                           101 (*)                MCH                           31.7                   MCHC                          31.4 (*)               RDW                           14.5                   Platelets                     129 (*)                Neutrophils %                 61.3                   Immature Granulocytes %, Automated   0.2                    Lymphocytes %                 24.7                   Monocytes %                   11.0                   Eosinophils %                 1.9                    Basophils %                   0.9                    Neutrophils Absolute          3.53                   Immature Granulocytes Absolute, Au*   0.01                   Lymphocytes Absolute          1.42                   Monocytes Absolute            0.63                   Eosinophils Absolute          0.11                   Basophils Absolute            0.05                COMPREHENSIVE METABOLIC PANEL - Abnormal     Glucose                       105 (*)                Sodium                        143                    Potassium                     4.0                    Chloride                      106                    Bicarbonate                   25                     Anion Gap                     16                     Urea Nitrogen                 49 (*)                 Creatinine                    1.93 (*)               eGFR                          31 (*)                 Calcium                       9.0                    Albumin                       4.1                    Alkaline Phosphatase          66                     Total Protein                 6.6                    AST                           18                     Bilirubin, Total              1.8 (*)                ALT                           16                  MAGNESIUM - Abnormal     Magnesium                     2.50  (*)            PROTIME-INR - Abnormal     Protime                       19.8 (*)               INR                           1.8 (*)             B-TYPE NATRIURETIC PEPTIDE - Abnormal     BNP                           4,584 (*)                 Narrative:    <100 pg/mL - Heart failure unlikely                100-299 pg/mL - Intermediate probability of acute heart                                failure exacerbation. Correlate with clinical                                context and patient history.                  >=300 pg/mL - Heart Failure likely. Correlate with clinical                                context and patient history.                                BNP testing is performed using different testing methodology at [facility] than at other [facility]. Direct result comparisons should only be made within the same method.                   SERIAL TROPONIN-INITIAL - Abnormal     Troponin I, High Sensitivity   201 (*)                  Narrative: Less than 99th percentile of normal range cutoff-                Female and children under 18 years old <14 ng/L; Male <21 ng/L: Negative                Repeat testing should be performed if clinically indicated.                                 Female and children under 18 years old 14-50 ng/L; Male 21-50 ng/L:                Consistent with possible cardiac damage and possible increased clinical                 risk. Serial measurements may help to assess extent of myocardial damage.                                 >50 ng/L: Consistent with cardiac damage, increased clinical risk and                myocardial infarction. Serial measurements may help assess extent of                 myocardial damage.                                  NOTE: Children less than 1 year old may have higher baseline troponin                 levels and results should be interpreted in conjunction with the overall                 clinical context.                                  NOTE: Troponin I testing is performed using a different                 testing methodology at [facility] than at other                 [facility] hospitals. Direct result comparisons should only                 be made within the same method.  URINALYSIS WITH REFLEX CULTURE AND MICROSCOPIC - Abnormal     Color, Urine                  Colorless (*)               Appearance, Urine             Clear                  Specific Gravity, Urine       1.007                  pH, Urine                     5.0                    Protein, Urine                NEGATIVE                Glucose, Urine                150 (2+) (*)               Blood, Urine                  NEGATIVE                Ketones, Urine                NEGATIVE                Bilirubin, Urine              NEGATIVE                Urobilinogen, Urine           Normal                 Nitrite, Urine                NEGATIVE                Leukocyte Esterase, Urine     NEGATIVE             SERIAL TROPONIN, 1 HOUR - Abnormal     Troponin I, High Sensitivity   194 (*)                  Narrative: Less than 99th percentile of normal range cutoff-                Female and children under 18 years old <14 ng/L; Male <21 ng/L: Negative                Repeat testing should be performed if clinically indicated.                                 Female and children under 18 years old 14-50 ng/L; Male 21-50 ng/L:                Consistent with possible cardiac damage and possible increased clinical                 risk. Serial measurements may help to assess extent of myocardial damage.                                 >50 ng/L: Consistent with cardiac damage, increased clinical risk and                myocardial infarction. Serial measurements may help assess extent of                 myocardial damage.                                  NOTE: Children less than 1 year old may have higher baseline troponin                 levels and results should be interpreted in  conjunction with the overall                 clinical context.                                 NOTE: Troponin I testing is performed using a different                 testing methodology at [facility] than at other                 [facility]. Direct result comparisons should only                 be made within the same method.  LACTATE - Normal     Lactate                       1.1                      Narrative: Venipuncture immediately after or during the administration of Metamizole may lead to falsely low results. Testing should be performed immediately prior to Metamizole dosing.  TROPONIN SERIES- (INITIAL, 1 HR)       Narrative: The following orders were created for panel order Troponin I Series, High Sensitivity (0, 1 HR).                Procedure                               Abnormality         Status                                   ---------                               -----------         ------                                   Troponin I, High Sensiti...[[id number]]  Abnormal            Final result                             Troponin, High Sensitivi...[[id number]]  Abnormal            Final result                                             Please view results for these tests on the individual orders.  URINALYSIS WITH REFLEX CULTURE AND MICROSCOPIC       Narrative: The following orders were created for panel order Urinalysis with Reflex Culture and Microscopic.                Procedure                               Abnormality         Status                                   ---------                               -----------         ------                                   Urinalysis with Reflex C...[[id number]]  Abnormal            Final result                             Extra Urine Gray Tube[[id number]]                            In process                                               Please view results for these tests on the individual orders.  EXTRA URINE GRAY TUBE     XR  chest 1 view   Final Result    1. Chronic lung disease with emphysematous changes.    2. Enlarged cardiac silhouette with vascular congestion. Bilateral    small effusions                      MACRO:    None          Signed by: Nilay Louie 6/22/2025 7:10 PM    Dictation workstation:   [id number]                      Patient History   Medical History[1]  Surgical History[2]  Family History[3]  Social History[4]    Physical Exam   ED Triage Vitals [06/22/25 1707]   Temperature Heart Rate Respirations BP   36.6 °C (97.9 °F) 68 17 113/71      Pulse Ox Temp Source Heart Rate Source Patient Position   97 % Temporal Monitor Sitting      BP Location FiO2 (%)     Left arm --       Physical Exam      ED Course & MDM   Diagnoses as of 06/22/25 2119   Elevated troponin                 No data recorded     Marry Coma Scale Score: 15 (06/22/25 1904 : Anatoly Rankin RN)                           Medical Decision Making      Procedure  Procedures         [1]   Past Medical History:  Diagnosis Date    Abnormal result of other cardiovascular function study     Abnormal nuclear stress test    Bicipital tendinitis, left shoulder 07/07/2021    Biceps tendinitis, left    Bicipital tendinitis, right shoulder 03/04/2021    Biceps tendinitis, right    Pain in left hip 10/17/2019    Acute pain of left hip    Personal history of malignant neoplasm of prostate     History of malignant neoplasm of prostate    Personal history of other benign neoplasm 06/13/2018    History of other benign neoplasm    Personal history of other diseases of the circulatory system     History of cardiac disorder    Personal history of other diseases of the circulatory system     History of essential hypertension    Personal history of other diseases of the circulatory system     History of aortic valve stenosis    Personal history of other diseases of the circulatory system     History of angina pectoris    Personal history of other diseases of the  respiratory system 12/31/2018    History of acute bacterial sinusitis   [2]   Past Surgical History:  Procedure Laterality Date    ANKLE ARTHROSCOPY W/ OPEN REPAIR  02/18/2016    Ankle Repair    CATARACT EXTRACTION  06/07/2018    Cataract Surgery    COLONOSCOPY  04/13/2018    Colonoscopy    CORONARY ANGIOPLASTY WITH STENT PLACEMENT  02/18/2016    Cath Stent Placement    HERNIA REPAIR  02/18/2016    Hernia Repair    PROSTATE SURGERY  04/13/2018    Prostate Surgery   [3]   Family History  Problem Relation Name Age of Onset    Cerebral aneurysm Mother      Heart disease Brother     [4]   Social History  Tobacco Use    Smoking status: Never    Smokeless tobacco: Never   Substance Use Topics    Alcohol use: Yes     Comment: rare    Drug use: Never        Tamanna Do MD  06/22/25 0769

## 2025-06-22 NOTE — ED TRIAGE NOTES
Pt coming in for lethargy. Pts son also thinks that he is dehydrated. Pt states that he feels tired all the time.

## 2025-06-23 ENCOUNTER — APPOINTMENT (OUTPATIENT)
Dept: CARDIOLOGY | Facility: HOSPITAL | Age: OVER 89
DRG: 280 | End: 2025-06-23
Payer: MEDICARE

## 2025-06-23 PROBLEM — I50.23 ACUTE ON CHRONIC SYSTOLIC (CONGESTIVE) HEART FAILURE: Status: ACTIVE | Noted: 2023-04-19

## 2025-06-23 LAB
ANION GAP SERPL CALC-SCNC: 18 MMOL/L (ref 10–20)
AORTIC VALVE MEAN GRADIENT: 7 MMHG
AORTIC VALVE PEAK VELOCITY: 1.82 M/S
AV PEAK GRADIENT: 13 MMHG
BUN SERPL-MCNC: 45 MG/DL (ref 6–23)
CALCIUM SERPL-MCNC: 8.9 MG/DL (ref 8.6–10.3)
CHLORIDE SERPL-SCNC: 104 MMOL/L (ref 98–107)
CO2 SERPL-SCNC: 26 MMOL/L (ref 21–32)
CREAT SERPL-MCNC: 1.86 MG/DL (ref 0.5–1.3)
EGFRCR SERPLBLD CKD-EPI 2021: 33 ML/MIN/1.73M*2
EJECTION FRACTION APICAL 4 CHAMBER: 28.9
EJECTION FRACTION: 23 %
ERYTHROCYTE [DISTWIDTH] IN BLOOD BY AUTOMATED COUNT: 14.4 % (ref 11.5–14.5)
GLUCOSE SERPL-MCNC: 98 MG/DL (ref 74–99)
HCT VFR BLD AUTO: 41.9 % (ref 41–52)
HGB BLD-MCNC: 12.9 G/DL (ref 13.5–17.5)
HOLD SPECIMEN: NORMAL
LEFT VENTRICLE INTERNAL DIMENSION DIASTOLE: 5.4 CM (ref 3.5–6)
MAGNESIUM SERPL-MCNC: 2.42 MG/DL (ref 1.6–2.4)
MCH RBC QN AUTO: 31.4 PG (ref 26–34)
MCHC RBC AUTO-ENTMCNC: 30.8 G/DL (ref 32–36)
MCV RBC AUTO: 102 FL (ref 80–100)
NRBC BLD-RTO: 0 /100 WBCS (ref 0–0)
PLATELET # BLD AUTO: 128 X10*3/UL (ref 150–450)
POTASSIUM SERPL-SCNC: 3.8 MMOL/L (ref 3.5–5.3)
RBC # BLD AUTO: 4.11 X10*6/UL (ref 4.5–5.9)
RIGHT VENTRICLE FREE WALL PEAK S': 6.74 CM/S
SODIUM SERPL-SCNC: 144 MMOL/L (ref 136–145)
WBC # BLD AUTO: 5.9 X10*3/UL (ref 4.4–11.3)

## 2025-06-23 PROCEDURE — 2500000002 HC RX 250 W HCPCS SELF ADMINISTERED DRUGS (ALT 637 FOR MEDICARE OP, ALT 636 FOR OP/ED)

## 2025-06-23 PROCEDURE — 99232 SBSQ HOSP IP/OBS MODERATE 35: CPT

## 2025-06-23 PROCEDURE — 2500000001 HC RX 250 WO HCPCS SELF ADMINISTERED DRUGS (ALT 637 FOR MEDICARE OP): Performed by: STUDENT IN AN ORGANIZED HEALTH CARE EDUCATION/TRAINING PROGRAM

## 2025-06-23 PROCEDURE — 80048 BASIC METABOLIC PNL TOTAL CA: CPT | Performed by: STUDENT IN AN ORGANIZED HEALTH CARE EDUCATION/TRAINING PROGRAM

## 2025-06-23 PROCEDURE — 97165 OT EVAL LOW COMPLEX 30 MIN: CPT | Mod: GO

## 2025-06-23 PROCEDURE — 83735 ASSAY OF MAGNESIUM: CPT | Performed by: STUDENT IN AN ORGANIZED HEALTH CARE EDUCATION/TRAINING PROGRAM

## 2025-06-23 PROCEDURE — 97161 PT EVAL LOW COMPLEX 20 MIN: CPT | Mod: GP

## 2025-06-23 PROCEDURE — 85027 COMPLETE CBC AUTOMATED: CPT | Performed by: STUDENT IN AN ORGANIZED HEALTH CARE EDUCATION/TRAINING PROGRAM

## 2025-06-23 PROCEDURE — 2500000002 HC RX 250 W HCPCS SELF ADMINISTERED DRUGS (ALT 637 FOR MEDICARE OP, ALT 636 FOR OP/ED): Performed by: STUDENT IN AN ORGANIZED HEALTH CARE EDUCATION/TRAINING PROGRAM

## 2025-06-23 PROCEDURE — 93306 TTE W/DOPPLER COMPLETE: CPT

## 2025-06-23 PROCEDURE — 36415 COLL VENOUS BLD VENIPUNCTURE: CPT | Performed by: STUDENT IN AN ORGANIZED HEALTH CARE EDUCATION/TRAINING PROGRAM

## 2025-06-23 PROCEDURE — 2500000004 HC RX 250 GENERAL PHARMACY W/ HCPCS (ALT 636 FOR OP/ED): Performed by: STUDENT IN AN ORGANIZED HEALTH CARE EDUCATION/TRAINING PROGRAM

## 2025-06-23 PROCEDURE — 1200000002 HC GENERAL ROOM WITH TELEMETRY DAILY

## 2025-06-23 PROCEDURE — 93306 TTE W/DOPPLER COMPLETE: CPT | Performed by: INTERNAL MEDICINE

## 2025-06-23 PROCEDURE — G0378 HOSPITAL OBSERVATION PER HR: HCPCS

## 2025-06-23 PROCEDURE — 99223 1ST HOSP IP/OBS HIGH 75: CPT | Performed by: INTERNAL MEDICINE

## 2025-06-23 RX ORDER — POTASSIUM CHLORIDE 20 MEQ/1
20 TABLET, EXTENDED RELEASE ORAL ONCE
Status: COMPLETED | OUTPATIENT
Start: 2025-06-23 | End: 2025-06-23

## 2025-06-23 RX ADMIN — METOPROLOL SUCCINATE 25 MG: 25 TABLET, EXTENDED RELEASE ORAL at 10:54

## 2025-06-23 RX ADMIN — POTASSIUM CHLORIDE 20 MEQ: 1500 TABLET, EXTENDED RELEASE ORAL at 10:53

## 2025-06-23 RX ADMIN — DAPAGLIFLOZIN 10 MG: 10 TABLET, FILM COATED ORAL at 11:08

## 2025-06-23 RX ADMIN — APIXABAN 2.5 MG: 2.5 TABLET, FILM COATED ORAL at 21:49

## 2025-06-23 RX ADMIN — ROSUVASTATIN CALCIUM 5 MG: 10 TABLET, FILM COATED ORAL at 10:53

## 2025-06-23 RX ADMIN — FUROSEMIDE 20 MG: 10 INJECTION, SOLUTION INTRAMUSCULAR; INTRAVENOUS at 10:53

## 2025-06-23 RX ADMIN — FUROSEMIDE 20 MG: 10 INJECTION, SOLUTION INTRAMUSCULAR; INTRAVENOUS at 16:36

## 2025-06-23 RX ADMIN — APIXABAN 2.5 MG: 2.5 TABLET, FILM COATED ORAL at 10:53

## 2025-06-23 SDOH — ECONOMIC STABILITY: FOOD INSECURITY: HOW HARD IS IT FOR YOU TO PAY FOR THE VERY BASICS LIKE FOOD, HOUSING, MEDICAL CARE, AND HEATING?: PATIENT DECLINED

## 2025-06-23 SDOH — ECONOMIC STABILITY: HOUSING INSECURITY: AT ANY TIME IN THE PAST 12 MONTHS, WERE YOU HOMELESS OR LIVING IN A SHELTER (INCLUDING NOW)?: PATIENT DECLINED

## 2025-06-23 SDOH — ECONOMIC STABILITY: HOUSING INSECURITY: IN THE PAST 12 MONTHS, HOW MANY TIMES HAVE YOU MOVED WHERE YOU WERE LIVING?: 1

## 2025-06-23 SDOH — SOCIAL STABILITY: SOCIAL INSECURITY: WITHIN THE LAST YEAR, HAVE YOU BEEN HUMILIATED OR EMOTIONALLY ABUSED IN OTHER WAYS BY YOUR PARTNER OR EX-PARTNER?: NO

## 2025-06-23 SDOH — ECONOMIC STABILITY: INCOME INSECURITY
IN THE PAST 12 MONTHS HAS THE ELECTRIC, GAS, OIL, OR WATER COMPANY THREATENED TO SHUT OFF SERVICES IN YOUR HOME?: PATIENT DECLINED

## 2025-06-23 SDOH — ECONOMIC STABILITY: FOOD INSECURITY
WITHIN THE PAST 12 MONTHS, YOU WORRIED THAT YOUR FOOD WOULD RUN OUT BEFORE YOU GOT THE MONEY TO BUY MORE.: PATIENT DECLINED

## 2025-06-23 SDOH — SOCIAL STABILITY: SOCIAL INSECURITY: WITHIN THE LAST YEAR, HAVE YOU BEEN AFRAID OF YOUR PARTNER OR EX-PARTNER?: NO

## 2025-06-23 SDOH — ECONOMIC STABILITY: FOOD INSECURITY: WITHIN THE PAST 12 MONTHS, THE FOOD YOU BOUGHT JUST DIDN'T LAST AND YOU DIDN'T HAVE MONEY TO GET MORE.: PATIENT DECLINED

## 2025-06-23 SDOH — ECONOMIC STABILITY: HOUSING INSECURITY: IN THE LAST 12 MONTHS, WAS THERE A TIME WHEN YOU WERE NOT ABLE TO PAY THE MORTGAGE OR RENT ON TIME?: PATIENT DECLINED

## 2025-06-23 SDOH — ECONOMIC STABILITY: TRANSPORTATION INSECURITY
IN THE PAST 12 MONTHS, HAS LACK OF TRANSPORTATION KEPT YOU FROM MEDICAL APPOINTMENTS OR FROM GETTING MEDICATIONS?: PATIENT DECLINED

## 2025-06-23 ASSESSMENT — COGNITIVE AND FUNCTIONAL STATUS - GENERAL
TURNING FROM BACK TO SIDE WHILE IN FLAT BAD: A LITTLE
TOILETING: A LITTLE
WALKING IN HOSPITAL ROOM: A LITTLE
CLIMB 3 TO 5 STEPS WITH RAILING: A LOT
DAILY ACTIVITIY SCORE: 21
HELP NEEDED FOR BATHING: A LITTLE
STANDING UP FROM CHAIR USING ARMS: A LITTLE
WALKING IN HOSPITAL ROOM: A LITTLE
MOVING FROM LYING ON BACK TO SITTING ON SIDE OF FLAT BED WITH BEDRAILS: A LITTLE
CLIMB 3 TO 5 STEPS WITH RAILING: A LOT
DRESSING REGULAR LOWER BODY CLOTHING: A LITTLE
TOILETING: A LITTLE
MOBILITY SCORE: 21
MOVING TO AND FROM BED TO CHAIR: A LITTLE
HELP NEEDED FOR BATHING: A LITTLE
DRESSING REGULAR UPPER BODY CLOTHING: A LITTLE
MOBILITY SCORE: 17
DRESSING REGULAR LOWER BODY CLOTHING: A LITTLE
DAILY ACTIVITIY SCORE: 20

## 2025-06-23 ASSESSMENT — PAIN - FUNCTIONAL ASSESSMENT
PAIN_FUNCTIONAL_ASSESSMENT: 0-10

## 2025-06-23 ASSESSMENT — PAIN SCALES - GENERAL
PAINLEVEL_OUTOF10: 0 - NO PAIN

## 2025-06-23 ASSESSMENT — ACTIVITIES OF DAILY LIVING (ADL)
ADL_ASSISTANCE: INDEPENDENT
BATHING_ASSISTANCE: STAND BY
LACK_OF_TRANSPORTATION: PATIENT DECLINED

## 2025-06-23 NOTE — CARE PLAN
"The patient's goals for the shift include      The clinical goals for the shift include \"get something pudding to eat\"    Over the shift, the patient did not make progress toward the following goals.  Recommendations to address these barriers include continue to monitor.    "

## 2025-06-23 NOTE — CONSULTS
"Consults  History Of Present Illness:    Sravan Christiansen is a 89 y.o. male presenting with increased dyspnea.  He has PMH of HFrEF and persistent atrial flutter..  Notes EDWARDS over the plast couple of weeks with walking short distances such as from bedroome to bathroom.  He called the office 6/12/25 and was switchecd from Lasix to Torsemide 20 twie daily.  He is c=scheduled to see Dr. Valdez 6/26/25.  No chest pain, no edemal no palpiations     Last Recorded Vitals:  Vitals:    06/22/25 2031 06/22/25 2200 06/22/25 2321 06/23/25 0604   BP: 121/86 115/71 110/75 106/65   BP Location:    Left arm   Patient Position: Sitting  Sitting Lying   Pulse: 66 66 66 67   Resp: (!) 26 14 16 16   Temp: 36.8 °C (98.2 °F)  36.5 °C (97.7 °F) 37 °C (98.6 °F)   TempSrc: Temporal  Temporal Temporal   SpO2: 96% (!) 92% 95% 94%   Weight:   60.3 kg (132 lb 15 oz)    Height:   1.6 m (5' 3\")        Last Labs:  CBC - 6/23/2025:  6:43 AM  5.9 12.9 128    41.9      CMP - 6/23/2025:  6:43 AM  8.9 6.6 18 --- 1.8   4.8 4.1 16 66      PTT - No results in last year.  1.8   19.8 _     Troponin I, High Sensitivity   Date/Time Value Ref Range Status   06/22/2025 08:19  (HH) 0 - 20 ng/L Final     Comment:     Previous result verified on 6/22/2025 1856 on specimen/case [id number] called with component TRPHS for procedure Troponin I, High Sensitivity, Initial with value 201 ng/L.   06/22/2025 06:10  (HH) 0 - 20 ng/L Final   01/01/2025 10:08  (HH) 0 - 20 ng/L Final     Comment:     Previous result verified on 1/1/2025 1733 on specimen/case [id number] called with component TRPHS for procedure Troponin I, High Sensitivity with value 102 ng/L.     B TYPE NATRIURETIC PEPTIDE (BNP)   Date/Time Value Ref Range Status   03/20/2025 11:10 AM 2,247 (H) <100 pg/mL Final     Comment:        BNP levels increase with age in the general  population with the highest values seen in  individuals greater than 75 years of age.  Reference: J. Am. " Shruti. Cardiol. 2002; 40:976-982.          BNP   Date/Time Value Ref Range Status   06/22/2025 06:10 PM 4,584 (H) 0 - 99 pg/mL Final   01/01/2025 04:47 PM 2,881 (H) 0 - 99 pg/mL Final     LDL Calculated   Date/Time Value Ref Range Status   05/06/2024 10:04 AM 60 <=99 mg/dL Final     Comment:                                 Near   Borderline      AGE      Desirable  Optimal    High     High     Very High     0-19 Y     0 - 109     ---    110-129   >/= 130     ----    20-24 Y     0 - 119     ---    120-159   >/= 160     ----      >24 Y     0 -  99   100-129  130-159   160-189     >/=190       VLDL   Date/Time Value Ref Range Status   05/06/2024 10:04 AM 13 0 - 40 mg/dL Final   05/04/2023 09:05 AM 19 0 - 40 mg/dL Final      Last I/O:  I/O last 3 completed shifts:  In: - (0 mL/kg)   Out: 1000 (16.6 mL/kg) [Urine:1000 (0.5 mL/kg/hr)]  Weight: 60.3 kg     Past Cardiology Tests (Last 3 Years):  EKG:  ECG 12 lead 06/22/2025 (Preliminary)      Electrocardiogram, 12-lead PRN ACS symptoms 01/03/2025      ECG 12 lead 01/01/2025      ECG 12 lead (Clinic Performed) 08/16/2024    Echo:  Transthoracic Echo Complete 06/23/2025 LVEF = 20-25%      Transthoracic echo (TTE) complete 11/13/2024      Transthoracic echo (TTE) limited 04/18/2024      Fetal echo complete     Ejection Fractions:  EF   Date/Time Value Ref Range Status   06/23/2025 09:38 AM 23 %    11/13/2024 12:10 PM 20 %      Cath:  No results found for this or any previous visit from the past 1095 days.    Stress Test:  No results found for this or any previous visit from the past 1095 days.    Cardiac Imaging:  No results found for this or any previous visit from the past 1095 days.      Past Medical History:  He has a past medical history of Abnormal result of other cardiovascular function study, Bicipital tendinitis, left shoulder (07/07/2021), Bicipital tendinitis, right shoulder (03/04/2021), Pain in left hip (10/17/2019), Personal history of malignant neoplasm of  prostate, Personal history of other benign neoplasm (06/13/2018), Personal history of other diseases of the circulatory system, Personal history of other diseases of the circulatory system, Personal history of other diseases of the circulatory system, Personal history of other diseases of the circulatory system, and Personal history of other diseases of the respiratory system (12/31/2018).    Past Surgical History:  He has a past surgical history that includes Ankle arthroscopy w/ open repair (02/18/2016); Coronary angioplasty with stent (02/18/2016); Hernia repair (02/18/2016); Cataract extraction (06/07/2018); Colonoscopy (04/13/2018); and Prostate surgery (04/13/2018).      Social History:  He reports that he has never smoked. He has never used smokeless tobacco. He reports current alcohol use. He reports that he does not use drugs.    Family History:  Family History[1]     Allergies:  Penicillins    Inpatient Medications:  Scheduled Medications[2]  PRN Medications[3]  Continuous Medications[4]  Outpatient Medications:  Current Outpatient Medications   Medication Instructions    apixaban (ELIQUIS) 2.5 mg, oral, 2 times daily    Farxiga 10 mg, oral, Daily    lisinopril 5 mg, oral, Daily    LORazepam (ATIVAN) 0.5 mg, oral, Daily PRN    metoprolol succinate XL (TOPROL-XL) 25 mg, oral, Daily, Do not crush or chew.    rosuvastatin (CRESTOR) 5 mg, oral, Daily    torsemide (DEMADEX) 20 mg, oral, Daily       Physical Exam:  GEN: Frail 89 yo wm sitting over the side of the bed  HEENT: Atraumatic, normocephali  COR: Reg  LUNGS: Decreased breath sounds throughout  EXT: No edema     Assessment/Plan   1.) Acute on chronic HFrEF  2.) Atrial flutter with controlled VR  3.) Aortic stenosis S/P TAVR  4.) HTN  5.) HLE  6.) CKD IIIB  7.) Asending aortic aneurys  REC:  1.) Continue IV Lasix  2.) If clinically iproved could discharge 6/24/25 to see GEORGE Krasu 6/26/25 as scheduled        Thank you for the consulyation                                   Will follow with you                                                        MAIKEL  2.) If clinically improved could discarge home     Peripheral IV 06/22/25 20 G Anterior;Right Forearm (Active)   Site Assessment Clean;Dry;Intact 06/22/25 2100   Dressing Status Clean;Dry 06/22/25 2100   Number of days: 1       Code Status:  Full Code    I spent 30 minutes in the professional and overall care of this patient.        Nelson Perez MD       [1]   Family History  Problem Relation Name Age of Onset    Cerebral aneurysm Mother      Heart disease Brother     [2]   Scheduled medications   Medication Dose Route Frequency    apixaban  2.5 mg oral BID    dapagliflozin propanediol  10 mg oral Daily    furosemide  20 mg intravenous BID    metoprolol succinate XL  25 mg oral Daily    rosuvastatin  5 mg oral Daily   [3]   PRN medications   Medication    LORazepam   [4]   Continuous Medications   Medication Dose Last Rate

## 2025-06-23 NOTE — PROGRESS NOTES
Subjective   Sravan Christiansen is a 89 y.o. male who presents with Weakness, Gen.    Patient walking around room with some improvement in dyspnea since yesterday. Denies LE swelling nor orthopnea.    Objective   Vitals:    06/23/25 1159   BP: 106/71   Pulse: 69   Resp:    Temp: 36.7 °C (98.1 °F)   SpO2: 94%      Physical Exam  Physical Exam:  Constitutional: frail, awake, alert, no acute distress  ENMT: mucous membranes moist, EOMI, conjunctivae clear  Head/Neck: normocephalic, atraumatic; supple, trachea midline  Respiratory/Thorax: patent airways, CTAB; no wheezes, rales, or rhonchi  Cardiovascular: afib/aflutter  Gastrointestinal: soft, nondistended, non-tender, bowel sounds appreciated  Extremities: palpable peripheral pulses, no edema or cyanosis  Neurological: AO x3, no focal deficits  Psychological: appropriate mood and behavior  Skin: warm and dry    Assessment/Plan       Sravan Christiansen is a 89 yo male with PMH of HFrEF with EF 20% (11/2024), pAflutter/Afib, on Eliquis, aortic stenosis s/p TAVR, CAD, HTN, HLD, AA, CKD3b, prostate cancer, anxiety who presented with dyspnea on exertion.    Acute medical conditions:  #Acute on chronic HFrEF 20% (11/2024)  #Type II MI  -BNP >4K on admission with trop 201, 194  -Recent change to home diuretics, had called cardiology office 6-25 with switch from Lasix 20mg BID to torsemide 20mg daily  -Repeat echo this admission showed EF 20-25%, unchanged from prior  -Cardio consulted, appreciate recommendations, plan to continue 20mg IV lasix BID today with likely DC tomorrow on 20 mg torsemide daily  -Continue home Farxiga, Tropol XL  -PT/OT/Nutrition    Chronic medical conditions:  #p Aflutter/fib, on Eliquis  #Aortic stenosis s/p TAVR  #CAD  #HTN, HLD  #AAA  #CKD 3b  #Prostate cancer  #Anxiety  -Continue home medications: Eliquis, Crestor,     DVT PPX: Eliquis  Diet: Cardiac  IVF:  Code Status: FULL    This is a preliminary note written by the resident. Please wait for  attending addendum for finalization of note and recommendations.    Frtiz Newberry DO, PhD  Internal Medicine PGY2

## 2025-06-23 NOTE — PROGRESS NOTES
Physical Therapy    Physical Therapy Evaluation    Patient Name: Sravan Christiansen  MRN: [id number]  Today's Date: 6/23/2025   Time Calculation  Start Time: 1131  Stop Time: 1148  Time Calculation (min): 17 min  328/328-A    Assessment/Plan   PT Assessment  PT Assessment Results: Decreased strength, Impaired balance, Decreased mobility  Rehab Prognosis: Good  Barriers to Discharge Home: Physical needs  Physical Needs: Stair navigation into home limited by function/safety  Evaluation/Treatment Tolerance: Patient tolerated treatment well  Medical Staff Made Aware: Yes  Strengths: Attitude of self  Barriers to Participation: Comorbidities  End of Session Communication: Bedside nurse  Assessment Comment: pt tolerated session. pt required assist during functional mobility to maintain safety. pt presented with decreased functional mobility, strength, and balance. pt would benefit from low int therapy in order to return to PLOF.  End of Session Patient Position: Up in chair, Alarm on (call light in reach)  IP OR SWING BED PT PLAN  Inpatient or Swing Bed: Inpatient  PT Plan  Treatment/Interventions: Bed mobility, Transfer training, Gait training, Balance training, Strengthening, Therapeutic exercise, Therapeutic activity  PT Plan: Ongoing PT  PT Frequency: 2 times per week  PT Discharge Recommendations: Low intensity level of continued care  PT Recommended Transfer Status: Assist x1  PT - OK to Discharge: Yes (once medically cleared)    Subjective     Current Problem:  1. Elevated troponin        2. Acute on chronic systolic (congestive) heart failure  Transthoracic Echo Complete    Transthoracic Echo Complete      3. Chronic systolic (congestive) heart failure  Transthoracic Echo Complete      4. Acute systolic (congestive) heart failure  Transthoracic Echo Complete        Problem List[1]    General Visit Information:  General  Reason for Referral: [place] (admitted 6/22 with acute on chronic CHF and generalized  weakness)  Referred By: Gregory  Past Medical History Relevant to Rehab: afib, anxiety, CAD, CKD, HTN, HLD, prostate cancer, AAA, ankle repair, hernia repair, prostate surgery  Family/Caregiver Present: No  Co-Treatment: OT  Co-Treatment Reason: to maximize pt safety and mobility  Prior to Session Communication: Bedside nurse  Patient Position Received: Up in chair, Alarm on  General Comment: pt agreeable to therapy    Home Living:  Home Living  Home Living Comments: lives alone in house with [place] and rail. laundry in basement (Son and DIL complete). has WIS with GBs and HHS. has elevated toilet with GBs. owns w/w and cane. amb IND. IND in ADLs. IND cleaning/cooking. drives. sleeps in flat bed. hx of falls.    Prior Level of Function:  Prior Function Per Pt/Caregiver Report  Level of [place]: Independent with ADLs and functional transfers, Independent with homemaking with ambulation    Precautions:  Precautions  Precautions Comment: falls, alarm, OOB with assist, strict I and O    Vital Signs:  Vital Signs  Vital Signs Comment: VSS  Objective     Pain:  Pain Assessment  Pain Assessment: 0-10  0-10 (Numeric) Pain Score: 0 - No pain    Cognition:  Cognition  Overall Cognitive Status: Within Functional Limits    General Assessments:         Sensation  Light Touch: No apparent deficits  Strength  Strength Comments: BLE strength 4-/5 grossly. BLE ROM WFL for pts age                   Functional Assessments:     Bed Mobility  Bed Mobility: No  Transfers  Transfer: Yes  Transfer 1  Transfer From 1: Bed to, Sit to, Stand to  Transfer Device 1: Walker  Trials/Comments 1: IND  Ambulation/Gait Training  Ambulation/Gait Training Performed: Yes  Ambulation/Gait Training 1  Surface 1: Level tile  Device 1: Rolling walker  Comments/Distance (ft) 1: completed amb of 75 ft with HHA and SBA; completed amb 10ft with SBA                    Outcome Measures:     [facility] Basic Mobility  Turning from your back to your side while in a flat  bed without using bedrails: None  Moving from lying on your back to sitting on the side of a flat bed without using bedrails: None  Moving to and from bed to chair (including a wheelchair): None  Standing up from a chair using your arms (e.g. wheelchair or bedside chair): None  To walk in hospital room: A little  Climbing 3-5 steps with railing: A lot  Basic Mobility - Total Score: 21                                                             Goals:  Encounter Problems       Encounter Problems (Active)       PT Problem       STG - Pt will perform a B LE ther ex program of 2-3 sets of 10  (Progressing)       Start:  06/23/25    Expected End:  07/07/25            STG - Pt will amb 100' IND  (Progressing)       Start:  06/23/25    Expected End:  07/07/25            STG -  Pt will navigate 4 stairs using rail with SBA  (Progressing)       Start:  06/23/25    Expected End:  07/07/25            STG - Pt will demonstrate good dynamic/static standing/seated balance with no LOB noted   (Progressing)       Start:  06/23/25    Expected End:  07/07/25               Pain - Adult            Education Documentation  Mobility Training, taught by Paola Weathers, PT at 6/23/2025 12:19 PM.  Learner: Patient  Readiness: Acceptance  Method: Explanation  Response: Verbalizes Understanding  Comment: PT POC    Education Comments  No comments found.              [1]   Patient Active Problem List  Diagnosis    Coronary artery disease with stable angina pectoris    Dyshidrosis    Essential hypertension    Femoral hernia    Gait instability    Generalized osteoarthritis of multiple sites    Herpes simplex    Hordeolum externum of right lower eyelid    Hyperhidrosis    Hyperlipidemia, mixed    Muscle strain of left lower leg    Presence of stent in coronary artery    Sebaceous cyst    Shoulder pain    Situational anxiety    Tinnitus    TMJ arthritis    Weakness of both lower extremities    Acute on chronic systolic (congestive) heart failure     Hearing loss, bilateral    Multinodular thyroid    Multiple contusions    Palpitations    Scalp laceration, sequela    Thyromegaly    Hematoma of IV site    Hypoxia    Multifocal pneumonia    Carcinoma of prostate    Fracture, [place], left, closed    Pulmonary nodule    S/P TAVR (transcatheter aortic valve replacement)    Benign lipomatous neoplasm    Rotator cuff arthropathy of both shoulders    Thoracic aortic aneurysm, without rupture, unspecified    Chronic fatigue    Cachexia (Multi)    Stage 3a chronic kidney disease (Multi)    Dyspnea    Pain in right hand    Typical atrial flutter (Multi)    Elevated troponin

## 2025-06-23 NOTE — H&P
Subjective   Sravan Christiansen is a 89 y.o. male who presents for Weakness, Gen.    HPI  This is a 89-year-old male who presented to [facility] on 6/22/25 with increasing fatigue and EDWARDS over the last couple weeks.  He reports feeling SOB with walking short distances now, such as from bedroom to bathroom.  He otherwise denies any chest pain, orthopnea, or BLE edema.  Of note, he recently had a mechanical fall at home on 5/26 from which he sustained minimally displaced fractures through anterior left 9th and 10th ribs.  Workup thus far notable for markedly elevated BNP at 4,584.  Troponins elevated but flat at 201, 194.  EKG shows atrial aflutter.  CXR shows vascular congestion with small pleural effusions.    PMH:  HFrEF with EF 20% as of 11/2024  Paroxysmal aflutter/fib, on Eliquis  Aortic stenosis s/p TAVR  CAD  HTN  HLD  Ascending aortic aneurysm  CKD 3b  Prostate cancer  Situational anxiety    PSH: Ankle repair, cataract surgery, LHC, colonoscopy, hernia repair, prostate surgery  FH: Cerebral aneurysm, CAD  SH: Former smoker, quit in mid 20s.  Occasional alcohol use.  Lives at home alone.  Ambulates independently.    Review of Systems:  12-point ROS was reviewed and is negative, unless otherwise noted in HPI    Objective   Vitals:    06/22/25 2031   BP: 121/86   Pulse: 66   Resp: (!) 26   Temp: 36.8 °C (98.2 °F)   SpO2: 96%       Physical Exam:   Constitutional: frail-appearing, awake, alert, no acute distress  ENMT: mucous membranes moist, conjunctivae clear  Head/Neck: normocephalic, atraumatic; supple, trachea midline  Respiratory/Thorax: patent airways, CTAB; no wheezes, rales, or rhonchi  Cardiovascular: RRR, no murmur appreciated  Gastrointestinal: soft, nondistended, non-tender, bowel sounds appreciated  Extremities: palpable peripheral pulses, no edema  Neurological: AO x3, no focal deficits  Psychological: appropriate mood and behavior  Skin: warm and dry    Scheduled Medications:   Scheduled Medications[1]    Continuous Medications:   Continuous Medications[2]   PRN Medications:   PRN Medications[3]    Assessment/Plan     Acute on chronic HFrEF with EF 20% as of 11/2024  Elevated troponin, likely type II MI  Recent anterior left 9th and 10th rib fractures s/p mechanical fall    Paroxysmal aflutter/fib, on Eliquis  Aortic stenosis s/p TAVR  CAD  HTN, HLD  Ascending aortic aneurysm  CKD 3b  Prostate cancer  Situational anxiety    Pt without overt signs of fluid overload on exam. However, he has symptoms of fatigue, SOB with minimal exertion, elevated BNP 4k, and vascular congestion seen on CXR. His home diuretic regimen had just been changed from Lasix 20mg BID to Torsemide 20mg daily, and he recently completed cardiac rehab on 6/18 (recent note states he was unable to tolerate treadmill since his fall on 5/26). S/p Lasix 40mg IVP in the ED. Will continue with gentle IV diuresis with close monitoring of his renal function, UOP. TTE pending. Cardiology consulted. Continue home Farxiga, BB. Per outpatient notes, he is not fully maximized on GDMT due to issues with hypoTN.  Re: elevated troponin, likely demand ischemia in setting of ADHF, CKD. EKG without acute ischemic changes. Pt denies any chest pain.  PT/OT, fall precautions. Encourage IS use.    DVT PPX: Eliquis  Code status: FULL - Discussed with pt, accompanied by son.    Abi Delfino, [facility] Medicine         [1] [2] [3]

## 2025-06-23 NOTE — PROGRESS NOTES
Occupational Therapy    Occupational Therapy    Evaluation    Patient Name: Sravan Christiansen  MRN: [id number]  Today's Date: 6/23/2025  Time Calculation  Start Time: 1130  Stop Time: 1149  Time Calculation (min): 19 min  328/328-A    Assessment  IP OT Assessment  OT Assessment: Pt. presents with a slight decline in self-care and mobility and would benefit from skilled OT services to maximize independence and promote return to prior level of function.  Prognosis: Good  Barriers to Discharge Home: No anticipated barriers  Evaluation/Treatment Tolerance: Patient tolerated treatment well  Medical Staff Made Aware: Yes  End of Session Communication: Bedside nurse  End of Session Patient Position: Up in chair, Alarm off, not on at start of session (call light in reach. all needs met)    Plan:  Treatment Interventions: ADL retraining, Functional transfer training, Endurance training, Patient/family training, Equipment evaluation/education  OT Frequency: 3 times per week  OT Discharge Recommendations: Low intensity level of continued care (initial 24 hr. supervision/support)  Equipment Recommended upon Discharge:  (shower chair)  OT Recommended Transfer Status: Stand by assist  OT - OK to Discharge: Yes (once cleared by medical team)    Subjective     Current Problem:  1. Elevated troponin        2. Acute on chronic systolic (congestive) heart failure  Transthoracic Echo Complete    Transthoracic Echo Complete      3. Chronic systolic (congestive) heart failure  Transthoracic Echo Complete      4. Acute systolic (congestive) heart failure  Transthoracic Echo Complete          General:  General  Reason for Referral: OT eval and treat for adls  Referred By: Abi Saleh DO  Past Medical History Relevant to Rehab: This is a 89-year-old male who presented to [facility] on [facility]/22/25 with increasing fatigue and EDWARDS over the last couple weeks.  He reports feeling SOB with walking short distances now, such as from bedroom to bathroom.  He  otherwise denies any chest pain, orthopnea, or BLE edema.  Of note, he recently had a mechanical fall at home on 5/26 from which he sustained minimally displaced fractures through anterior left 9th and 10th ribs.  Family/Caregiver Present: No  Co-Treatment: PT  Co-Treatment Reason: to maximize pt. safety and mobility  Prior to Session Communication: Bedside nurse  Patient Position Received: Up in chair, Alarm off, not on at start of session  General Comment: pt. pleasant and agreeable to therapy evaluation    Precautions:  Medical Precautions: Fall precautions  Precautions Comment: strict I&O    Pain:  Pain Assessment  Pain Assessment: 0-10  0-10 (Numeric) Pain Score: 0 - No pain    Objective     Cognition:  Overall Cognitive Status: Within Functional Limits (O X 3)     Home Living:  Type of Home: House  Lives With: Alone  Home Adaptive Equipment: Walker rolling or standard, Cane  Home Layout: One level, Laundry in basement  Home Access: Stairs to enter with rails (2)  Bathroom Shower/Tub: Walk-in shower (grab bar, hhs)  Bathroom Toilet: Handicapped height (grab bar)     Prior Function:  Level of Edgar: Independent with ADLs and functional transfers, Needs assistance with homemaking (assist only for laundry since it is in the basement)  Receives Help From: Family (does laundry)  ADL Assistance: Independent  Homemaking Assistance: Needs assistance  Driving/Transportation: Independent  Shopping: Independent  Ambulatory Assistance: Independent (no device)  Vocational: Retired  Hand Dominance: Right    ADL:  Eating Assistance: Independent  Grooming Assistance: Independent  Bathing Assistance: Stand by  UE Dressing Assistance: Independent  LE Dressing Assistance: Stand by  Toileting Assistance with Device: Stand by (standing to use urinal; sba to wash hands in sink)    Activity Tolerance:  Endurance: Decreased tolerance for upright activites    Bed Mobility/Transfers:   Bed Mobility  Bed Mobility:  No  Transfers  Transfer:  (pt. ind. sit to stand)    Ambulation/Gait Training:  Functional Mobility  Functional Mobility Performed:  (pt. ambulated without device with sba.)    Sitting Balance:  Static Sitting Balance  Static Sitting-Level of Assistance: Independent    Standing Balance:  Static Standing Balance  Static Standing-Level of Assistance: Close supervision    Sensation:  Light Touch: No apparent deficits    Strength:  Strength Comments: bilat. elbow/ strength 4/5    Coordination:  Movements are Fluid and Coordinated: Yes     Hand Function:  Hand Function  Gross Grasp: Functional  Coordination: Functional    Extremities:   RUE : Within Functional Limits  LUE: Within Functional Limits    Outcome Measures: [facility] Daily Activity  Putting on and taking off regular lower body clothing: A little  Bathing (including washing, rinsing, drying): A little  Putting on and taking off regular upper body clothing: None  Toileting, which includes using toilet, bedpan or urinal: A little  Taking care of personal grooming such as brushing teeth: None  Eating Meals: None  Daily Activity - Total Score: 21     EDUCATION:     Education Documentation  ADL Training, taught by Naty Moses OT at 6/23/2025  3:19 PM.  Learner: Patient  Readiness: Acceptance  Method: Explanation  Response: Verbalizes Understanding    Education Comments  No comments found.        Goals:   Encounter Problems       Encounter Problems (Active)       OT Goals       Pt. will perform lower body bathing and dressing ind.'ly using adaptive equipment as needed.  (Progressing)       Start:  06/23/25    Expected End:  07/07/25            Pt. will perform toileting ind.'ly using dme/adaptive equipment as needed.  (Progressing)       Start:  06/23/25    Expected End:  07/07/25            Pt. will perform bed mobility/chair/commode/shower chair transfers ind.'ly. (Progressing)       Start:  06/23/25    Expected End:  07/07/25            Pt. will perform  functional mobility ind.'ly in prep. for homemaking tasks.  (Progressing)       Start:  06/23/25    Expected End:  07/07/25            Pt. will demonstrate knowledge and application of energy conservation/work simplification/purse-lipped breathing techniques during adl tasks.  (Progressing)       Start:  06/23/25    Expected End:  07/07/25

## 2025-06-24 VITALS
SYSTOLIC BLOOD PRESSURE: 99 MMHG | TEMPERATURE: 97.7 F | OXYGEN SATURATION: 96 % | WEIGHT: 132.94 LBS | DIASTOLIC BLOOD PRESSURE: 57 MMHG | RESPIRATION RATE: 18 BRPM | BODY MASS INDEX: 23.55 KG/M2 | HEIGHT: 63 IN | HEART RATE: 68 BPM

## 2025-06-24 LAB
ANION GAP SERPL CALC-SCNC: 16 MMOL/L (ref 10–20)
BUN SERPL-MCNC: 42 MG/DL (ref 6–23)
CALCIUM SERPL-MCNC: 8.8 MG/DL (ref 8.6–10.3)
CHLORIDE SERPL-SCNC: 104 MMOL/L (ref 98–107)
CO2 SERPL-SCNC: 27 MMOL/L (ref 21–32)
CREAT SERPL-MCNC: 1.81 MG/DL (ref 0.5–1.3)
EGFRCR SERPLBLD CKD-EPI 2021: 34 ML/MIN/1.73M*2
ERYTHROCYTE [DISTWIDTH] IN BLOOD BY AUTOMATED COUNT: 14.5 % (ref 11.5–14.5)
GLUCOSE SERPL-MCNC: 95 MG/DL (ref 74–99)
HCT VFR BLD AUTO: 42.2 % (ref 41–52)
HGB BLD-MCNC: 13.3 G/DL (ref 13.5–17.5)
MAGNESIUM SERPL-MCNC: 2.52 MG/DL (ref 1.6–2.4)
MCH RBC QN AUTO: 32.5 PG (ref 26–34)
MCHC RBC AUTO-ENTMCNC: 31.5 G/DL (ref 32–36)
MCV RBC AUTO: 103 FL (ref 80–100)
NRBC BLD-RTO: 0 /100 WBCS (ref 0–0)
PLATELET # BLD AUTO: 121 X10*3/UL (ref 150–450)
POTASSIUM SERPL-SCNC: 3.9 MMOL/L (ref 3.5–5.3)
RBC # BLD AUTO: 4.09 X10*6/UL (ref 4.5–5.9)
SODIUM SERPL-SCNC: 143 MMOL/L (ref 136–145)
WBC # BLD AUTO: 5.7 X10*3/UL (ref 4.4–11.3)

## 2025-06-24 PROCEDURE — 80048 BASIC METABOLIC PNL TOTAL CA: CPT

## 2025-06-24 PROCEDURE — 36415 COLL VENOUS BLD VENIPUNCTURE: CPT

## 2025-06-24 PROCEDURE — 2500000001 HC RX 250 WO HCPCS SELF ADMINISTERED DRUGS (ALT 637 FOR MEDICARE OP): Performed by: STUDENT IN AN ORGANIZED HEALTH CARE EDUCATION/TRAINING PROGRAM

## 2025-06-24 PROCEDURE — 83735 ASSAY OF MAGNESIUM: CPT

## 2025-06-24 PROCEDURE — 2500000002 HC RX 250 W HCPCS SELF ADMINISTERED DRUGS (ALT 637 FOR MEDICARE OP, ALT 636 FOR OP/ED): Performed by: STUDENT IN AN ORGANIZED HEALTH CARE EDUCATION/TRAINING PROGRAM

## 2025-06-24 PROCEDURE — 85027 COMPLETE CBC AUTOMATED: CPT

## 2025-06-24 PROCEDURE — 2500000004 HC RX 250 GENERAL PHARMACY W/ HCPCS (ALT 636 FOR OP/ED): Performed by: STUDENT IN AN ORGANIZED HEALTH CARE EDUCATION/TRAINING PROGRAM

## 2025-06-24 PROCEDURE — G0378 HOSPITAL OBSERVATION PER HR: HCPCS

## 2025-06-24 PROCEDURE — 99239 HOSP IP/OBS DSCHRG MGMT >30: CPT

## 2025-06-24 RX ORDER — LISINOPRIL 5 MG/1
10 TABLET ORAL DAILY
Qty: 60 TABLET | Refills: 0 | Status: SHIPPED | OUTPATIENT
Start: 2025-06-24 | End: 2025-07-24

## 2025-06-24 RX ADMIN — DAPAGLIFLOZIN 10 MG: 10 TABLET, FILM COATED ORAL at 08:24

## 2025-06-24 RX ADMIN — METOPROLOL SUCCINATE 25 MG: 25 TABLET, EXTENDED RELEASE ORAL at 08:24

## 2025-06-24 RX ADMIN — APIXABAN 2.5 MG: 2.5 TABLET, FILM COATED ORAL at 08:24

## 2025-06-24 RX ADMIN — FUROSEMIDE 20 MG: 10 INJECTION, SOLUTION INTRAMUSCULAR; INTRAVENOUS at 08:26

## 2025-06-24 RX ADMIN — ROSUVASTATIN CALCIUM 5 MG: 10 TABLET, FILM COATED ORAL at 08:24

## 2025-06-24 NOTE — PROGRESS NOTES
25 1240   Discharge Planning   Living Arrangements Alone   Support Systems Children   Assistance Needed None   Type of Residence Private residence   Number of Stairs to Enter Residence 2   Do you have animals or pets at home? No   Who is requesting discharge planning? Provider   Home or Post Acute Services None   Type of Home Care Services Other (Comment)  (None)   Expected Discharge Disposition Home   Does the patient need discharge transport arranged? No     TCC Note: Met with pt at bedside and introduced self and explained role. Verified name, , demographics, insurance is [facility] and MD is Gerardo Marie. ER contact is sonLaurent phone: [phone]. Pt previously IPTA, lives alone in a home with 2 [place]. Pt had recent fall approximately 2 weeks ago in which he hit his head/face left wrist and entire left side. Does not use assistive devices, is not diabetic, no home O2. Pharmacy is [email or web address] or [facility]. Son will transport home at the time of discharge. Krissy Vyas, MSN,RN, TCC.

## 2025-06-24 NOTE — CARE PLAN
Pt remained safe and stable throughout shift. No reports of pain, dyspnea or discomfort. Heart rhythm remained in A. Flutter throughout evening. HR remained controlled in 60's. No acute events overnight.      Problem: Pain - Adult  Goal: Verbalizes/displays adequate comfort level or baseline comfort level  Outcome: Progressing     Problem: Safety - Adult  Goal: Free from fall injury  Outcome: Progressing     Problem: Discharge Planning  Goal: Discharge to home or other facility with appropriate resources  Outcome: Progressing     Problem: Chronic Conditions and Co-morbidities  Goal: Patient's chronic conditions and co-morbidity symptoms are monitored and maintained or improved  Outcome: Progressing     Problem: Nutrition  Goal: Nutrient intake appropriate for maintaining nutritional needs  Outcome: Progressing     Problem: Heart Failure  Goal: Improved gas exchange this shift  Outcome: Progressing  Goal: Improved urinary output this shift  Outcome: Progressing  Goal: Reduction in peripheral edema within 24 hours  Outcome: Progressing  Goal: Report improvement of dyspnea/breathlessness this shift  Outcome: Progressing  Goal: Weight from fluid excess reduced over 2-3 days, then stabilize  Outcome: Progressing  Goal: Increase self care and/or family involvement in 24 hours  Outcome: Progressing

## 2025-06-24 NOTE — DISCHARGE INSTRUCTIONS
1.  Follow-up with cardiology in the next 1 week regarding recent hospitalization.  Continue torsemide 20 mg once daily at discharge.  2.  Follow-up with PCP in the next 1 to 2 weeks regarding recent hospitalization.

## 2025-06-24 NOTE — DISCHARGE SUMMARY
Discharge Diagnosis  Acute on chronic systolic heart failure    Issues Requiring Follow-Up  Acute on chronic systolic heart failure    Discharge Meds     Medication List      CONTINUE taking these medications     apixaban 2.5 mg tablet; Commonly known as: Eliquis; Take 1 tablet (2.5   mg) by mouth 2 times a day.   Farxiga 10 mg tablet; Generic drug: dapagliflozin propanediol; TAKE 1   TABLET DAILY   lisinopril 5 mg tablet; Take 2 tablets (10 mg) by mouth once daily.   LORazepam 0.5 mg tablet; Commonly known as: Ativan; Take 1 tablet (0.5   mg) by mouth once daily as needed for anxiety.   metoprolol succinate XL 25 mg 24 hr tablet; Commonly known as:   Toprol-XL; Take 1 tablet (25 mg) by mouth once daily. Do not crush or   chew.   rosuvastatin 5 mg tablet; Commonly known as: Crestor; TAKE 1 TABLET   DAILY   torsemide 20 mg tablet; Commonly known as: Demadex; Take 1 tablet (20   mg) by mouth once daily.       Test Results Pending At Discharge  Pending Labs       No current pending labs.            Hospital Course  Sravan Christiansen is a 89 yo male with PMH of HFrEF with EF 20% (11/2024), pAflutter/Afib, on Eliquis, aortic stenosis s/p TAVR, CAD, HTN, HLD, AAA, CKD3b, prostate cancer, anxiety who presented with dyspnea on exertion.  Admission patient's chest x-ray consistent with cardiomegaly and pulmonary congestion.  BNP elevated at greater than 4000.  Patient states there was recent adjustments to his home diuretics with a change from 20 mg Lasix twice daily to torsemide 20 mg once daily.  Patient admitted for acute on chronic systolic heart failure.  Echo was ordered this admission which showed EF of 20 to 25% unchanged from prior.  Cardiology was consulted with recommendations to continue diuresis.  Patient was evaluated by PT and OT with no home-going needs.  Patient was diuresed adequately.  Recommend patient continue home dose of torsemide 20 mg once daily.  Patient to follow-up with his cardiologist in the next  week regarding recent hospitalization.  Patient was recommended to follow-up with his PCP in the next 1 week regarding recent hospitalization.    Pertinent Physical Exam At Time of Discharge  Physical Exam  Constitutional: frail, awake, alert, no acute distress  ENMT: mucous membranes moist, EOMI, conjunctivae clear  Head/Neck: normocephalic, atraumatic; supple, trachea midline  Respiratory/Thorax: patent airways, CTAB; no wheezes, rales, or rhonchi  Cardiovascular: afib/aflutter  Gastrointestinal: soft, nondistended, non-tender, bowel sounds appreciated  Extremities: palpable peripheral pulses, no edema or cyanosis  Neurological: AO x3, no focal deficits  Psychological: appropriate mood and behavior  Skin: warm and dry    Outpatient Follow-Up  Future Appointments   Date Time Provider Department Center   8/14/2025 11:40 AM Lino Valdez MD [id number] [place]   10/29/2025 11:00 AM PAR MAC 3 ECHO ROOM 1 [id number]   10/29/2025 11:45 AM Adan Cardozo MD [id number] [place]   6/18/2026 11:00 AM Steph Walker, APRN-CNP [id number] [place]       Fritz Newberry DO, PhD  Internal Medicine PGY2

## 2025-06-25 ENCOUNTER — PATIENT OUTREACH (OUTPATIENT)
Dept: PRIMARY CARE | Facility: CLINIC | Age: OVER 89
End: 2025-06-25
Payer: MEDICARE

## 2025-06-25 DIAGNOSIS — I10 ESSENTIAL HYPERTENSION: ICD-10-CM

## 2025-06-25 NOTE — PROGRESS NOTES
Discharge Facility: [facility]  Discharge Diagnosis: weakness, elevated troponin  Admission Date: 6/22/25  Discharge Date: 6/24/25    PCP Appointment Date: 7/3/25  Specialist Appointment Date: 7/24/25 Dr Cardozo  Hospital Encounter and Summary Linked: Yes  ED to Hosp-Admission (Discharged) with Sushant Meyer DO; Tamanna Do MD (06/22/2025)   See discharge assessment below for further details    Wrap Up  Wrap Up Additional Comments: Successful transition of care outreach with patient. Patient reports doing well at home since discharge. New meds/changes reviewed with patient during outreach. Patient denies CP/SOB/abdominal pain. Patient denies further discharge questions/concerns/needs at time of outreach call. Emphasized that follow up appts are needed after discharge with PCP and reviewed needed follow ups with any specialties to assess response to treatment from hospitalization. Patient aware of my availability for non-emergent concerns. (6/25/2025 12:25 PM)  Call End Time: 1155 (6/25/2025 12:25 PM)    Engagement  Call Start Time: 1147 (6/25/2025 12:25 PM)    Medications  Medications reviewed with patient/caregiver?: Yes (6/25/2025 12:25 PM)  Is the patient having any side effects they believe may be caused by any medication additions or changes?: No (6/25/2025 12:25 PM)  Does the patient have all medications ordered at discharge?: Yes (6/25/2025 12:25 PM)  Care Management Interventions: Provided patient education (6/25/2025 12:25 PM)  Prescription Comments: on lisinopril 10mg daily (6/25/2025 12:25 PM)  Is the patient taking all medications as directed (includes completed medication regime)?: Yes (6/25/2025 12:25 PM)  Care Management Interventions: Provided patient education (6/25/2025 12:25 PM)  Medication Comments: No issues with medications, says he plans to call Dr Cardozo's office to see about getting [facility] (6/25/2025 12:25 PM)    Appointments  Does the  patient have a primary care provider?: Yes (6/25/2025 12:25 PM)  Care Management Interventions: Verified appointment date/time/provider (6/25/2025 12:25 PM)  Has the patient kept scheduled appointments due by today?: Yes (6/25/2025 12:25 PM)  Care Management Interventions: Advised patient to keep appointment (6/25/2025 12:25 PM)    Patient Teaching  Does the patient have access to their discharge instructions?: Yes (6/25/2025 12:25 PM)  Care Management Interventions: Reviewed instructions with patient (6/25/2025 12:25 PM)  What is the patient's perception of their health status since discharge?: Improving (6/25/2025 12:25 PM)  Is the patient/caregiver able to teach back the hierarchy of who to call/visit for symptoms/problems? PCP, Specialist, Home Health nurse, Urgent Care, ED, 911: Yes (6/25/2025 12:25 PM)  Patient/Caregiver Education Comments: Aware to seek care with any worsening shortness of breath or any chest pain (6/25/2025 12:25 PM)

## 2025-06-26 ENCOUNTER — APPOINTMENT (OUTPATIENT)
Dept: CARDIOLOGY | Facility: CLINIC | Age: OVER 89
End: 2025-06-26
Payer: MEDICARE

## 2025-06-28 LAB
ATRIAL RATE: 268 BPM
ATRIAL RATE: 268 BPM
P AXIS: 81 DEGREES
P AXIS: 85 DEGREES
PR INTERVAL: 214 MS
PR INTERVAL: 229 MS
Q ONSET: 253 MS
Q ONSET: 255 MS
QRS COUNT: 10 BEATS
QRS COUNT: 11 BEATS
QRS DURATION: 117 MS
QRS DURATION: 129 MS
QT INTERVAL: 431 MS
QT INTERVAL: 455 MS
QTC CALCULATION(BAZETT): 455 MS
QTC CALCULATION(BAZETT): 477 MS
QTC FREDERICIA: 447 MS
QTC FREDERICIA: 470 MS
R AXIS: -18 DEGREES
R AXIS: -27 DEGREES
T AXIS: 101 DEGREES
T AXIS: 96 DEGREES
T OFFSET: 470 MS
T OFFSET: 480 MS
VENTRICULAR RATE: 66 BPM
VENTRICULAR RATE: 67 BPM

## 2025-07-03 ENCOUNTER — APPOINTMENT (OUTPATIENT)
Dept: PRIMARY CARE | Facility: CLINIC | Age: OVER 89
End: 2025-07-03
Payer: MEDICARE

## 2025-07-03 VITALS
WEIGHT: 120 LBS | TEMPERATURE: 98 F | OXYGEN SATURATION: 95 % | HEIGHT: 63 IN | BODY MASS INDEX: 21.26 KG/M2 | DIASTOLIC BLOOD PRESSURE: 66 MMHG | HEART RATE: 56 BPM | SYSTOLIC BLOOD PRESSURE: 105 MMHG

## 2025-07-03 DIAGNOSIS — N18.31 STAGE 3A CHRONIC KIDNEY DISEASE (MULTI): ICD-10-CM

## 2025-07-03 DIAGNOSIS — I25.118 CORONARY ARTERY DISEASE OF NATIVE ARTERY OF NATIVE HEART WITH STABLE ANGINA PECTORIS: ICD-10-CM

## 2025-07-03 DIAGNOSIS — I50.23 ACUTE ON CHRONIC SYSTOLIC (CONGESTIVE) HEART FAILURE: Primary | ICD-10-CM

## 2025-07-03 DIAGNOSIS — E78.2 HYPERLIPIDEMIA, MIXED: ICD-10-CM

## 2025-07-03 DIAGNOSIS — I10 ESSENTIAL HYPERTENSION: ICD-10-CM

## 2025-07-03 DIAGNOSIS — C61 CARCINOMA OF PROSTATE: ICD-10-CM

## 2025-07-03 DIAGNOSIS — Z95.2 S/P TAVR (TRANSCATHETER AORTIC VALVE REPLACEMENT): ICD-10-CM

## 2025-07-03 PROCEDURE — 1160F RVW MEDS BY RX/DR IN RCRD: CPT | Performed by: FAMILY MEDICINE

## 2025-07-03 PROCEDURE — 3074F SYST BP LT 130 MM HG: CPT | Performed by: FAMILY MEDICINE

## 2025-07-03 PROCEDURE — 1159F MED LIST DOCD IN RCRD: CPT | Performed by: FAMILY MEDICINE

## 2025-07-03 PROCEDURE — 3078F DIAST BP <80 MM HG: CPT | Performed by: FAMILY MEDICINE

## 2025-07-03 PROCEDURE — 1036F TOBACCO NON-USER: CPT | Performed by: FAMILY MEDICINE

## 2025-07-03 PROCEDURE — 1126F AMNT PAIN NOTED NONE PRSNT: CPT | Performed by: FAMILY MEDICINE

## 2025-07-03 PROCEDURE — 99495 TRANSJ CARE MGMT MOD F2F 14D: CPT | Performed by: FAMILY MEDICINE

## 2025-07-03 PROCEDURE — 1111F DSCHRG MED/CURRENT MED MERGE: CPT | Performed by: FAMILY MEDICINE

## 2025-07-03 ASSESSMENT — ENCOUNTER SYMPTOMS
OCCASIONAL FEELINGS OF UNSTEADINESS: 0
FATIGUE: 1
LOSS OF SENSATION IN FEET: 0
NEUROLOGICAL NEGATIVE: 1
SHORTNESS OF BREATH: 1
ENDOCRINE NEGATIVE: 1
DEPRESSION: 0
CARDIOVASCULAR NEGATIVE: 1

## 2025-07-03 ASSESSMENT — PATIENT HEALTH QUESTIONNAIRE - PHQ9
1. LITTLE INTEREST OR PLEASURE IN DOING THINGS: NOT AT ALL
2. FEELING DOWN, DEPRESSED OR HOPELESS: NOT AT ALL
SUM OF ALL RESPONSES TO PHQ9 QUESTIONS 1 AND 2: 0

## 2025-07-03 ASSESSMENT — PAIN SCALES - GENERAL: PAINLEVEL_OUTOF10: 0-NO PAIN

## 2025-07-03 NOTE — PROGRESS NOTES
"Subjective   Patient ID: Sravan Christiansen is a 89 y.o. male who presents for Hospital Follow-up ([facility] 1 1/2month ago fell ).  Patient did not fall he was seen in the emergency room for increased shortness of breath and weakness.  Diagnosed with chronic and acute congestive heart failure    HPI     Review of Systems   Constitutional:  Positive for fatigue.   Respiratory:  Positive for shortness of breath.    Cardiovascular: Negative.         DR Cardozo   Endocrine: Negative.    Neurological: Negative.        Objective   /66 (BP Location: Left arm)   Pulse 56   Temp 36.7 °C (98 °F) (Oral)   Ht 1.6 m (5' 3\")   Wt 54.4 kg (120 lb)   SpO2 95%   BMI 21.26 kg/m²     Physical Exam  Vitals and nursing note reviewed.   Constitutional:       Appearance: Normal appearance.   Cardiovascular:      Rate and Rhythm: Normal rate and regular rhythm.      Pulses: Normal pulses.      Heart sounds: Normal heart sounds.   Pulmonary:      Breath sounds: Normal breath sounds.   Neurological:      General: No focal deficit present.      Mental Status: He is alert and oriented to person, place, and time.   Psychiatric:         Mood and Affect: Mood normal.         Behavior: Behavior normal.         Assessment/Plan patient seen here for follow-up after having been admitted at [facility] from June 22 to June 24 he was admitted with acute congestive heart failure along with troponin elevation.  We reviewed his medications pre and post admission.  We also were able to review his test results.  He has upcoming appoint with cardiology.  At this time he is feeling better.  Will see him back as needed  Problem List Items Addressed This Visit           ICD-10-CM    Coronary artery disease with stable angina pectoris I25.118    Essential hypertension I10    Hyperlipidemia, mixed E78.2    Acute on chronic systolic (congestive) heart failure - Primary I50.23    Carcinoma of prostate C61    S/P TAVR (transcatheter aortic valve replacement) " Z95.2    Stage 3a chronic kidney disease (Multi) N18.31

## 2025-07-08 ENCOUNTER — PATIENT OUTREACH (OUTPATIENT)
Dept: PRIMARY CARE | Facility: CLINIC | Age: OVER 89
End: 2025-07-08
Payer: MEDICARE

## 2025-07-08 NOTE — PROGRESS NOTES
Unable to reach patient for follow up call after recent hospitalization.   Left voicemail with call back number for patient to call if needed

## 2025-07-24 ENCOUNTER — OFFICE VISIT (OUTPATIENT)
Dept: CARDIOLOGY | Facility: CLINIC | Age: OVER 89
End: 2025-07-24
Payer: MEDICARE

## 2025-07-24 VITALS
SYSTOLIC BLOOD PRESSURE: 88 MMHG | HEIGHT: 63 IN | OXYGEN SATURATION: 95 % | BODY MASS INDEX: 20.91 KG/M2 | HEART RATE: 67 BPM | DIASTOLIC BLOOD PRESSURE: 68 MMHG | WEIGHT: 118 LBS

## 2025-07-24 DIAGNOSIS — I50.23 ACUTE ON CHRONIC SYSTOLIC (CONGESTIVE) HEART FAILURE: ICD-10-CM

## 2025-07-24 DIAGNOSIS — E78.2 HYPERLIPIDEMIA, MIXED: ICD-10-CM

## 2025-07-24 DIAGNOSIS — N18.31 STAGE 3A CHRONIC KIDNEY DISEASE (MULTI): ICD-10-CM

## 2025-07-24 DIAGNOSIS — I25.10 ATHEROSCLEROSIS OF NATIVE CORONARY ARTERY OF NATIVE HEART WITHOUT ANGINA PECTORIS: ICD-10-CM

## 2025-07-24 DIAGNOSIS — Z95.5 PRESENCE OF STENT IN CORONARY ARTERY: ICD-10-CM

## 2025-07-24 DIAGNOSIS — I48.3 TYPICAL ATRIAL FLUTTER (MULTI): Primary | ICD-10-CM

## 2025-07-24 DIAGNOSIS — Z95.2 S/P TAVR (TRANSCATHETER AORTIC VALVE REPLACEMENT): ICD-10-CM

## 2025-07-24 DIAGNOSIS — R00.2 PALPITATIONS: ICD-10-CM

## 2025-07-24 DIAGNOSIS — R53.82 CHRONIC FATIGUE: ICD-10-CM

## 2025-07-24 DIAGNOSIS — I10 ESSENTIAL HYPERTENSION: ICD-10-CM

## 2025-07-24 PROBLEM — I25.118 CORONARY ARTERY DISEASE WITH STABLE ANGINA PECTORIS: Status: RESOLVED | Noted: 2023-03-24 | Resolved: 2025-07-24

## 2025-07-24 PROBLEM — R79.89 ELEVATED TROPONIN: Status: RESOLVED | Noted: 2025-06-22 | Resolved: 2025-07-24

## 2025-07-24 PROCEDURE — 3074F SYST BP LT 130 MM HG: CPT | Performed by: INTERNAL MEDICINE

## 2025-07-24 PROCEDURE — 1036F TOBACCO NON-USER: CPT | Performed by: INTERNAL MEDICINE

## 2025-07-24 PROCEDURE — 1111F DSCHRG MED/CURRENT MED MERGE: CPT | Performed by: INTERNAL MEDICINE

## 2025-07-24 PROCEDURE — 1159F MED LIST DOCD IN RCRD: CPT | Performed by: INTERNAL MEDICINE

## 2025-07-24 PROCEDURE — 99214 OFFICE O/P EST MOD 30 MIN: CPT | Performed by: INTERNAL MEDICINE

## 2025-07-24 PROCEDURE — 99213 OFFICE O/P EST LOW 20 MIN: CPT

## 2025-07-24 PROCEDURE — 3078F DIAST BP <80 MM HG: CPT | Performed by: INTERNAL MEDICINE

## 2025-07-24 NOTE — PATIENT INSTRUCTIONS
Take lisinopril 5 mg one daily    Continue your other medications.    Get a blood test a few days before you see Dr. Valdez.  Week of August 4-8.

## 2025-07-24 NOTE — PROGRESS NOTES
Subjective   Sravan Christiansen is a 89 y.o. male.    Chief Complaint:  Hospital follow-up for congestive heart failure.    HPI    He was hospitalized in 2025 with shortness of breath and dyspnea.  Found to be in worsening congestive heart failure.  BNP was elevated at 4584.  Had mildly positive troponins.  He was treated with some IV diuretics.  Discharged on the same medications.  Since discharge she has felt about the same.  Gets episodes of dizziness and lightheadedness.  Shortness of breath is about the same.  Getting very discouraged about his situation.  He is very distressed that he could no longer do certain activities such as working out in the yard.    The patient is status post transaortic valve replacement.  This was done on 2023.  A 34 mm valve was placed.    An echocardiographic study performed on 2025 showed an ejection fraction of 20 to 25%.  Reduced right ventricular function.  Normally functioning bioprosthetic aortic valve with a small.  Valvular leak and moderate mitral and mild to moderate tricuspid regurgitation.     Cardiac catheterization performed on 2023 demonstrated mild coronary artery disease. Right heart catheterization demonstrated elevated pulmonary artery pressures and mildly elevated wedge pressure of 21.      The medical problems including history of bradycardia, hypertension, hyperlipidemia, and a past smoking history.     His wife  a few years ago.      Allergies  Medication    · Penicillins     Family History  Mother    · Family history of cerebral aneurysm (V17.1) (Z82.49)  Brother    · Family history of cardiac disorder (V17.49) (Z82.49)     Social History  Problems    · Consumes alcohol occasionally (V49.89) (Z78.9)   · Former smoker (V15.82) (Z87.891)   · QUIT IN MID 20'S     Review of Systems   Constitutional: Positive for malaise/fatigue.   Cardiovascular:  Positive for dyspnea on exertion.   Musculoskeletal:  Positive for arthritis.  "  All other systems reviewed and are negative.  Dictation #1  MRN:[id number]  CSN:[id number]     Current Medications[1]     Visit Vitals  BP 88/68   Pulse 67   Ht 1.6 m (5' 3\")   Wt 53.5 kg (118 lb)   SpO2 95%   BMI 20.90 kg/m²   Smoking Status Never   BSA 1.54 m²        Objective     Constitutional:       Appearance: Not in distress.   Neck:      Vascular: JVD normal.   Pulmonary:      Breath sounds: Normal breath sounds.   Cardiovascular:      Normal rate. Regular rhythm. S1 with normal intensity. S2 with normal intensity.       Murmurs: There is a grade 1/6 systolic murmur.      S3 Gallop.    Pulses:     Decreased pulses.   Edema:     Peripheral edema absent.   Abdominal:      General: Bowel sounds are normal.   Neurological:      Mental Status: Alert and oriented to person, place and time.       Lab Review:   Lab Results   Component Value Date     06/24/2025     04/15/2025    K 3.9 06/24/2025    K 4.5 04/15/2025     06/24/2025     04/15/2025    CO2 27 06/24/2025    CO2 29 04/15/2025    BUN 42 (H) 06/24/2025    BUN 52 (H) 04/15/2025    CREATININE 1.81 (H) 06/24/2025    CREATININE 1.76 (H) 04/15/2025    GLUCOSE 95 06/24/2025    GLUCOSE 87 04/15/2025    CALCIUM 8.8 06/24/2025    CALCIUM 9.5 04/15/2025     Lab Results   Component Value Date    WBC 5.7 06/24/2025    WBC 6.0 03/20/2025    HGB 13.3 (L) 06/24/2025    HGB 13.9 03/20/2025    HCT 42.2 06/24/2025    HCT 43.1 03/20/2025     (H) 06/24/2025    MCV 95.8 03/20/2025     (L) 06/24/2025     (L) 03/20/2025       Assessment:    1.  Chronic systolic congestive heart failure.  On today's visit clinically he is not in heart failure.  Lungs are clear oxygen saturation is 97% and there is no peripheral edema.  Continue torsemide 20 mg daily.    2.  Hypotension.  Somewhat symptomatic from the hypotension.  Continue lisinopril 5 mg daily.  I am reluctant to use Entresto because of potential for more severe hypotension.    3.  " Status post TAVR.  Small perivalvular leak by my review of the latest echocardiographic study.    4.  Chronic renal insufficiency.  Most recent creatinine is 1.7.    5.  Chronic atrial flutter.  Controlled ventricular response.    6.  Cardiac cachexia.  The major concern is he continues to lose weight.         [1]   Current Outpatient Medications   Medication Sig Dispense Refill    apixaban (Eliquis) 2.5 mg tablet Take 1 tablet (2.5 mg) by mouth 2 times a day. 180 tablet 3    Farxiga 10 mg tablet TAKE 1 TABLET DAILY 90 tablet 3    lisinopril 5 mg tablet Take 2 tablets (10 mg) by mouth once daily. 60 tablet 0    LORazepam (Ativan) 0.5 mg tablet Take 1 tablet (0.5 mg) by mouth once daily as needed for anxiety. 90 tablet 0    metoprolol succinate XL (Toprol-XL) 25 mg 24 hr tablet Take 1 tablet (25 mg) by mouth once daily. Do not crush or chew. 90 tablet 3    rosuvastatin (Crestor) 5 mg tablet TAKE 1 TABLET DAILY 90 tablet 3    torsemide (Demadex) 20 mg tablet Take 1 tablet (20 mg) by mouth once daily. 90 tablet 3     No current facility-administered medications for this visit.

## 2025-08-12 ENCOUNTER — PATIENT OUTREACH (OUTPATIENT)
Dept: PRIMARY CARE | Facility: CLINIC | Age: OVER 89
End: 2025-08-12
Payer: MEDICARE

## 2025-08-14 ENCOUNTER — OFFICE VISIT (OUTPATIENT)
Dept: CARDIOLOGY | Facility: CLINIC | Age: OVER 89
End: 2025-08-14
Payer: MEDICARE

## 2025-08-14 VITALS
BODY MASS INDEX: 20.9 KG/M2 | SYSTOLIC BLOOD PRESSURE: 110 MMHG | OXYGEN SATURATION: 95 % | DIASTOLIC BLOOD PRESSURE: 72 MMHG | HEART RATE: 57 BPM | WEIGHT: 118 LBS

## 2025-08-14 DIAGNOSIS — I50.23 ACUTE ON CHRONIC SYSTOLIC (CONGESTIVE) HEART FAILURE: ICD-10-CM

## 2025-08-14 DIAGNOSIS — R00.2 PALPITATIONS: Primary | ICD-10-CM

## 2025-08-14 DIAGNOSIS — I48.3 TYPICAL ATRIAL FLUTTER (MULTI): ICD-10-CM

## 2025-08-14 PROCEDURE — 1159F MED LIST DOCD IN RCRD: CPT | Performed by: SPECIALIST

## 2025-08-14 PROCEDURE — 99212 OFFICE O/P EST SF 10 MIN: CPT

## 2025-08-14 PROCEDURE — 99214 OFFICE O/P EST MOD 30 MIN: CPT | Performed by: SPECIALIST

## 2025-08-14 PROCEDURE — G2211 COMPLEX E/M VISIT ADD ON: HCPCS | Performed by: SPECIALIST

## 2025-08-22 LAB
ANION GAP SERPL CALCULATED.4IONS-SCNC: 10 MMOL/L (CALC) (ref 7–17)
BNP SERPL-MCNC: 1368 PG/ML
BUN SERPL-MCNC: 49 MG/DL (ref 7–25)
BUN/CREAT SERPL: 29 (CALC) (ref 6–22)
CALCIUM SERPL-MCNC: 9 MG/DL (ref 8.6–10.3)
CHLORIDE SERPL-SCNC: 101 MMOL/L (ref 98–110)
CO2 SERPL-SCNC: 29 MMOL/L (ref 20–32)
CREAT SERPL-MCNC: 1.67 MG/DL (ref 0.7–1.22)
EGFRCR SERPLBLD CKD-EPI 2021: 37 ML/MIN/1.73M2
GLUCOSE SERPL-MCNC: 66 MG/DL (ref 65–99)
POTASSIUM SERPL-SCNC: 4.5 MMOL/L (ref 3.5–5.3)
SODIUM SERPL-SCNC: 140 MMOL/L (ref 135–146)

## 2025-09-02 ENCOUNTER — TELEPHONE (OUTPATIENT)
Dept: PRIMARY CARE | Facility: CLINIC | Age: OVER 89
End: 2025-09-02
Payer: MEDICARE

## 2025-09-02 DIAGNOSIS — F41.8 SITUATIONAL ANXIETY: ICD-10-CM

## 2025-09-02 RX ORDER — LORAZEPAM 0.5 MG/1
0.5 TABLET ORAL DAILY PRN
Qty: 90 TABLET | Refills: 0 | Status: SHIPPED | OUTPATIENT
Start: 2025-09-02

## 2025-11-04 ENCOUNTER — APPOINTMENT (OUTPATIENT)
Dept: PRIMARY CARE | Facility: CLINIC | Age: OVER 89
End: 2025-11-04
Payer: MEDICARE